# Patient Record
Sex: FEMALE | Race: ASIAN | NOT HISPANIC OR LATINO
[De-identification: names, ages, dates, MRNs, and addresses within clinical notes are randomized per-mention and may not be internally consistent; named-entity substitution may affect disease eponyms.]

---

## 2017-03-01 ENCOUNTER — OTHER (OUTPATIENT)
Age: 42
End: 2017-03-01

## 2017-03-02 ENCOUNTER — APPOINTMENT (OUTPATIENT)
Dept: ULTRASOUND IMAGING | Facility: CLINIC | Age: 42
End: 2017-03-02

## 2017-03-02 ENCOUNTER — OUTPATIENT (OUTPATIENT)
Dept: OUTPATIENT SERVICES | Facility: HOSPITAL | Age: 42
LOS: 1 days | End: 2017-03-02
Payer: SELF-PAY

## 2017-03-02 DIAGNOSIS — R10.2 PELVIC AND PERINEAL PAIN: ICD-10-CM

## 2017-03-02 PROCEDURE — 76830 TRANSVAGINAL US NON-OB: CPT

## 2017-03-02 PROCEDURE — 76856 US EXAM PELVIC COMPLETE: CPT

## 2017-03-09 ENCOUNTER — APPOINTMENT (OUTPATIENT)
Dept: ULTRASOUND IMAGING | Facility: CLINIC | Age: 42
End: 2017-03-09

## 2017-03-29 ENCOUNTER — LABORATORY RESULT (OUTPATIENT)
Age: 42
End: 2017-03-29

## 2017-03-29 ENCOUNTER — APPOINTMENT (OUTPATIENT)
Dept: OBGYN | Facility: CLINIC | Age: 42
End: 2017-03-29

## 2017-03-29 ENCOUNTER — OUTPATIENT (OUTPATIENT)
Dept: OUTPATIENT SERVICES | Facility: HOSPITAL | Age: 42
LOS: 1 days | End: 2017-03-29
Payer: SELF-PAY

## 2017-03-29 VITALS — WEIGHT: 106 LBS | DIASTOLIC BLOOD PRESSURE: 74 MMHG | SYSTOLIC BLOOD PRESSURE: 118 MMHG | BODY MASS INDEX: 20.03 KG/M2

## 2017-03-29 DIAGNOSIS — T83.32XA DISPLACEMENT OF INTRAUTERINE CONTRACEPTIVE DEVICE, INITIAL ENCOUNTER: ICD-10-CM

## 2017-03-29 DIAGNOSIS — N76.0 ACUTE VAGINITIS: ICD-10-CM

## 2017-03-29 DIAGNOSIS — R10.2 PELVIC AND PERINEAL PAIN: ICD-10-CM

## 2017-03-29 DIAGNOSIS — Z86.19 PERSONAL HISTORY OF OTHER INFECTIOUS AND PARASITIC DISEASES: ICD-10-CM

## 2017-03-29 PROCEDURE — 87389 HIV-1 AG W/HIV-1&-2 AB AG IA: CPT

## 2017-03-29 PROCEDURE — G0463: CPT

## 2017-03-29 PROCEDURE — 86592 SYPHILIS TEST NON-TREP QUAL: CPT

## 2017-03-30 LAB
HIV 1+2 AB+HIV1 P24 AG SERPL QL IA: SIGNIFICANT CHANGE UP
RPR SERPL-ACNC: SIGNIFICANT CHANGE UP

## 2017-03-31 LAB
C TRACH RRNA SPEC QL NAA+PROBE: SIGNIFICANT CHANGE UP
N GONORRHOEA RRNA SPEC QL NAA+PROBE: SIGNIFICANT CHANGE UP
SPECIMEN SOURCE: SIGNIFICANT CHANGE UP

## 2017-04-04 DIAGNOSIS — Z01.419 ENCOUNTER FOR GYNECOLOGICAL EXAMINATION (GENERAL) (ROUTINE) WITHOUT ABNORMAL FINDINGS: ICD-10-CM

## 2017-04-21 ENCOUNTER — MESSAGE (OUTPATIENT)
Age: 42
End: 2017-04-21

## 2018-03-28 ENCOUNTER — LABORATORY RESULT (OUTPATIENT)
Age: 43
End: 2018-03-28

## 2018-03-28 ENCOUNTER — APPOINTMENT (OUTPATIENT)
Dept: INTERNAL MEDICINE | Facility: CLINIC | Age: 43
End: 2018-03-28
Payer: MEDICAID

## 2018-03-28 ENCOUNTER — OUTPATIENT (OUTPATIENT)
Dept: OUTPATIENT SERVICES | Facility: HOSPITAL | Age: 43
LOS: 1 days | End: 2018-03-28
Payer: MEDICAID

## 2018-03-28 VITALS
SYSTOLIC BLOOD PRESSURE: 110 MMHG | BODY MASS INDEX: 20.2 KG/M2 | DIASTOLIC BLOOD PRESSURE: 70 MMHG | HEIGHT: 61 IN | WEIGHT: 107 LBS

## 2018-03-28 DIAGNOSIS — Z78.9 OTHER SPECIFIED HEALTH STATUS: ICD-10-CM

## 2018-03-28 DIAGNOSIS — I10 ESSENTIAL (PRIMARY) HYPERTENSION: ICD-10-CM

## 2018-03-28 DIAGNOSIS — R53.83 OTHER FATIGUE: ICD-10-CM

## 2018-03-28 DIAGNOSIS — Z83.3 FAMILY HISTORY OF DIABETES MELLITUS: ICD-10-CM

## 2018-03-28 DIAGNOSIS — Z82.49 FAMILY HISTORY OF ISCHEMIC HEART DISEASE AND OTHER DISEASES OF THE CIRCULATORY SYSTEM: ICD-10-CM

## 2018-03-28 DIAGNOSIS — Z87.42 PERSONAL HISTORY OF OTHER DISEASES OF THE FEMALE GENITAL TRACT: ICD-10-CM

## 2018-03-28 PROCEDURE — 80061 LIPID PANEL: CPT

## 2018-03-28 PROCEDURE — 80053 COMPREHEN METABOLIC PANEL: CPT

## 2018-03-28 PROCEDURE — 85027 COMPLETE CBC AUTOMATED: CPT

## 2018-03-28 PROCEDURE — G0463: CPT

## 2018-03-28 PROCEDURE — 99203 OFFICE O/P NEW LOW 30 MIN: CPT | Mod: GE

## 2018-03-28 PROCEDURE — 83036 HEMOGLOBIN GLYCOSYLATED A1C: CPT

## 2018-03-28 PROCEDURE — 84443 ASSAY THYROID STIM HORMONE: CPT

## 2018-03-29 LAB
HBA1C BLD-MCNC: 5.4 % — SIGNIFICANT CHANGE UP (ref 4–5.6)
HCT VFR BLD CALC: 42 % — SIGNIFICANT CHANGE UP (ref 34.5–45)
HGB BLD-MCNC: 13.5 G/DL — SIGNIFICANT CHANGE UP (ref 11.5–15.5)
MCHC RBC-ENTMCNC: 28.1 PG — SIGNIFICANT CHANGE UP (ref 27–34)
MCHC RBC-ENTMCNC: 32.1 GM/DL — SIGNIFICANT CHANGE UP (ref 32–36)
MCV RBC AUTO: 87.3 FL — SIGNIFICANT CHANGE UP (ref 80–100)
NRBC # BLD: 0 /100 WBCS — SIGNIFICANT CHANGE UP (ref 0–0)
PLATELET # BLD AUTO: 206 K/UL — SIGNIFICANT CHANGE UP (ref 150–400)
RBC # BLD: 4.81 M/UL — SIGNIFICANT CHANGE UP (ref 3.8–5.2)
RBC # FLD: 14.1 % — SIGNIFICANT CHANGE UP (ref 10.3–14.5)
WBC # BLD: 5.51 K/UL — SIGNIFICANT CHANGE UP (ref 3.8–10.5)
WBC # FLD AUTO: 5.51 K/UL — SIGNIFICANT CHANGE UP (ref 3.8–10.5)

## 2018-03-30 ENCOUNTER — FORM ENCOUNTER (OUTPATIENT)
Age: 43
End: 2018-03-30

## 2018-03-30 LAB
ALBUMIN SERPL ELPH-MCNC: 4.3 G/DL — SIGNIFICANT CHANGE UP (ref 3.3–5)
ALP SERPL-CCNC: 68 U/L — SIGNIFICANT CHANGE UP (ref 40–120)
ALT FLD-CCNC: 11 U/L — SIGNIFICANT CHANGE UP (ref 10–45)
ANION GAP SERPL CALC-SCNC: 12 MMOL/L — SIGNIFICANT CHANGE UP (ref 5–17)
AST SERPL-CCNC: 17 U/L — SIGNIFICANT CHANGE UP (ref 10–40)
BILIRUB SERPL-MCNC: 0.3 MG/DL — SIGNIFICANT CHANGE UP (ref 0.2–1.2)
BUN SERPL-MCNC: 11 MG/DL — SIGNIFICANT CHANGE UP (ref 7–23)
CALCIUM SERPL-MCNC: 10 MG/DL — SIGNIFICANT CHANGE UP (ref 8.4–10.5)
CHLORIDE SERPL-SCNC: 101 MMOL/L — SIGNIFICANT CHANGE UP (ref 96–108)
CHOLEST SERPL-MCNC: 188 MG/DL — SIGNIFICANT CHANGE UP (ref 10–199)
CO2 SERPL-SCNC: 28 MMOL/L — SIGNIFICANT CHANGE UP (ref 22–31)
CREAT SERPL-MCNC: 0.83 MG/DL — SIGNIFICANT CHANGE UP (ref 0.5–1.3)
GLUCOSE SERPL-MCNC: 83 MG/DL — SIGNIFICANT CHANGE UP (ref 70–99)
HDLC SERPL-MCNC: 59 MG/DL — SIGNIFICANT CHANGE UP (ref 40–125)
LIPID PNL WITH DIRECT LDL SERPL: 111 MG/DL — SIGNIFICANT CHANGE UP
POTASSIUM SERPL-MCNC: 4.7 MMOL/L — SIGNIFICANT CHANGE UP (ref 3.5–5.3)
POTASSIUM SERPL-SCNC: 4.7 MMOL/L — SIGNIFICANT CHANGE UP (ref 3.5–5.3)
PROT SERPL-MCNC: 7.9 G/DL — SIGNIFICANT CHANGE UP (ref 6–8.3)
SODIUM SERPL-SCNC: 141 MMOL/L — SIGNIFICANT CHANGE UP (ref 135–145)
T4 FREE+ TSH PNL SERPL: 1.01 UIU/ML — SIGNIFICANT CHANGE UP (ref 0.27–4.2)
TOTAL CHOLESTEROL/HDL RATIO MEASUREMENT: 3.2 RATIO — LOW (ref 3.3–7.1)
TRIGL SERPL-MCNC: 90 MG/DL — SIGNIFICANT CHANGE UP (ref 10–149)

## 2018-03-31 ENCOUNTER — OUTPATIENT (OUTPATIENT)
Dept: OUTPATIENT SERVICES | Facility: HOSPITAL | Age: 43
LOS: 1 days | End: 2018-03-31
Payer: MEDICAID

## 2018-03-31 ENCOUNTER — APPOINTMENT (OUTPATIENT)
Dept: MAMMOGRAPHY | Facility: IMAGING CENTER | Age: 43
End: 2018-03-31
Payer: MEDICAID

## 2018-03-31 DIAGNOSIS — Z00.00 ENCOUNTER FOR GENERAL ADULT MEDICAL EXAMINATION WITHOUT ABNORMAL FINDINGS: ICD-10-CM

## 2018-03-31 PROCEDURE — 77067 SCR MAMMO BI INCL CAD: CPT | Mod: 26

## 2018-03-31 PROCEDURE — 77063 BREAST TOMOSYNTHESIS BI: CPT | Mod: 26

## 2018-03-31 PROCEDURE — 77063 BREAST TOMOSYNTHESIS BI: CPT

## 2018-03-31 PROCEDURE — 77067 SCR MAMMO BI INCL CAD: CPT

## 2018-04-04 DIAGNOSIS — R53.83 OTHER FATIGUE: ICD-10-CM

## 2018-04-11 ENCOUNTER — APPOINTMENT (OUTPATIENT)
Dept: OBGYN | Facility: CLINIC | Age: 43
End: 2018-04-11

## 2018-04-23 ENCOUNTER — OUTPATIENT (OUTPATIENT)
Dept: OUTPATIENT SERVICES | Facility: HOSPITAL | Age: 43
LOS: 1 days | End: 2018-04-23
Payer: MEDICAID

## 2018-04-23 ENCOUNTER — APPOINTMENT (OUTPATIENT)
Dept: INTERNAL MEDICINE | Facility: CLINIC | Age: 43
End: 2018-04-23
Payer: MEDICAID

## 2018-04-23 VITALS — HEART RATE: 77 BPM | DIASTOLIC BLOOD PRESSURE: 60 MMHG | OXYGEN SATURATION: 99 % | SYSTOLIC BLOOD PRESSURE: 101 MMHG

## 2018-04-23 DIAGNOSIS — I10 ESSENTIAL (PRIMARY) HYPERTENSION: ICD-10-CM

## 2018-04-23 PROCEDURE — 99214 OFFICE O/P EST MOD 30 MIN: CPT | Mod: GC

## 2018-04-23 PROCEDURE — G0463: CPT

## 2018-04-25 DIAGNOSIS — T88.7XXA UNSPECIFIED ADVERSE EFFECT OF DRUG OR MEDICAMENT, INITIAL ENCOUNTER: ICD-10-CM

## 2018-05-24 ENCOUNTER — APPOINTMENT (OUTPATIENT)
Dept: OBGYN | Facility: CLINIC | Age: 43
End: 2018-05-24

## 2018-12-07 ENCOUNTER — CHART COPY (OUTPATIENT)
Age: 43
End: 2018-12-07

## 2019-02-25 ENCOUNTER — APPOINTMENT (OUTPATIENT)
Dept: INTERNAL MEDICINE | Facility: CLINIC | Age: 44
End: 2019-02-25
Payer: MEDICAID

## 2019-02-25 ENCOUNTER — OUTPATIENT (OUTPATIENT)
Dept: OUTPATIENT SERVICES | Facility: HOSPITAL | Age: 44
LOS: 1 days | End: 2019-02-25
Payer: MEDICAID

## 2019-02-25 VITALS
OXYGEN SATURATION: 97 % | SYSTOLIC BLOOD PRESSURE: 110 MMHG | HEART RATE: 73 BPM | WEIGHT: 117 LBS | BODY MASS INDEX: 22.09 KG/M2 | DIASTOLIC BLOOD PRESSURE: 70 MMHG | TEMPERATURE: 98.4 F | HEIGHT: 61 IN

## 2019-02-25 DIAGNOSIS — I10 ESSENTIAL (PRIMARY) HYPERTENSION: ICD-10-CM

## 2019-02-25 PROCEDURE — 99213 OFFICE O/P EST LOW 20 MIN: CPT | Mod: GE

## 2019-02-25 PROCEDURE — G0463: CPT

## 2019-02-25 NOTE — PHYSICAL EXAM
[de-identified] : midl erythema of posterior pharynx, no exudates. edematous, erythematous nasal turbinates b/l with clear discharge.

## 2019-02-25 NOTE — REVIEW OF SYSTEMS
[Fever] : no fever [Earache] : no earache [Sore Throat] : no sore throat [Chest Pain] : no chest pain [Palpitations] : no palpitations [Shortness Of Breath] : no shortness of breath [Wheezing] : no wheezing [Abdominal Pain] : no abdominal pain [Vomiting] : no vomiting [Joint Pain] : no joint pain [Muscle Pain] : no muscle pain [Itching] : no itching [Fainting] : no fainting

## 2019-02-25 NOTE — ASSESSMENT
[FreeTextEntry1] : 43F with no significant PMH here for evaluation of cold-like symptoms x 6 days, likely 2/2 viral URI.\par \par # Viral URI:\par - advised conservative, symptomatic treatment for now\par - tylenol PRN headache, cepacol drops PRN sore throat, tessalon pearles PRN cough, sudafed PRN congestion\par - advised ample hydration\par - advised patient to call office if symptoms worsen, she develops fever, for re-evaluation\par - set expectation that symptoms may linger/persist for 1-2 weeks\par \par RTC 2 months for CPE

## 2019-02-25 NOTE — HISTORY OF PRESENT ILLNESS
[FreeTextEntry8] : 43F with no significant PMH here for an acute visit for cold-like symptoms. Patient states since Wednesday (6 days ago) she has been developing worsening cough, congestion, headache and nasal congestion. She also has been fatigued and having mild chills. Denies any recent travel or sick contacts. Did not get the flu shot this year. Symptoms moderately improved with robitussin and cough drops. Denies any nausea, vomiting, diarrhea, no recorded fever. No dyspnea or chest pain.

## 2019-02-27 DIAGNOSIS — J06.9 ACUTE UPPER RESPIRATORY INFECTION, UNSPECIFIED: ICD-10-CM

## 2019-03-19 ENCOUNTER — OUTPATIENT (OUTPATIENT)
Dept: OUTPATIENT SERVICES | Facility: HOSPITAL | Age: 44
LOS: 1 days | End: 2019-03-19
Payer: MEDICAID

## 2019-03-19 ENCOUNTER — LABORATORY RESULT (OUTPATIENT)
Age: 44
End: 2019-03-19

## 2019-03-19 ENCOUNTER — APPOINTMENT (OUTPATIENT)
Dept: OBGYN | Facility: CLINIC | Age: 44
End: 2019-03-19
Payer: MEDICAID

## 2019-03-19 VITALS — DIASTOLIC BLOOD PRESSURE: 70 MMHG | WEIGHT: 119 LBS | BODY MASS INDEX: 22.48 KG/M2 | SYSTOLIC BLOOD PRESSURE: 110 MMHG

## 2019-03-19 DIAGNOSIS — Z01.419 ENCOUNTER FOR GYNECOLOGICAL EXAMINATION (GENERAL) (ROUTINE) W/OUT ABNORMAL FINDINGS: ICD-10-CM

## 2019-03-19 DIAGNOSIS — N76.0 ACUTE VAGINITIS: ICD-10-CM

## 2019-03-19 PROCEDURE — 87591 N.GONORRHOEAE DNA AMP PROB: CPT

## 2019-03-19 PROCEDURE — 87491 CHLMYD TRACH DNA AMP PROBE: CPT

## 2019-03-19 PROCEDURE — G0463: CPT

## 2019-03-19 PROCEDURE — 87624 HPV HI-RISK TYP POOLED RSLT: CPT

## 2019-03-19 PROCEDURE — 99396 PREV VISIT EST AGE 40-64: CPT | Mod: NC

## 2019-03-19 NOTE — HISTORY OF PRESENT ILLNESS
[Good] : being in good health [Healthy Diet] : a healthy diet [Regular Exercise] : regular exercise [Last Mammogram ___] : Last Mammogram was [unfilled] [Reproductive Age] : is of reproductive age [Definite:  ___ (Date)] : the last menstrual period was [unfilled] [Normal Amount/Duration] : was of a normal amount and duration [Spotting Between  Menses] : no spotting between menses [Regular Cycle Intervals] : periods have been regular [Frequency: Q ___ days] : menstrual periods occur approximately every [unfilled] days [Sexually Active] : is sexually active [Monogamous] : is monogamous [Contraception] : uses contraception [Male ___] : [unfilled] male [de-identified] : Essure

## 2019-03-19 NOTE — PHYSICAL EXAM
[Awake] : awake [Alert] : alert [LAD] : no lymphadenopathy [Acute Distress] : no acute distress [Thyroid Nodule] : no thyroid nodule [Goiter] : no goiter [Mass] : no breast mass [Axillary LAD] : no axillary lymphadenopathy [Nipple Discharge] : no nipple discharge [Soft] : soft [Tender] : non tender [Oriented x3] : oriented to person, place, and time [Normal] : uterus [No Bleeding] : there was no active vaginal bleeding [Uterine Adnexae] : were not tender and not enlarged

## 2019-03-20 LAB
C TRACH RRNA SPEC QL NAA+PROBE: SIGNIFICANT CHANGE UP
HPV HIGH+LOW RISK DNA PNL CVX: SIGNIFICANT CHANGE UP
N GONORRHOEA RRNA SPEC QL NAA+PROBE: SIGNIFICANT CHANGE UP
SPECIMEN SOURCE: SIGNIFICANT CHANGE UP

## 2019-03-21 DIAGNOSIS — Z01.419 ENCOUNTER FOR GYNECOLOGICAL EXAMINATION (GENERAL) (ROUTINE) WITHOUT ABNORMAL FINDINGS: ICD-10-CM

## 2019-03-21 DIAGNOSIS — N92.0 EXCESSIVE AND FREQUENT MENSTRUATION WITH REGULAR CYCLE: ICD-10-CM

## 2019-03-22 LAB — CYTOLOGY SPEC DOC CYTO: SIGNIFICANT CHANGE UP

## 2019-04-01 ENCOUNTER — OUTPATIENT (OUTPATIENT)
Dept: OUTPATIENT SERVICES | Facility: HOSPITAL | Age: 44
LOS: 1 days | End: 2019-04-01
Payer: MEDICAID

## 2019-04-01 ENCOUNTER — FORM ENCOUNTER (OUTPATIENT)
Age: 44
End: 2019-04-01

## 2019-04-01 ENCOUNTER — APPOINTMENT (OUTPATIENT)
Dept: INTERNAL MEDICINE | Facility: CLINIC | Age: 44
End: 2019-04-01
Payer: MEDICAID

## 2019-04-01 VITALS
WEIGHT: 120 LBS | HEIGHT: 61 IN | BODY MASS INDEX: 22.66 KG/M2 | SYSTOLIC BLOOD PRESSURE: 130 MMHG | DIASTOLIC BLOOD PRESSURE: 80 MMHG

## 2019-04-01 DIAGNOSIS — I10 ESSENTIAL (PRIMARY) HYPERTENSION: ICD-10-CM

## 2019-04-01 DIAGNOSIS — G44.209 TENSION-TYPE HEADACHE, UNSPECIFIED, NOT INTRACTABLE: ICD-10-CM

## 2019-04-01 PROCEDURE — 99213 OFFICE O/P EST LOW 20 MIN: CPT | Mod: GE

## 2019-04-01 PROCEDURE — G0463: CPT

## 2019-04-02 ENCOUNTER — APPOINTMENT (OUTPATIENT)
Dept: ULTRASOUND IMAGING | Facility: IMAGING CENTER | Age: 44
End: 2019-04-02
Payer: MEDICAID

## 2019-04-02 ENCOUNTER — APPOINTMENT (OUTPATIENT)
Dept: MAMMOGRAPHY | Facility: IMAGING CENTER | Age: 44
End: 2019-04-02
Payer: MEDICAID

## 2019-04-02 ENCOUNTER — OUTPATIENT (OUTPATIENT)
Dept: OUTPATIENT SERVICES | Facility: HOSPITAL | Age: 44
LOS: 1 days | End: 2019-04-02
Payer: MEDICAID

## 2019-04-02 DIAGNOSIS — Z00.00 ENCOUNTER FOR GENERAL ADULT MEDICAL EXAMINATION WITHOUT ABNORMAL FINDINGS: ICD-10-CM

## 2019-04-02 DIAGNOSIS — N92.0 EXCESSIVE AND FREQUENT MENSTRUATION WITH REGULAR CYCLE: ICD-10-CM

## 2019-04-02 PROCEDURE — 77063 BREAST TOMOSYNTHESIS BI: CPT | Mod: 26

## 2019-04-02 PROCEDURE — 77067 SCR MAMMO BI INCL CAD: CPT

## 2019-04-02 PROCEDURE — 77067 SCR MAMMO BI INCL CAD: CPT | Mod: 26

## 2019-04-02 PROCEDURE — 76856 US EXAM PELVIC COMPLETE: CPT

## 2019-04-02 PROCEDURE — 76830 TRANSVAGINAL US NON-OB: CPT

## 2019-04-02 PROCEDURE — 76830 TRANSVAGINAL US NON-OB: CPT | Mod: 26

## 2019-04-02 PROCEDURE — 76856 US EXAM PELVIC COMPLETE: CPT | Mod: 26

## 2019-04-02 PROCEDURE — 77063 BREAST TOMOSYNTHESIS BI: CPT

## 2019-04-02 NOTE — PHYSICAL EXAM
[No Acute Distress] : no acute distress [Well Nourished] : well nourished [Normal Voice/Communication] : normal voice/communication [Ill-Appearing] : ill-appearing [Normal Sclera/Conjunctiva] : normal sclera/conjunctiva [PERRL] : pupils equal round and reactive to light [EOMI] : extraocular movements intact [Normal Outer Ear/Nose] : the outer ears and nose were normal in appearance [Normal Oropharynx] : the oropharynx was normal [Normal TMs] : both tympanic membranes were normal [No JVD] : no jugular venous distention [Supple] : supple [No Lymphadenopathy] : no lymphadenopathy [No Respiratory Distress] : no respiratory distress  [Clear to Auscultation] : lungs were clear to auscultation bilaterally [No Accessory Muscle Use] : no accessory muscle use [Normal Rate] : normal rate  [Regular Rhythm] : with a regular rhythm [Normal S1, S2] : normal S1 and S2 [No Carotid Bruits] : no carotid bruits [No Abdominal Bruit] : a ~M bruit was not heard ~T in the abdomen [No Edema] : there was no peripheral edema [No Extremity Clubbing/Cyanosis] : no extremity clubbing/cyanosis [Soft] : abdomen soft [Non Tender] : non-tender [No HSM] : no HSM [Normal Bowel Sounds] : normal bowel sounds [No Rash] : no rash [No Skin Lesions] : no skin lesions [Normal Gait] : normal gait [Coordination Grossly Intact] : coordination grossly intact [No Focal Deficits] : no focal deficits [Deep Tendon Reflexes (DTR)] : deep tendon reflexes were 2+ and symmetric

## 2019-04-11 ENCOUNTER — OUTPATIENT (OUTPATIENT)
Dept: OUTPATIENT SERVICES | Facility: HOSPITAL | Age: 44
LOS: 1 days | End: 2019-04-11
Payer: MEDICAID

## 2019-04-11 ENCOUNTER — APPOINTMENT (OUTPATIENT)
Dept: OBGYN | Facility: CLINIC | Age: 44
End: 2019-04-11
Payer: MEDICAID

## 2019-04-11 VITALS — DIASTOLIC BLOOD PRESSURE: 80 MMHG | SYSTOLIC BLOOD PRESSURE: 120 MMHG | BODY MASS INDEX: 22.48 KG/M2 | WEIGHT: 119 LBS

## 2019-04-11 DIAGNOSIS — N92.0 EXCESSIVE AND FREQUENT MENSTRUATION WITH REGULAR CYCLE: ICD-10-CM

## 2019-04-11 DIAGNOSIS — N76.0 ACUTE VAGINITIS: ICD-10-CM

## 2019-04-11 PROCEDURE — 99213 OFFICE O/P EST LOW 20 MIN: CPT | Mod: NC

## 2019-04-11 PROCEDURE — G0463: CPT

## 2019-04-12 NOTE — CHIEF COMPLAINT
[Follow Up] : follow up GYN visit [FreeTextEntry1] : pt here for followup after visit in March 2019 for menstrual abnormalities

## 2019-04-14 NOTE — ASSESSMENT
[FreeTextEntry1] : 43F no PMH presents for headache for 2 days likely 2/2 tension headache.\par \par # tension headache - recommended NSAIDS, head pack on neck, w/ neck stretches to help w/ neck pain.  no alarm features, no imaging needed at this time.

## 2019-04-14 NOTE — HISTORY OF PRESENT ILLNESS
[FreeTextEntry8] : 43F no PMH presents for acute visit for headache.  Has history of occasional headaches, however this one worse and lasting longer so came in.  Headache for 2 days, bifrontal, throbbing, a/w neck muscle tightness and pain.  No fevers, chills, weight loss, weakness.  No history of cancer.  Has been taking tylenol without relief, did not take NSAIDs.  Also expresses blurry vision a/w headache, feels that it is worse with the headache.  No photophobia/phonophobia/nausea/vomiting.

## 2019-04-14 NOTE — REVIEW OF SYSTEMS
[Muscle Pain] : muscle pain [Headache] : headache [Fever] : no fever [Chills] : no chills [Night Sweats] : no night sweats [Recent Change In Weight] : ~T no recent weight change [Discharge] : no discharge [Pain] : no pain [Vision Problems] : no vision problems [Itching] : no itching [Earache] : no earache [Hearing Loss] : no hearing loss [Sore Throat] : no sore throat [Nasal Discharge] : no nasal discharge [Chest Pain] : no chest pain [Palpitations] : no palpitations [Paroysmal Nocturnal Dyspnea] : no paroysmal nocturnal dyspnea [Orthopnea] : no orthopnea [Shortness Of Breath] : no shortness of breath [Abdominal Pain] : no abdominal pain [Wheezing] : no wheezing [Vomiting] : no vomiting [Constipation] : no constipation [Nausea] : no nausea [Heartburn] : no heartburn [Melena] : no melena [Nocturia] : no nocturia [Incontinence] : no incontinence [Dysuria] : no dysuria [Vaginal Discharge] : no vaginal discharge [Frequency] : no frequency [Hematuria] : no hematuria [Joint Pain] : no joint pain [Joint Stiffness] : no joint stiffness [Back Pain] : no back pain [Joint Swelling] : no joint swelling [Nail Changes] : no nail changes [Mole Changes] : no mole changes [Skin Rash] : no skin rash [Dizziness] : no dizziness [Fainting] : no fainting [Unsteady Walking] : no ataxia [Memory Loss] : no memory loss [Suicidal] : not suicidal [Insomnia] : no insomnia [Anxiety] : no anxiety

## 2019-04-15 DIAGNOSIS — N92.0 EXCESSIVE AND FREQUENT MENSTRUATION WITH REGULAR CYCLE: ICD-10-CM

## 2019-04-28 ENCOUNTER — EMERGENCY (EMERGENCY)
Facility: HOSPITAL | Age: 44
LOS: 1 days | Discharge: HOME | End: 2019-04-28
Attending: EMERGENCY MEDICINE
Payer: MEDICAID

## 2019-04-28 VITALS
HEIGHT: 59 IN | SYSTOLIC BLOOD PRESSURE: 147 MMHG | RESPIRATION RATE: 18 BRPM | TEMPERATURE: 98 F | DIASTOLIC BLOOD PRESSURE: 84 MMHG | HEART RATE: 72 BPM | WEIGHT: 117.95 LBS | OXYGEN SATURATION: 99 %

## 2019-04-28 PROCEDURE — 70450 CT HEAD/BRAIN W/O DYE: CPT

## 2019-04-28 PROCEDURE — 99284 EMERGENCY DEPT VISIT MOD MDM: CPT

## 2019-04-28 PROCEDURE — 99284 EMERGENCY DEPT VISIT MOD MDM: CPT | Mod: 25

## 2019-04-28 PROCEDURE — 70450 CT HEAD/BRAIN W/O DYE: CPT | Mod: 26

## 2019-04-28 PROCEDURE — 96374 THER/PROPH/DIAG INJ IV PUSH: CPT

## 2019-04-28 RX ORDER — SODIUM CHLORIDE 9 MG/ML
1000 INJECTION INTRAMUSCULAR; INTRAVENOUS; SUBCUTANEOUS ONCE
Qty: 0 | Refills: 0 | Status: COMPLETED | OUTPATIENT
Start: 2019-04-28 | End: 2019-04-28

## 2019-04-28 RX ORDER — ACETAMINOPHEN 500 MG
975 TABLET ORAL ONCE
Qty: 0 | Refills: 0 | Status: COMPLETED | OUTPATIENT
Start: 2019-04-28 | End: 2019-04-28

## 2019-04-28 RX ORDER — IBUPROFEN 200 MG
600 TABLET ORAL ONCE
Qty: 0 | Refills: 0 | Status: COMPLETED | OUTPATIENT
Start: 2019-04-28 | End: 2019-04-28

## 2019-04-28 RX ORDER — METOCLOPRAMIDE HCL 10 MG
10 TABLET ORAL ONCE
Qty: 0 | Refills: 0 | Status: COMPLETED | OUTPATIENT
Start: 2019-04-28 | End: 2019-04-28

## 2019-04-28 RX ADMIN — Medication 10 MILLIGRAM(S): at 17:35

## 2019-04-28 RX ADMIN — Medication 600 MILLIGRAM(S): at 17:35

## 2019-04-28 RX ADMIN — SODIUM CHLORIDE 1000 MILLILITER(S): 9 INJECTION INTRAMUSCULAR; INTRAVENOUS; SUBCUTANEOUS at 17:38

## 2019-04-28 RX ADMIN — Medication 975 MILLIGRAM(S): at 17:35

## 2019-04-28 NOTE — ED PROVIDER NOTE - CLINICAL SUMMARY MEDICAL DECISION MAKING FREE TEXT BOX
42 yo F presenting with 1 month headaches with associated nausea, eye pain and photophobia. Waking up with headaches. No focal findings on exam, PERRL, IOP normal. Will obtain Ct-H, tx with headache cocktail and reassess. Likely dc with neurology fu. 42 yo F presenting with 1 month headaches with associated nausea, eye pain and photophobia. Waking up with headaches. No focal findings on exam, PERRL, IOP normal. Will obtain Ct-H, tx with headache cocktail and reassess. Likely dc with neurology fu.  Mario Alberto: 43 year old female with 1 month HA with nausea. intermittent HA, goes away at times. recently prescribed reading glassess. mary get ct head, pain control, reassess. not worse HA of her life, has had similar headaches in the past, this has become more frequent.

## 2019-04-28 NOTE — ED PROVIDER NOTE - NS ED ROS FT
CONST: no fevers, no chills, no trauma  EYES: + pain, + visual disturbances  ENT: no sore throat, no epistaxis, no rhinorrhea, no hearing changes  CV: no chest pain, no palpitations, no orthopnea, no extremity pain or swelling  RESP: no shortness of breath, no cough, no sputum, no pleurisy, no wheezing  ABD: no abdominal pain, no nausea, no vomiting, no diarrhea, no black or bloody stool  : no dysuria, no hematuria, no frequency, no urgency  MSK: no back pain, no neck pain, no extremity pain  NEURO: + headache, no sensory disturbances, no focal weakness, no dizziness  HEME: no easy bleeding or bruising  SKIN: no diaphoresis, no rash

## 2019-04-28 NOTE — ED PROVIDER NOTE - NSFOLLOWUPCLINICS_GEN_ALL_ED_FT
[facility]  Neurology  [address] [place] [address] [address] [place] [address]  Phone: [phone]  Fax:   Follow Up Time:

## 2019-04-28 NOTE — ED PROVIDER NOTE - PHYSICAL EXAMINATION
VITALS: reviewed  GEN: NAD, A & O x 4  HEAD/EYES: NCAT, PERRL, EOMI, anicteric sclerae, no conjunctival pallor. b/l vision 20/15, L 20/20, R 20/20. IOP 9 b/l  ENT: mucus membranes moist, oropharynx WNL, trachea midline, no JVD  RESP: lungs CTA with equal breath sounds bilaterally, chest wall nontender and atraumatic  CV: heart with reg rhythm S1, S2, no murmur; distal pulses intact and symmetric bilaterally  ABDOMEN: normoactive bowel sounds, soft, nondistended, nontender, no palpable masses  : no CVAT  MSK: extremities atraumatic and nontender, no edema, no asymmetry. the back is without midline or lateral tenderness, there is no spinal deformity or stepoff and the back is ranged painlessly. the neck has no midline tenderness, deformity, or stepoff, and is ranged painlessly.  SKIN: warm, dry, no rash, no bruising, no cyanosis. color appropriate for ethnicity  NEURO: alert, mentating appropriately, no facial asymmetry. gross sensation, motor, coordination are intact  PSYCH: Affect appropriate

## 2019-04-28 NOTE — ED PROVIDER NOTE - NSFOLLOWUPINSTRUCTIONS_ED_ALL_ED_FT
You were seen and evaluated in the emergency department for a headache. Your exam, workup and imaging were reassuring. You were given treatment which improved your symptoms. We have recommended a course of treatment to help treat headache symptoms Please follow up with your regular provider in the next 2-3 days. Please read the attached information sheets which provide useful information pertinent to your condition.    It is important to understand that no medical workup can completely exclude all concerning conditions. Therefore, we ask that you please return for worsening or concerning new symptoms including but not limited to changes to your vision, sensory changes such as numbness, motor weakness (loss of strength or inability to properly use parts of your body), dizziness or loss of coordination, loss of consciousness, significant worsening of the pain, or other worsening or concerning new symptoms.    Please follow up with your regular providers within the next 2-3 days.    Take Motrin 600 mg every 8 hours as needed for moderate pain -- take with food.  Take Tylenol 650mg (Two 325 mg pills) every 4-6 hours as needed for pain.    Call to schedule a follow up appointment with neurology, as soon as possible.

## 2019-04-28 NOTE — ED ADULT TRIAGE NOTE - CHIEF COMPLAINT QUOTE
HA x 1 month, +photophobia/nausea, no vomiting. pt was seen by clinic who told her it was probably stress. no hx of migraines

## 2019-04-28 NOTE — ED PROVIDER NOTE - OBJECTIVE STATEMENT
44 yo otherwise healthy F presenting with one month of headaches. HA is L sided, described as sharp/stabbing pain, radiating to eyes/neck. Reports constant headache, minimal improvement with Tylenol. Reports going to bed with headache and waking up with headache. Endorses photo/phonophobia, with associated nausea. No fevers/chills. Headaches are different from headaches in past--gets occasional headaches "once in a while," but never like this.  Denies association with food intake. Seen by ophthalmology and prescribed reading glasses. No family history of migraines. Denies focal weakness, numbness/tingling. 42 yo otherwise healthy F presenting with one month of headaches. HA is L sided, described as sharp/stabbing pain, radiating to eyes/neck. Reports constant headache, minimal improvement with Tylenol. Reports going to bed with headache and waking up with headache. Endorses photo/phonophobia, with associated nausea. No fevers/chills. Headaches are different from headaches in past--gets occasional headaches "once in a while," but never like this.  Denies association with menstrual periods. Headaches are non-positional. Seen by ophthalmology and prescribed reading glasses. No family history of migraines. Denies focal weakness, numbness/tingling.

## 2019-04-28 NOTE — ED ADULT NURSE NOTE - NSIMPLEMENTINTERV_GEN_ALL_ED
Implemented All Universal Safety Interventions:  [place] to call system. Call bell, personal items and telephone within reach. Instruct patient to call for assistance. Room bathroom lighting operational. Non-slip footwear when patient is off stretcher. Physically safe environment: no spills, clutter or unnecessary equipment. Stretcher in lowest position, wheels locked, appropriate side rails in place.

## 2019-04-28 NOTE — ED PROVIDER NOTE - PROGRESS NOTE DETAILS
Teena PGY2: pt reports improvement of headache 4/10 after Tylenol, tolerable and feels okay to go home. Discussed CT findings with patient and need for neurology fu for MRI. Discussed return precautions.

## 2019-04-29 ENCOUNTER — APPOINTMENT (OUTPATIENT)
Dept: INTERNAL MEDICINE | Facility: CLINIC | Age: 44
End: 2019-04-29
Payer: MEDICAID

## 2019-04-29 ENCOUNTER — OUTPATIENT (OUTPATIENT)
Dept: OUTPATIENT SERVICES | Facility: HOSPITAL | Age: 44
LOS: 1 days | End: 2019-04-29
Payer: MEDICAID

## 2019-04-29 VITALS
BODY MASS INDEX: 22.28 KG/M2 | OXYGEN SATURATION: 99 % | DIASTOLIC BLOOD PRESSURE: 60 MMHG | HEIGHT: 61 IN | SYSTOLIC BLOOD PRESSURE: 102 MMHG | HEART RATE: 67 BPM | WEIGHT: 118 LBS

## 2019-04-29 DIAGNOSIS — R51 HEADACHE: ICD-10-CM

## 2019-04-29 DIAGNOSIS — I10 ESSENTIAL (PRIMARY) HYPERTENSION: ICD-10-CM

## 2019-04-29 PROCEDURE — G0463: CPT

## 2019-04-29 PROCEDURE — 99213 OFFICE O/P EST LOW 20 MIN: CPT | Mod: GE

## 2019-04-29 NOTE — PHYSICAL EXAM
[No Focal Deficits] : no focal deficits [Deep Tendon Reflexes (DTR)] : deep tendon reflexes were 2+ and symmetric [No Acute Distress] : no acute distress [Normal Sclera/Conjunctiva] : normal sclera/conjunctiva [PERRL] : pupils equal round and reactive to light [EOMI] : extraocular movements intact [Normal Gait] : normal gait

## 2019-05-07 ENCOUNTER — APPOINTMENT (OUTPATIENT)
Dept: NEUROLOGY | Facility: CLINIC | Age: 44
End: 2019-05-07
Payer: MEDICAID

## 2019-05-07 VITALS
DIASTOLIC BLOOD PRESSURE: 78 MMHG | HEART RATE: 64 BPM | SYSTOLIC BLOOD PRESSURE: 133 MMHG | HEIGHT: 61 IN | BODY MASS INDEX: 22.28 KG/M2 | WEIGHT: 118 LBS

## 2019-05-07 DIAGNOSIS — R11.0 NAUSEA: ICD-10-CM

## 2019-05-07 DIAGNOSIS — Z83.438 FAMILY HISTORY OF OTHER DISORDER OF LIPOPROTEIN METABOLISM AND OTHER LIPIDEMIA: ICD-10-CM

## 2019-05-07 PROCEDURE — 99205 OFFICE O/P NEW HI 60 MIN: CPT

## 2019-05-07 RX ORDER — BENZONATATE 100 MG/1
100 CAPSULE ORAL 3 TIMES DAILY
Qty: 42 | Refills: 1 | Status: DISCONTINUED | COMMUNITY
Start: 2019-02-25 | End: 2019-05-07

## 2019-05-07 RX ORDER — DM/PSEUDOEPHED/ACETAMINOPHEN 15-30-500
240 TABLET ORAL DAILY
Qty: 1 | Refills: 0 | Status: DISCONTINUED | COMMUNITY
Start: 2019-02-25 | End: 2019-05-07

## 2019-05-07 RX ORDER — BENZOCAINE/MENTH/CETYLPYRD CL 15 MG-2 MG
10-2.1 LOZENGE MUCOUS MEMBRANE
Qty: 1 | Refills: 1 | Status: DISCONTINUED | COMMUNITY
Start: 2019-02-25 | End: 2019-05-07

## 2019-05-07 RX ORDER — FLUTICASONE PROPIONATE 50 UG/1
50 SPRAY, METERED NASAL DAILY
Qty: 1 | Refills: 2 | Status: DISCONTINUED | COMMUNITY
Start: 2019-02-25 | End: 2019-05-07

## 2019-05-08 ENCOUNTER — MOBILE ON CALL (OUTPATIENT)
Age: 44
End: 2019-05-08

## 2019-05-08 NOTE — HISTORY OF PRESENT ILLNESS
[FreeTextEntry1] : This is a 43-year-old right-handed woman who has had new dull headaches over the past month, radiating to the neck as a burning sensation, relieved by over-the-counter acetaminophen. On April 28, 2019, the headache became very severe, feeling like "squeezing" at both sides of the head, associated with nausea and and "weak" feeling in the eyes. She presented to the [facility] ED and had a CT Head that raised concern for low-lying cerebellar tonsils. She was treated with some pain medications and was discharged, although the same headache with nausea and "weak" eyes occurred last week. Even when she does not have a headache, she feels weak in the legs, especially when walking up and down stairs.

## 2019-05-08 NOTE — DATA REVIEWED
[FreeTextEntry1] : CT Head (4/29/19): Low-lying cerebellar tonsils, extending to 4-5 mm below level of foramen magnum

## 2019-05-08 NOTE — ASSESSMENT
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 cervicogenic headache, with possible vascular malformation given radiation of pain to b/l temporal regions.

## 2019-05-08 NOTE — PHYSICAL EXAM
[General Appearance - Alert] : alert [General Appearance - Well Nourished] : well nourished [Oriented To Time, Place, And Person] : oriented to person, place, and time [General Appearance - Well Developed] : well developed [Impaired Insight] : insight and judgment were intact [Affect] : the affect was normal [Place] : oriented to place [Person] : oriented to person [Time] : oriented to time [Visual Intact] : visual attention was ~T not ~L decreased [Concentration Intact] : normal concentrating ability [Repeating Phrases] : no difficulty repeating a phrase [Naming Objects] : no difficulty naming common objects [Comprehension] : comprehension intact [Fluency] : fluency intact [Cranial Nerves Optic (II)] : visual acuity intact bilaterally,  visual fields full to confrontation, pupils equal round and reactive to light [Cranial Nerves Facial (VII)] : face symmetrical [Cranial Nerves Trigeminal (V)] : facial sensation intact symmetrically [Cranial Nerves Oculomotor (III)] : extraocular motion intact [Cranial Nerves Glossopharyngeal (IX)] : tongue and palate midline [Cranial Nerves Vestibulocochlear (VIII)] : hearing was intact bilaterally [Cranial Nerves Hypoglossal (XII)] : there was no tongue deviation with protrusion [Cranial Nerves Accessory (XI - Cranial And Spinal)] : head turning and shoulder shrug symmetric [Motor Tone] : muscle tone was normal in all four extremities [Motor Strength] : muscle strength was normal in all four extremities [No Muscle Atrophy] : normal bulk in all four extremities [Motor Handedness Right-Handed] : the patient is right hand dominant [Hand Weakness Right] : the hand  was weak [4] : shoulder abduction 4/5 [5] : triceps 5/5 [Hand Weakness Left] : the hand  was weak [Sensation Tactile Decrease] : light touch was intact [Romberg's Sign] : Romberg's sign was negtive [Allodynia] : ~T allodynia present [Hyperesthesia] : hyperesthesia was present [Balance] : balance was intact [Abnormal Walk] : normal gait [Past-pointing] : there was no past-pointing [Dysdiadochokinesia Bilaterally] : not present [Tremor] : no tremor present [Coordination - Dysmetria Impaired Finger-to-Nose Bilateral] : not present [Coordination - Dysmetria Impaired Heel-to-Shin Bilateral] : not present [2+] : Patella left 2+ [1+] : Ankle jerk left 1+ [Plantar Reflex Right Only] : normal on the right [___] : absent on the right [Plantar Reflex Left Only] : normal on the left [___] : absent on the left [FreeTextEntry8] : Normal, narrow-based gait. No difficulty with tiptoe and heel gaits.  Some difficulty initiating tandem gait, but with no loss of balance. [FreeTextEntry7] : Allodynia and hyperesthesia to light touch and pinprick at central neck and adjacent to midline [Extraocular Movements] : extraocular movements were intact [Sclera] : the sclera and conjunctiva were normal [PERRL With Normal Accommodation] : pupils were equal in size, round, reactive to light, with normal accommodation [Outer Ear] : the ears and nose were normal in appearance [Optic Disc Abnormality] : the optic disc were normal in size and color [Oropharynx] : the oropharynx was normal [Neck Appearance] : the appearance of the neck was normal [Hearing Threshold Finger Rub Not [place]] : hearing was normal [Auscultation Breath Sounds / Voice Sounds] : lungs were clear to auscultation bilaterally [Heart Rate And Rhythm] : heart rate was normal and rhythm regular [Full Pulse] : the pedal pulses are present [Murmurs] : no murmurs [Heart Sounds] : normal S1 and S2 [Edema] : there was no peripheral edema [Bowel Sounds] : normal bowel sounds [Abdomen Soft] : soft [Abdomen Tenderness] : non-tender [FreeTextEntry1] : No tenderness to palpation below the level of the neck [No CVA Tenderness] : no ~M costovertebral angle tenderness [Nail Clubbing] : no clubbing  or cyanosis of the fingernails [Musculoskeletal - Swelling] : no joint swelling seen [] : no rash [Skin Color & Pigmentation] : normal skin color and pigmentation

## 2019-05-14 ENCOUNTER — FORM ENCOUNTER (OUTPATIENT)
Age: 44
End: 2019-05-14

## 2019-05-15 ENCOUNTER — OUTPATIENT (OUTPATIENT)
Dept: OUTPATIENT SERVICES | Facility: HOSPITAL | Age: 44
LOS: 1 days | End: 2019-05-15
Payer: MEDICAID

## 2019-05-15 ENCOUNTER — APPOINTMENT (OUTPATIENT)
Dept: MRI IMAGING | Facility: CLINIC | Age: 44
End: 2019-05-15
Payer: MEDICAID

## 2019-05-15 DIAGNOSIS — Z00.8 ENCOUNTER FOR OTHER GENERAL EXAMINATION: ICD-10-CM

## 2019-05-15 DIAGNOSIS — G93.5 COMPRESSION OF BRAIN: ICD-10-CM

## 2019-05-15 PROCEDURE — 70551 MRI BRAIN STEM W/O DYE: CPT

## 2019-05-15 PROCEDURE — 70547 MR ANGIOGRAPHY NECK W/O DYE: CPT

## 2019-05-15 PROCEDURE — 70551 MRI BRAIN STEM W/O DYE: CPT | Mod: 26

## 2019-05-15 PROCEDURE — 70547 MR ANGIOGRAPHY NECK W/O DYE: CPT | Mod: 26

## 2019-05-21 ENCOUNTER — MOBILE ON CALL (OUTPATIENT)
Age: 44
End: 2019-05-21

## 2019-05-27 NOTE — PLAN
[FreeTextEntry1] : #Headaches\par - red flag symptoms include headaches that last for hours upon awakening, and worse with bending. However, also has features c/w migraine/tension headaches including throbbing in nature, burning, photophobia.\par - sent to neurology clinic re CT scan finding for possible MRI brain\par - c/w Tylenol 500mg q6hrs\par \par Case d/w Dr. Russell

## 2019-05-27 NOTE — HISTORY OF PRESENT ILLNESS
[FreeTextEntry8] : 43F with no PMH presents for acute visit for headache. Patient started having these headaches for about a month and came to our clinic in early April and was told it could be tension headaches and prescribed her NSAIDS. The headaches got worse yesterday and she decided to go to the ED where a CT was done which showed Low-lying cerebellar tonsils are normal in morphology and project \par approximately 4-5 mm below the level of foramen magnum.  Chiari symptomatology should be excluded clinically.  MRI evaluation of the head and spine may be performed as clinically warranted.\par \par Patient describes her headaches as lasting for hours and she does wake up with headaches and worse with bending forward. Associated nausea, body weakness/aches, photophobia, phonophobia. Says Tylenol helps with the pain. No family hx of headaches

## 2019-05-28 ENCOUNTER — FORM ENCOUNTER (OUTPATIENT)
Age: 44
End: 2019-05-28

## 2019-05-29 ENCOUNTER — APPOINTMENT (OUTPATIENT)
Dept: MRI IMAGING | Facility: CLINIC | Age: 44
End: 2019-05-29
Payer: MEDICAID

## 2019-05-29 ENCOUNTER — OUTPATIENT (OUTPATIENT)
Dept: OUTPATIENT SERVICES | Facility: HOSPITAL | Age: 44
LOS: 1 days | End: 2019-05-29
Payer: MEDICAID

## 2019-05-29 DIAGNOSIS — G93.5 COMPRESSION OF BRAIN: ICD-10-CM

## 2019-05-29 DIAGNOSIS — Z00.8 ENCOUNTER FOR OTHER GENERAL EXAMINATION: ICD-10-CM

## 2019-05-29 PROCEDURE — 70544 MR ANGIOGRAPHY HEAD W/O DYE: CPT | Mod: 26

## 2019-05-29 PROCEDURE — 72141 MRI NECK SPINE W/O DYE: CPT | Mod: 26

## 2019-05-29 PROCEDURE — 70544 MR ANGIOGRAPHY HEAD W/O DYE: CPT

## 2019-05-29 PROCEDURE — 72141 MRI NECK SPINE W/O DYE: CPT

## 2019-06-04 ENCOUNTER — MOBILE ON CALL (OUTPATIENT)
Age: 44
End: 2019-06-04

## 2019-08-19 ENCOUNTER — APPOINTMENT (OUTPATIENT)
Dept: NEUROLOGY | Facility: CLINIC | Age: 44
End: 2019-08-19
Payer: MEDICAID

## 2019-08-19 VITALS
SYSTOLIC BLOOD PRESSURE: 128 MMHG | WEIGHT: 118 LBS | HEIGHT: 61 IN | DIASTOLIC BLOOD PRESSURE: 71 MMHG | HEART RATE: 73 BPM | BODY MASS INDEX: 22.28 KG/M2 | OXYGEN SATURATION: 99 %

## 2019-08-19 PROCEDURE — 99215 OFFICE O/P EST HI 40 MIN: CPT

## 2019-08-19 NOTE — HISTORY OF PRESENT ILLNESS
[FreeTextEntry1] : FROM 5/7/19:\par \par This is a 43-year-old right-handed woman who has had new dull headaches over the past month, radiating to the neck as a burning sensation, relieved by over-the-counter acetaminophen. On April 28, 2019, the headache became very severe, feeling like "squeezing" at both sides of the head, associated with nausea and and "weak" feeling in the eyes. She presented to the [facility] ED and had a CT Head that raised concern for low-lying cerebellar tonsils. She was treated with some pain medications and was discharged, although the same headache with nausea and "weak" eyes occurred last week. Even when she does not have a headache, she feels weak in the legs, especially when walking up and down stairs.\par \par FROM 8/19/19:\par \par The patient states that she since she started taking amitriptyline and PRN metoclopramide, her headaches and neck pain have decreased in frequency, but she still sometimes experiences sharp pains at the right side of her head and pressure in her neck, but this happens up to 1-2 times per week, and not every day as before. She notes that stress at home or at work tends to trigger or aggravate these aches. She finds that metoclopramide makes her tired during the day, so she does not take that often. Since the last clinic visit, she has also noticed some mild burning pain in both of her calves when she walks, but this has improved since she started taking magnesium 500mg daily.\par

## 2019-08-19 NOTE — PHYSICAL EXAM
[General Appearance - Alert] : alert [General Appearance - Well Nourished] : well nourished [General Appearance - Well Developed] : well developed [Oriented To Time, Place, And Person] : oriented to person, place, and time [Impaired Insight] : insight and judgment were intact [Affect] : the affect was normal [Person] : oriented to person [Place] : oriented to place [Time] : oriented to time [Concentration Intact] : normal concentrating ability [Visual Intact] : visual attention was ~T not ~L decreased [Naming Objects] : no difficulty naming common objects [Repeating Phrases] : no difficulty repeating a phrase [Fluency] : fluency intact [Comprehension] : comprehension intact [Cranial Nerves Optic (II)] : visual acuity intact bilaterally,  visual fields full to confrontation, pupils equal round and reactive to light [Cranial Nerves Oculomotor (III)] : extraocular motion intact [Cranial Nerves Trigeminal (V)] : facial sensation intact symmetrically [Cranial Nerves Vestibulocochlear (VIII)] : hearing was intact bilaterally [Cranial Nerves Facial (VII)] : face symmetrical [Cranial Nerves Glossopharyngeal (IX)] : tongue and palate midline [Cranial Nerves Accessory (XI - Cranial And Spinal)] : head turning and shoulder shrug symmetric [Cranial Nerves Hypoglossal (XII)] : there was no tongue deviation with protrusion [No Muscle Atrophy] : normal bulk in all four extremities [Motor Strength] : muscle strength was normal in all four extremities [Motor Handedness Right-Handed] : the patient is right hand dominant [Hand Weakness Right] : the hand  was weak [4] : shoulder abduction 4/5 [5] : triceps 5/5 [Hand Weakness Left] : the hand  was weak [Sensation Tactile Decrease] : light touch was intact [Balance] : balance was intact [2+] : Patella left 2+ [1+] : Ankle jerk right 1+ [Sclera] : the sclera and conjunctiva were normal [PERRL With Normal Accommodation] : pupils were equal in size, round, reactive to light, with normal accommodation [Extraocular Movements] : extraocular movements were intact [Optic Disc Abnormality] : the optic disc were normal in size and color [Outer Ear] : the ears and nose were normal in appearance [Hearing Threshold Finger Rub Not [place]] : hearing was normal [Oropharynx] : the oropharynx was normal [Neck Appearance] : the appearance of the neck was normal [Auscultation Breath Sounds / Voice Sounds] : lungs were clear to auscultation bilaterally [Heart Rate And Rhythm] : heart rate was normal and rhythm regular [Heart Sounds] : normal S1 and S2 [Murmurs] : no murmurs [Full Pulse] : the pedal pulses are present [Bowel Sounds] : normal bowel sounds [Edema] : there was no peripheral edema [Abdomen Tenderness] : non-tender [Abdomen Soft] : soft [No CVA Tenderness] : no ~M costovertebral angle tenderness [Nail Clubbing] : no clubbing  or cyanosis of the fingernails [Musculoskeletal - Swelling] : no joint swelling seen [] : no rash [Skin Color & Pigmentation] : normal skin color and pigmentation [General Appearance - In No Acute Distress] : in no acute distress [Sensation Pain / Temperature Decrease] : pain and temperature was intact [Arterial Pulses Carotid] : carotid pulses were normal with no bruits [No Spinal Tenderness] : no spinal tenderness [Abnormal Walk] : normal gait [Motor Tone] : muscle strength and tone were normal [Paresis Pronator Drift Right-Sided] : no pronator drift on the right [Paresis Pronator Drift Left-Sided] : no pronator drift on the left [Motor Strength Upper Extremities Bilaterally] : strength was normal in both upper extremities [Motor Strength Lower Extremities Bilaterally] : strength was normal in both lower extremities [Romberg's Sign] : Romberg's sign was negtive [Allodynia] : no ~T allodynia present [Hyperesthesia] : no hyperesthesia [Past-pointing] : there was no past-pointing [Tremor] : no tremor present [Dysdiadochokinesia Bilaterally] : not present [Coordination - Dysmetria Impaired Finger-to-Nose Bilateral] : not present [Coordination - Dysmetria Impaired Heel-to-Shin Bilateral] : not present [Plantar Reflex Right Only] : normal on the right [Plantar Reflex Left Only] : normal on the left [___] : absent on the right [___] : absent on the left [FreeTextEntry8] : Normal, narrow-based gait. No difficulty with tiptoe, heel, and tandem gaits. [FreeTextEntry1] : No tenderness to palpation at posterior head or neck

## 2019-08-19 NOTE — REVIEW OF SYSTEMS
[As Noted in HPI] : as noted in HPI [Negative] : Integumentary [FreeTextEntry9] : Neck pain as in HPI

## 2019-08-19 NOTE — DATA REVIEWED
[de-identified] : MRI Brain & MRA Neck (5/15/19):\par - No acute intracranial abnormality\par - Chiari I malformation\par - No neck vessel abnormalities\par \par MRA Brain & MRI Cervical Spine (5/29/19):\par - No intracranial vessel abnormalities\par - Chiari I malformation without syrinx\par - Mild multilevel cervical spondylosis [de-identified] : CT Head (4/28/19):\par - Low-lying cerebellar tonsils, 4-5 mm below level of foramen magnum

## 2019-08-19 NOTE — ASSESSMENT
[FreeTextEntry1] : 43 RHF with pressure-like neck pain and sharp headaches in the setting of Chiari I malformation, without evidence of syringomyelia, and improved with use of amitriptyline.

## 2019-12-29 ENCOUNTER — EMERGENCY (EMERGENCY)
Facility: HOSPITAL | Age: 44
LOS: 1 days | Discharge: HOME | End: 2019-12-29
Attending: EMERGENCY MEDICINE
Payer: MEDICAID

## 2019-12-29 VITALS
TEMPERATURE: 99 F | OXYGEN SATURATION: 97 % | WEIGHT: 119.93 LBS | SYSTOLIC BLOOD PRESSURE: 134 MMHG | RESPIRATION RATE: 18 BRPM | HEIGHT: 61 IN | DIASTOLIC BLOOD PRESSURE: 71 MMHG | HEART RATE: 101 BPM

## 2019-12-29 LAB
ALBUMIN SERPL ELPH-MCNC: 4 G/DL — SIGNIFICANT CHANGE UP (ref 3.3–5)
ALP SERPL-CCNC: 75 U/L — SIGNIFICANT CHANGE UP (ref 40–120)
ALT FLD-CCNC: 18 U/L — SIGNIFICANT CHANGE UP (ref 10–45)
ANION GAP SERPL CALC-SCNC: 14 MMOL/L — SIGNIFICANT CHANGE UP (ref 5–17)
APPEARANCE UR: ABNORMAL
AST SERPL-CCNC: 27 U/L — SIGNIFICANT CHANGE UP (ref 10–40)
BACTERIA # UR AUTO: ABNORMAL
BASOPHILS # BLD AUTO: 0.01 K/UL — SIGNIFICANT CHANGE UP (ref 0–0.2)
BASOPHILS NFR BLD AUTO: 0.2 % — SIGNIFICANT CHANGE UP (ref 0–2)
BILIRUB SERPL-MCNC: 0.2 MG/DL — SIGNIFICANT CHANGE UP (ref 0.2–1.2)
BILIRUB UR-MCNC: NEGATIVE — SIGNIFICANT CHANGE UP
BUN SERPL-MCNC: 11 MG/DL — SIGNIFICANT CHANGE UP (ref 7–23)
CALCIUM SERPL-MCNC: 9.4 MG/DL — SIGNIFICANT CHANGE UP (ref 8.4–10.5)
CHLORIDE SERPL-SCNC: 98 MMOL/L — SIGNIFICANT CHANGE UP (ref 96–108)
CO2 SERPL-SCNC: 23 MMOL/L — SIGNIFICANT CHANGE UP (ref 22–31)
COLOR SPEC: SIGNIFICANT CHANGE UP
CREAT SERPL-MCNC: 0.83 MG/DL — SIGNIFICANT CHANGE UP (ref 0.5–1.3)
DIFF PNL FLD: ABNORMAL
EOSINOPHIL # BLD AUTO: 0.1 K/UL — SIGNIFICANT CHANGE UP (ref 0–0.5)
EOSINOPHIL NFR BLD AUTO: 2.2 % — SIGNIFICANT CHANGE UP (ref 0–6)
EPI CELLS # UR: 51 — SIGNIFICANT CHANGE UP
FLU A RESULT: DETECTED
FLU A RESULT: DETECTED
FLUAV AG NPH QL: DETECTED
FLUBV AG NPH QL: SIGNIFICANT CHANGE UP
GLUCOSE SERPL-MCNC: 99 MG/DL — SIGNIFICANT CHANGE UP (ref 70–99)
GLUCOSE UR QL: NEGATIVE — SIGNIFICANT CHANGE UP
HCT VFR BLD CALC: 41.3 % — SIGNIFICANT CHANGE UP (ref 34.5–45)
HGB BLD-MCNC: 13 G/DL — SIGNIFICANT CHANGE UP (ref 11.5–15.5)
HYALINE CASTS # UR AUTO: 1 /LPF — SIGNIFICANT CHANGE UP (ref 0–7)
KETONES UR-MCNC: NEGATIVE — SIGNIFICANT CHANGE UP
LEUKOCYTE ESTERASE UR-ACNC: ABNORMAL
LIDOCAIN IGE QN: 55 U/L — SIGNIFICANT CHANGE UP (ref 7–60)
LYMPHOCYTES # BLD AUTO: 1.71 K/UL — SIGNIFICANT CHANGE UP (ref 1–3.3)
LYMPHOCYTES # BLD AUTO: 37.6 % — SIGNIFICANT CHANGE UP (ref 13–44)
MCHC RBC-ENTMCNC: 26.2 PG — LOW (ref 27–34)
MCHC RBC-ENTMCNC: 31.5 GM/DL — LOW (ref 32–36)
MCV RBC AUTO: 83.1 FL — SIGNIFICANT CHANGE UP (ref 80–100)
MONOCYTES # BLD AUTO: 0.62 K/UL — SIGNIFICANT CHANGE UP (ref 0–0.9)
MONOCYTES NFR BLD AUTO: 13.6 % — SIGNIFICANT CHANGE UP (ref 2–14)
NEUTROPHILS # BLD AUTO: 2.11 K/UL — SIGNIFICANT CHANGE UP (ref 1.8–7.4)
NEUTROPHILS NFR BLD AUTO: 46.4 % — SIGNIFICANT CHANGE UP (ref 43–77)
NITRITE UR-MCNC: NEGATIVE — SIGNIFICANT CHANGE UP
NRBC # BLD: 0 /100 WBCS — SIGNIFICANT CHANGE UP (ref 0–0)
PH UR: 6 — SIGNIFICANT CHANGE UP (ref 5–8)
PLATELET # BLD AUTO: 173 K/UL — SIGNIFICANT CHANGE UP (ref 150–400)
POTASSIUM SERPL-MCNC: 4.1 MMOL/L — SIGNIFICANT CHANGE UP (ref 3.5–5.3)
POTASSIUM SERPL-SCNC: 4.1 MMOL/L — SIGNIFICANT CHANGE UP (ref 3.5–5.3)
PROT SERPL-MCNC: 8 G/DL — SIGNIFICANT CHANGE UP (ref 6–8.3)
PROT UR-MCNC: NEGATIVE — SIGNIFICANT CHANGE UP
RBC # BLD: 4.97 M/UL — SIGNIFICANT CHANGE UP (ref 3.8–5.2)
RBC # FLD: 12.4 % — SIGNIFICANT CHANGE UP (ref 10.3–14.5)
RBC CASTS # UR COMP ASSIST: 5 /HPF — HIGH (ref 0–4)
RSV RESULT: SIGNIFICANT CHANGE UP
RSV RNA RESP QL NAA+PROBE: SIGNIFICANT CHANGE UP
SODIUM SERPL-SCNC: 135 MMOL/L — SIGNIFICANT CHANGE UP (ref 135–145)
SP GR SPEC: 1.01 — SIGNIFICANT CHANGE UP (ref 1.01–1.02)
UROBILINOGEN FLD QL: NEGATIVE — SIGNIFICANT CHANGE UP
WBC # BLD: 4.55 K/UL — SIGNIFICANT CHANGE UP (ref 3.8–10.5)
WBC # FLD AUTO: 4.55 K/UL — SIGNIFICANT CHANGE UP (ref 3.8–10.5)
WBC UR QL: 4 /HPF — SIGNIFICANT CHANGE UP (ref 0–5)

## 2019-12-29 PROCEDURE — 99284 EMERGENCY DEPT VISIT MOD MDM: CPT

## 2019-12-29 PROCEDURE — 71046 X-RAY EXAM CHEST 2 VIEWS: CPT | Mod: 26

## 2019-12-29 RX ORDER — ACETAMINOPHEN 500 MG
650 TABLET ORAL ONCE
Refills: 0 | Status: COMPLETED | OUTPATIENT
Start: 2019-12-29 | End: 2019-12-29

## 2019-12-29 RX ORDER — SODIUM CHLORIDE 9 MG/ML
1000 INJECTION, SOLUTION INTRAVENOUS ONCE
Refills: 0 | Status: COMPLETED | OUTPATIENT
Start: 2019-12-29 | End: 2019-12-29

## 2019-12-29 RX ADMIN — SODIUM CHLORIDE 1000 MILLILITER(S): 9 INJECTION, SOLUTION INTRAVENOUS at 22:32

## 2019-12-29 RX ADMIN — Medication 650 MILLIGRAM(S): at 22:32

## 2019-12-29 NOTE — ED ADULT NURSE NOTE - OBJECTIVE STATEMENT
45 yo f no pertinent pmhx presents to the ED with c/o flike like symptoms, cough non productive, and suprapubic pain with yellow/ green discharge for the past 2 days. Pt reports sick contact at home, states her son had the flu and felt she got it. Reports sore throat, cough, generalized body ache, recent suprapubic pain as well as discharge. Pt denies headache, dizziness, chest pain, palpitations, SOB, n/v/d.

## 2019-12-29 NOTE — ED PROVIDER NOTE - ATTENDING CONTRIBUTION TO CARE
43yo f pmhx chiari malformation pw flu like illness for 3 days with sick contact at home, vss, supportive care, labs, cxr likely dc home, well appearing.

## 2019-12-29 NOTE — ED PROVIDER NOTE - OBJECTIVE STATEMENT
45yo f pmhx chiari malformation pw flu like illness for 3 days. Patient reports fever, headache, myalgias, malaise, non-productive cough, sore throat, rhinorrhea. Reports worsening of symptoms since onset. Denies having received the influenza vaccine this year. Denies sick contacts. Denies smoking history. Denies chest pain, shortness of breath, abdominal pain, nausea, vomiting, diarrhea, constipation, patient has mild abd cramping with urination and whitish discharge. for 2 days. denies vaginal itching and has had normal std testing in the past year. no hx of sti or hiv. no new sexual partners

## 2019-12-29 NOTE — ED PROVIDER NOTE - NSFOLLOWUPINSTRUCTIONS_ED_ALL_ED_FT
Viral Respiratory Infection    A viral respiratory infection is an illness that affects parts of the body used for breathing, like the lungs, nose, and throat. It is caused by a germ called a virus. Symptoms can include runny nose, coughing, sneezing, fatigue, body aches, sore throat, fever, or headache. Over the counter medicine can be used to manage the symptoms but the infection typically goes away on its own in 5 to 10 days.     SEEK IMMEDIATE MEDICAL CARE IF YOU HAVE ANY OF THE FOLLOWING SYMPTOMS: shortness of breath, chest pain, fever over 10 days, or lightheadedness/dizziness.    Viral Illness    Viruses are tiny germs that can get into a person's body and cause illness. There are many different types of viruses, and they cause many types of illness. Viral illnesses can range from mild to severe. They can affect various parts of the body. Common illnesses that are caused by a virus include colds and the flu. Viral illnesses also include serious conditions such as HIV/AIDS (human immunodeficiency virus/acquired immunodeficiency syndrome). A few viruses have been linked to certain cancers.    What are the causes?    Many types of viruses can cause illness. Viruses invade cells in your body, multiply, and cause the infected cells to malfunction or die. When the cell dies, it releases more of the virus. When this happens, you develop symptoms of the illness, and the virus continues to spread to other cells. If the virus takes over the function of the cell, it can cause the cell to divide and grow out of control, as is the case when a virus causes cancer.  Different viruses get into the body in different ways. You can get a virus by:    Swallowing food or water that is contaminated with the virus.  Breathing in droplets that have been coughed or sneezed into the air by an infected person.  Touching a surface that has been contaminated with the virus and then touching your eyes, nose, or mouth.  Being bitten by an insect or animal that carries the virus.  Having sexual contact with a person who is infected with the virus.  Being exposed to blood or fluids that contain the virus, either through an open cut or during a transfusion.    If a virus enters your body, your body's defense system (immune system) will try to fight the virus. You may be at higher risk for a viral illness if your immune system is weak.    What are the signs or symptoms?    Symptoms vary depending on the type of virus and the location of the cells that it invades. Common symptoms of the main types of viral illnesses include:    Cold and flu viruses   Fever.  Headache.  Sore throat.  Muscle aches.  Nasal congestion.  Cough.    Digestive system (gastrointestinal) viruses   Fever.  Abdominal pain.  Nausea.  Diarrhea.    Liver viruses (hepatitis)   Loss of appetite.  Tiredness.  Yellowing of the skin (jaundice).    Brain and spinal cord viruses   Fever.  Headache.  Stiff neck.  Nausea and vomiting.  Confusion or sleepiness.    Skin viruses   Warts.  Itching.  Rash.    Sexually transmitted viruses   Discharge.  Swelling.  Redness.  Rash.    How is this treated?    Viruses can be difficult to treat because they live within cells. Antibiotic medicines do not treat viruses because these drugs do not get inside cells.     Treatment for a viral illness may include:    Resting and drinking plenty of fluids.  Medicines to relieve symptoms. These can include over-the-counter medicine for pain and fever, medicines for cough or congestion, and medicines to relieve diarrhea.  Antiviral medicines. These drugs are available only for certain types of viruses. They may help reduce flu symptoms if taken early. There are also many antiviral medicines for hepatitis and HIV/AIDS.    ****Some viral illnesses can be prevented with vaccinations. A common example is the flu shot.****    Follow these instructions at home:    Medicines   Take over-the-counter and prescription medicines only as told by your health care provider.If you were prescribed an antiviral medicine, take it as told by your health care provider. Do not stop taking the medicine even if you start to feel better.Be aware of when antibiotics are needed and when they are not needed. Antibiotics do not treat viruses. If your health care provider thinks that you may have a bacterial infection as well as a viral infection, you may get an antibiotic.    Do not ask for an antibiotic prescription if you have been diagnosed with a viral illness. That will not make your illness go away faster.Frequently taking antibiotics when they are not needed can lead to antibiotic resistance. When this develops, the medicine no longer works against the bacteria that it normally fights.General instructions     Drink enough fluids to keep your urine clear or pale yellow.Rest as much as possible.Return to your normal activities as told by your health care provider. Ask your health care provider what activities are safe for you.Keep all follow-up visits as told by your health care provider. This is important.How is this prevented?    Take these actions to reduce your risk of viral infection:    Eat a healthy diet and get enough rest.  Wash your hands often with soap and water.   This is especially important when you are in public places.   If soap and water are not available, use hand .  Avoid close contact with friends and family who have a viral illness.  If you travel to areas where viral gastrointestinal infection is common, avoid drinking water or eating raw food.  Keep your immunizations up to date. Get a flu shot every year as told by your health care provider.  Do not share toothbrushes, nail clippers, razors, or needles with other people.  Always practice safe sex.    Contact a health care provider if:  You have symptoms of a viral illness that do not go away.  Your symptoms come back after going away.  Your symptoms get worse.    Get help right away if:  You have trouble breathing.  You have a severe headache or a stiff neck.  You have severe vomiting or abdominal pain.  This information is not intended to replace advice given to you by your health care provider.   Make sure you discuss any questions you have with your health care provider.    Urinary Tract Infection    A urinary tract infection (UTI) is an infection of any part of the urinary tract, which includes the kidneys, ureters, bladder, and urethra. Risk factors include ignoring your need to urinate, wiping back to front if female, being an uncircumcised male, and having diabetes or a weak immune system. Symptoms include frequent urination, pain or burning with urination, foul smelling urine, cloudy urine, pain in the lower abdomen, blood in the urine, and fever. If you were prescribed an antibiotic medicine, take it as told by your health care provider. Do not stop taking the antibiotic even if you start to feel better.    SEEK IMMEDIATE MEDICAL CARE IF YOU HAVE ANY OF THE FOLLOWING SYMPTOMS: severe back or abdominal pain, fever, inability to keep fluids or medicine down, dizziness/lightheadedness, or a change in mental status.

## 2019-12-29 NOTE — ED PROVIDER NOTE - NS ED ROS FT
ROS:  GENERAL: + fever, + chills  EYES: no change in vision  HEENT: no trouble swallowing, no trouble speaking  CARDIAC: no chest pain  PULMONARY: +cough, no shortness of breath  GI: no abdominal pain, no nausea, no vomiting, no diarrhea, no constipation  : No dysuria, no frequency, no change in appearance, or odor of urine  SKIN: no rashes  NEURO: + headache, + weakness  MSK: + joint pain  ~Ray Peacock D.O. -Resident

## 2019-12-29 NOTE — ED PROVIDER NOTE - TOBACCO USE
FYI: TCM outreach completed. Sent TE to PCP office re: assistance scheduling HFU appt.
Unknown if ever smoked

## 2019-12-29 NOTE — ED PROVIDER NOTE - PATIENT PORTAL LINK FT
You can access the FollowMyHealth Patient Portal offered by [facility] by registering at the following website: [email or web address]. By joining [facility]’s FollowMyHealth portal, you will also be able to view your health information using other applications (apps) compatible with our system.

## 2019-12-29 NOTE — ED PROVIDER NOTE - PHYSICAL EXAMINATION
Physical Exam:  Gen: NAD, AOx3, non-toxic appearing, able to ambulate without assistance  Head: no scalp abnormalities  Eyes: eyes are aligned, lids, conjunctivae and sclera are normal; pupils are 3mm and equal; brisk response to light; extraocular movements intact  Ears: Outer ear without lesions, normal acuity  Nose/Sinuses: Nasal mucosa mildly-inflamed, nasal septum midline, no tenderness over maxillary or frontal sinuses  Mouth: Normal lips, tongue, gums and teeth, pharynx is mildly erythematous, tonsils normal sized and without exudates, uvula is midline, oral mucosa dry  Neck: Nontender bilateral tonsilar and anterior cervical nodes, no occipital, auricular, submandibular, submental or supraclavicular nodes, trachea in midline, thyroid lobes not palpable, no neck stiffness or midline spinal tenderness  Lung: CTAB, no respiratory distress, no wheezes/rhonchi/rales B/L, speaking in full sentences  CV: RRR, no murmurs, rubs or gallops  Abd: soft, NTND, no guarding, no rigidity, no CVA tenderness   MSK: no visible deformities, ROM normal in UE/LE, no back pain  Neuro: No focal sensory or motor deficits  Skin: Warm, well perfused, no rash  Psych: normal affect, calm  ~Ray Peacock D.O. -Resident

## 2019-12-29 NOTE — ED PROVIDER NOTE - CARE PLAN
Principal Discharge DX:	Urinary tract infection without hematuria, site unspecified  Secondary Diagnosis:	Yeast infection of the vagina  Secondary Diagnosis:	Flu

## 2019-12-29 NOTE — ED PROVIDER NOTE - CLINICAL SUMMARY MEDICAL DECISION MAKING FREE TEXT BOX
43yo f pmhx chiari malformation F pw, concern for Influenza, URI, PNA, will do flu pcr, fluids for dehydration, tylenol for fever, toradol for analgesia, cxr, r/o pna, labs, reassess. POCT preg,  no tamiflu patient >48hrs. will send gc/ch pcr ua and pelvic for discharge

## 2019-12-29 NOTE — ED ADULT NURSE REASSESSMENT NOTE - NS ED NURSE REASSESS COMMENT FT1
Pt AAOx4, NAD, resp nonlabored, resting comfortably in bed, pt currently c/o coughing . Pt denies headache, dizziness, chest pain, palpitations, n/v/d, fevers, chills, weakness at this time. Pt awaiting dispo . Safety maintained.

## 2019-12-30 VITALS
RESPIRATION RATE: 18 BRPM | HEART RATE: 92 BPM | DIASTOLIC BLOOD PRESSURE: 87 MMHG | OXYGEN SATURATION: 97 % | SYSTOLIC BLOOD PRESSURE: 121 MMHG

## 2019-12-30 LAB
C TRACH RRNA SPEC QL NAA+PROBE: SIGNIFICANT CHANGE UP
CULTURE RESULTS: SIGNIFICANT CHANGE UP
N GONORRHOEA RRNA SPEC QL NAA+PROBE: SIGNIFICANT CHANGE UP
SPECIMEN SOURCE: SIGNIFICANT CHANGE UP
SPECIMEN SOURCE: SIGNIFICANT CHANGE UP

## 2019-12-30 PROCEDURE — 80053 COMPREHEN METABOLIC PANEL: CPT

## 2019-12-30 PROCEDURE — 87491 CHLMYD TRACH DNA AMP PROBE: CPT

## 2019-12-30 PROCEDURE — 71046 X-RAY EXAM CHEST 2 VIEWS: CPT

## 2019-12-30 PROCEDURE — 87591 N.GONORRHOEAE DNA AMP PROB: CPT

## 2019-12-30 PROCEDURE — 83690 ASSAY OF LIPASE: CPT

## 2019-12-30 PROCEDURE — 87086 URINE CULTURE/COLONY COUNT: CPT

## 2019-12-30 PROCEDURE — 99283 EMERGENCY DEPT VISIT LOW MDM: CPT

## 2019-12-30 PROCEDURE — 87631 RESP VIRUS 3-5 TARGETS: CPT

## 2019-12-30 PROCEDURE — 81001 URINALYSIS AUTO W/SCOPE: CPT

## 2019-12-30 PROCEDURE — 85027 COMPLETE CBC AUTOMATED: CPT

## 2019-12-30 RX ORDER — CEFPODOXIME PROXETIL 100 MG
200 TABLET ORAL ONCE
Refills: 0 | Status: COMPLETED | OUTPATIENT
Start: 2019-12-30 | End: 2019-12-30

## 2019-12-30 RX ORDER — FLUCONAZOLE 150 MG/1
150 TABLET ORAL ONCE
Refills: 0 | Status: COMPLETED | OUTPATIENT
Start: 2019-12-30 | End: 2019-12-30

## 2019-12-30 RX ORDER — CEFPODOXIME PROXETIL 100 MG
1 TABLET ORAL
Qty: 14 | Refills: 0
Start: 2019-12-30 | End: 2020-01-05

## 2019-12-30 RX ADMIN — FLUCONAZOLE 150 MILLIGRAM(S): 150 TABLET ORAL at 00:54

## 2019-12-30 RX ADMIN — Medication 650 MILLIGRAM(S): at 00:35

## 2019-12-30 RX ADMIN — Medication 200 MILLIGRAM(S): at 00:54

## 2019-12-31 NOTE — DISCUSSION/SUMMARY
[FreeTextEntry1] : Pt presented to ED with flu like symptoms, found to have flu, discharged home with improvement. Called to f/u but did not  and unable to leave . Pt has appt on friday 1/3 with Dr Diaz.

## 2020-01-01 NOTE — ED PROVIDER NOTE - ATTENDING CONTRIBUTION TO CARE
I performed a history and physical exam of the patient and discussed their management with the resident. I reviewed the resident's note and agree with the documented findings and plan of care. My medical decision making and observations are found above.
[id number]

## 2020-01-03 ENCOUNTER — APPOINTMENT (OUTPATIENT)
Dept: INTERNAL MEDICINE | Facility: CLINIC | Age: 45
End: 2020-01-03
Payer: MEDICAID

## 2020-01-03 ENCOUNTER — OUTPATIENT (OUTPATIENT)
Dept: OUTPATIENT SERVICES | Facility: HOSPITAL | Age: 45
LOS: 1 days | End: 2020-01-03
Payer: MEDICAID

## 2020-01-03 VITALS
DIASTOLIC BLOOD PRESSURE: 70 MMHG | HEIGHT: 61 IN | SYSTOLIC BLOOD PRESSURE: 122 MMHG | BODY MASS INDEX: 22.66 KG/M2 | WEIGHT: 120 LBS

## 2020-01-03 DIAGNOSIS — I10 ESSENTIAL (PRIMARY) HYPERTENSION: ICD-10-CM

## 2020-01-03 PROCEDURE — 99212 OFFICE O/P EST SF 10 MIN: CPT | Mod: GE

## 2020-01-03 PROCEDURE — G0463: CPT

## 2020-01-03 NOTE — ASSESSMENT
[FreeTextEntry1] : Asked patient to RTC if symptoms worsen or persist for more than 1 week.\par \par Case d/w Dr. Correa

## 2020-01-03 NOTE — REVIEW OF SYSTEMS
[Nasal Discharge] : nasal discharge [Sore Throat] : sore throat [Shortness Of Breath] : shortness of breath [Cough] : cough [Headache] : headache [Fever] : no fever [Chills] : no chills [Night Sweats] : no night sweats [Postnasal Drip] : no postnasal drip [Chest Pain] : no chest pain [Palpitations] : no palpitations [Abdominal Pain] : no abdominal pain [Nausea] : no nausea [Vomiting] : no vomiting [Hematuria] : no hematuria [Dysuria] : no dysuria [Dizziness] : no dizziness [Frequency] : no frequency [Muscle Pain] : no muscle pain

## 2020-01-03 NOTE — PHYSICAL EXAM
[Normal Outer Ear/Nose] : the outer ears and nose were normal in appearance [Supple] : supple [No Edema] : there was no peripheral edema [Soft] : abdomen soft [No CVA Tenderness] : no CVA  tenderness [Normal] : no joint swelling and grossly normal strength and tone [Coordination Grossly Intact] : coordination grossly intact [Normal Gait] : normal gait [de-identified] : No pleuritic rub [de-identified] : No hernia in inguinal region [de-identified] : no rubs

## 2020-01-03 NOTE — HISTORY OF PRESENT ILLNESS
[FreeTextEntry1] : 44F hx Chiari I malformation here for follow-up after visit to ED for influenza and UTI [de-identified] : Patient developed symptoms 12/26 of fever, headache, myalgias, malaise, non-productive cough, sore throat, rhinorrhea. Also had unilateral anterior groin, burning pain, worse when coughing. Went to ED with symptoms and tested positive for flu. Had UA with large LE, 5 wbcs, epithelial cels. Grew normal urogenital josh. Received 1 wk cefpodoxime. Patient says all symptoms have improved since discharge from ED. No longer febrile off antipyretics. Still coughing but with lighter phlegm. Still has mild headache. Burning sensation on coughing decreased. Patient also endorsing some central burning pain worse when coughing, non-positional in nature. Not worse with deep inspiration.\par

## 2020-01-05 ENCOUNTER — LABORATORY RESULT (OUTPATIENT)
Age: 45
End: 2020-01-05

## 2020-01-06 ENCOUNTER — APPOINTMENT (OUTPATIENT)
Dept: OBGYN | Facility: CLINIC | Age: 45
End: 2020-01-06
Payer: MEDICAID

## 2020-01-06 ENCOUNTER — OUTPATIENT (OUTPATIENT)
Dept: OUTPATIENT SERVICES | Facility: HOSPITAL | Age: 45
LOS: 1 days | End: 2020-01-06
Payer: MEDICAID

## 2020-01-06 VITALS — BODY MASS INDEX: 23.05 KG/M2 | DIASTOLIC BLOOD PRESSURE: 80 MMHG | SYSTOLIC BLOOD PRESSURE: 130 MMHG | WEIGHT: 122 LBS

## 2020-01-06 DIAGNOSIS — N76.0 ACUTE VAGINITIS: ICD-10-CM

## 2020-01-06 PROCEDURE — G0463: CPT

## 2020-01-06 PROCEDURE — 99213 OFFICE O/P EST LOW 20 MIN: CPT | Mod: GE

## 2020-01-08 ENCOUNTER — RESULT REVIEW (OUTPATIENT)
Age: 45
End: 2020-01-08

## 2020-01-08 NOTE — PHYSICAL EXAM
[Normal] : vagina [Vulvar Atrophy] : no vulvar atrophy [Vulvitis] : no vulvitis [Labia Majora Erythema] : no erythema of the labia majora [Erythema] : no erythema [Discharge] : no urethral discharge [Atrophy] : no atrophy

## 2020-01-13 DIAGNOSIS — J11.1 INFLUENZA DUE TO UNIDENTIFIED INFLUENZA VIRUS WITH OTHER RESPIRATORY MANIFESTATIONS: ICD-10-CM

## 2020-05-21 ENCOUNTER — APPOINTMENT (OUTPATIENT)
Dept: NEUROLOGY | Facility: CLINIC | Age: 45
End: 2020-05-21
Payer: MEDICAID

## 2020-05-21 PROCEDURE — 99215 OFFICE O/P EST HI 40 MIN: CPT | Mod: 95

## 2020-05-21 RX ORDER — AMITRIPTYLINE HYDROCHLORIDE 10 MG/1
10 TABLET, FILM COATED ORAL AT BEDTIME
Qty: 180 | Refills: 1 | Status: DISCONTINUED | COMMUNITY
Start: 2019-08-19 | End: 2020-05-21

## 2020-05-21 NOTE — DATA REVIEWED
[de-identified] : MRI Brain & MRA Neck (5/15/19):\par - No acute intracranial abnormality\par - Chiari I malformation\par - No neck vessel abnormalities\par \par MRA Brain & MRI Cervical Spine (5/29/19):\par - No intracranial vessel abnormalities\par - Chiari I malformation without syrinx\par - Mild multilevel cervical spondylosis [de-identified] : CT Head (4/28/19):\par - Low-lying cerebellar tonsils, 4-5 mm below level of foramen magnum

## 2020-05-21 NOTE — ASSESSMENT
[FreeTextEntry1] : 44 RHF with pressure-like, radiating neck pain and sharp headaches in the setting of Chiari I malformation, without evidence of syringomyelia, reporting adverse effect of excessive fatigue with amitriptyline.

## 2020-05-21 NOTE — HISTORY OF PRESENT ILLNESS
[FreeTextEntry1] : FROM 5/7/19:\par This is a 43-year-old right-handed woman who has had new dull headaches over the past month, radiating to the neck as a burning sensation, relieved by over-the-counter acetaminophen. On April 28, 2019, the headache became very severe, feeling like "squeezing" at both sides of the head, associated with nausea and and "weak" feeling in the eyes. She presented to the [facility] ED and had a CT Head that raised concern for low-lying cerebellar tonsils. She was treated with some pain medications and was discharged, although the same headache with nausea and "weak" eyes occurred last week. Even when she does not have a headache, she feels weak in the legs, especially when walking up and down stairs.\par \par FROM 8/19/19:\par The patient states that she since she started taking amitriptyline and PRN metoclopramide, her headaches and neck pain have decreased in frequency, but she still sometimes experiences sharp pains at the right side of her head and pressure in her neck, but this happens up to 1-2 times per week, and not every day as before. She notes that stress at home or at work tends to trigger or aggravate these aches. She finds that metoclopramide makes her tired during the day, so she does not take that often. Since the last clinic visit, she has also noticed some mild burning pain in both of her calves when she walks, but this has improved since she started taking magnesium 500mg daily.\par \par FROM 5/21/20:\par This appointment is conducted via real-time two-way audiovisual technology due to the current COVID-19 pandemic. Verbal consent for this encounter was received by the patient. The patient was located at her home address, and I was located in [place], [place]. The patient, now 44, notes that in March and April 2020, she had symptoms of lack of smell, lack of taste, cough, and fever, and thinks she and other relatives may have been infected with COVID-19, but they were not able to get tested for it. In the middle of all that, she stopped taking amitriptyline and PRN metoclopramide as back, and she has had recurrence in the past 4-5 days of posterior headache with burning sensation, neck pain, and radiation of pain to her right shoulder and right chest, described as a soreness. Even when she was taking the amitriptyline, she would experience excessive fatigue during the day after each 10mg nighttime dose, and therefore is not eager to resume it. She notes that she presented to the ED in December 2019 for lower abdominal pain and was diagnosed with urinary tract infection; she was treated with antibiotics and improved at home.\par \par

## 2020-05-21 NOTE — PHYSICAL EXAM
[General Appearance - Alert] : alert [General Appearance - In No Acute Distress] : in no acute distress [General Appearance - Well Nourished] : well nourished [General Appearance - Well Developed] : well developed [Oriented To Time, Place, And Person] : oriented to person, place, and time [Impaired Insight] : insight and judgment were intact [Affect] : the affect was normal [Person] : oriented to person [Place] : oriented to place [Concentration Intact] : normal concentrating ability [Time] : oriented to time [Visual Intact] : visual attention was ~T not ~L decreased [Naming Objects] : no difficulty naming common objects [Repeating Phrases] : no difficulty repeating a phrase [Comprehension] : comprehension intact [Fluency] : fluency intact [Cranial Nerves Optic (II)] : visual acuity intact bilaterally,  visual fields full to confrontation, pupils equal round and reactive to light [Cranial Nerves Trigeminal (V)] : facial sensation intact symmetrically [Cranial Nerves Oculomotor (III)] : extraocular motion intact [Cranial Nerves Facial (VII)] : face symmetrical [Cranial Nerves Vestibulocochlear (VIII)] : hearing was intact bilaterally [Cranial Nerves Accessory (XI - Cranial And Spinal)] : head turning and shoulder shrug symmetric [Cranial Nerves Glossopharyngeal (IX)] : tongue and palate midline [Cranial Nerves Hypoglossal (XII)] : there was no tongue deviation with protrusion [Motor Tone] : muscle tone was normal in all four extremities [Motor Strength] : muscle strength was normal in all four extremities [No Muscle Atrophy] : normal bulk in all four extremities [Motor Handedness Right-Handed] : the patient is right hand dominant [Sensation Tactile Decrease] : light touch was intact [Sensation Pain / Temperature Decrease] : pain and temperature was intact [Balance] : balance was intact [Sclera] : the sclera and conjunctiva were normal [Extraocular Movements] : extraocular movements were intact [Outer Ear] : the ears and nose were normal in appearance [Hearing Threshold Finger Rub Not [place]] : hearing was normal [Oropharynx] : the oropharynx was normal [Neck Appearance] : the appearance of the neck was normal [Abnormal Walk] : normal gait [Skin Color & Pigmentation] : normal skin color and pigmentation [] : no rash [Paresis Pronator Drift Right-Sided] : no pronator drift on the right [Motor Strength Upper Extremities Bilaterally] : strength was normal in both upper extremities [Paresis Pronator Drift Left-Sided] : no pronator drift on the left [Motor Strength Lower Extremities Bilaterally] : strength was normal in both lower extremities [Hyperesthesia] : no hyperesthesia [Romberg's Sign] : Romberg's sign was negtive [Past-pointing] : there was no past-pointing [Tremor] : no tremor present [Dysdiadochokinesia Bilaterally] : not present [Coordination - Dysmetria Impaired Finger-to-Nose Bilateral] : not present [Coordination - Dysmetria Impaired Heel-to-Shin Bilateral] : not present [FreeTextEntry8] : Normal, narrow-based gait. No difficulty with tiptoe, heel, and tandem gaits. [FreeTextEntry1] : Neck pain elicited with neck flexion and with rightward head turn

## 2020-05-21 NOTE — REVIEW OF SYSTEMS
[As Noted in HPI] : as noted in HPI [Negative] : Heme/Lymph [FreeTextEntry9] : Neck and right shoulder pain as in HPI

## 2020-08-21 ENCOUNTER — APPOINTMENT (OUTPATIENT)
Dept: NEUROLOGY | Facility: CLINIC | Age: 45
End: 2020-08-21
Payer: MEDICAID

## 2020-08-21 VITALS
SYSTOLIC BLOOD PRESSURE: 131 MMHG | HEART RATE: 94 BPM | WEIGHT: 121 LBS | BODY MASS INDEX: 22.84 KG/M2 | DIASTOLIC BLOOD PRESSURE: 79 MMHG | HEIGHT: 61 IN

## 2020-08-21 VITALS — TEMPERATURE: 97.2 F

## 2020-08-21 PROCEDURE — 99215 OFFICE O/P EST HI 40 MIN: CPT

## 2020-08-21 RX ORDER — AMITRIPTYLINE HYDROCHLORIDE 10 MG/1
10 TABLET, FILM COATED ORAL
Qty: 46 | Refills: 2 | Status: DISCONTINUED | COMMUNITY
Start: 2019-05-07 | End: 2020-08-21

## 2020-08-21 RX ORDER — VITAMIN E (DL,TOCOPHERYL ACET) 180 MG
500 CAPSULE ORAL
Qty: 30 | Refills: 0 | Status: DISCONTINUED | COMMUNITY
Start: 2019-08-19 | End: 2020-08-21

## 2020-08-31 NOTE — PHYSICAL EXAM
[General Appearance - Alert] : alert [General Appearance - In No Acute Distress] : in no acute distress [General Appearance - Well Nourished] : well nourished [Oriented To Time, Place, And Person] : oriented to person, place, and time [General Appearance - Well Developed] : well developed [Impaired Insight] : insight and judgment were intact [Affect] : the affect was normal [Time] : oriented to time [Person] : oriented to person [Place] : oriented to place [Visual Intact] : visual attention was ~T not ~L decreased [Concentration Intact] : normal concentrating ability [Naming Objects] : no difficulty naming common objects [Repeating Phrases] : no difficulty repeating a phrase [Fluency] : fluency intact [Comprehension] : comprehension intact [Cranial Nerves Optic (II)] : visual acuity intact bilaterally,  visual fields full to confrontation, pupils equal round and reactive to light [Cranial Nerves Oculomotor (III)] : extraocular motion intact [Cranial Nerves Trigeminal (V)] : facial sensation intact symmetrically [Cranial Nerves Vestibulocochlear (VIII)] : hearing was intact bilaterally [Cranial Nerves Facial (VII)] : face symmetrical [Cranial Nerves Glossopharyngeal (IX)] : tongue and palate midline [Cranial Nerves Accessory (XI - Cranial And Spinal)] : head turning and shoulder shrug symmetric [Cranial Nerves Hypoglossal (XII)] : there was no tongue deviation with protrusion [Motor Tone] : muscle tone was normal in all four extremities [Motor Strength] : muscle strength was normal in all four extremities [No Muscle Atrophy] : normal bulk in all four extremities [Motor Handedness Right-Handed] : the patient is right hand dominant [Sensation Tactile Decrease] : light touch was intact [Sensation Pain / Temperature Decrease] : pain and temperature was intact [Balance] : balance was intact [Sclera] : the sclera and conjunctiva were normal [Extraocular Movements] : extraocular movements were intact [Outer Ear] : the ears and nose were normal in appearance [Hearing Threshold Finger Rub Not [place]] : hearing was normal [Oropharynx] : the oropharynx was normal [Neck Appearance] : the appearance of the neck was normal [Abnormal Walk] : normal gait [Skin Color & Pigmentation] : normal skin color and pigmentation [] : no rash [Paresis Pronator Drift Right-Sided] : no pronator drift on the right [Motor Strength Upper Extremities Bilaterally] : strength was normal in both upper extremities [Paresis Pronator Drift Left-Sided] : no pronator drift on the left [Motor Strength Lower Extremities Bilaterally] : strength was normal in both lower extremities [Romberg's Sign] : Romberg's sign was negtive [Hyperesthesia] : no hyperesthesia [Past-pointing] : there was no past-pointing [Tremor] : no tremor present [Dysdiadochokinesia Bilaterally] : not present [Coordination - Dysmetria Impaired Finger-to-Nose Bilateral] : not present [Coordination - Dysmetria Impaired Heel-to-Shin Bilateral] : not present [Plantar Reflex Right Only] : normal on the right [2+] : Ankle jerk left 2+ [Plantar Reflex Left Only] : normal on the left [___] : absent on the right [___] : absent on the left [FreeTextEntry8] : Normal, narrow-based gait. No difficulty with tiptoe, heel, and tandem gaits. [PERRL With Normal Accommodation] : pupils were equal in size, round, reactive to light, with normal accommodation [FreeTextEntry1] : Mild tenderness to palpation at posterior neck [Auscultation Breath Sounds / Voice Sounds] : lungs were clear to auscultation bilaterally [Heart Rate And Rhythm] : heart rate was normal and rhythm regular [Heart Sounds] : normal S1 and S2 [Arterial Pulses Carotid] : carotid pulses were normal with no bruits [Abdomen Tenderness] : non-tender [Full Pulse] : the pedal pulses are present [Abdomen Soft] : soft [No CVA Tenderness] : no ~M costovertebral angle tenderness [No Spinal Tenderness] : no spinal tenderness

## 2020-08-31 NOTE — ASSESSMENT
[FreeTextEntry1] : 44 RHF with improvement in headaches and nausea in the setting of Chiari I malformation.

## 2020-08-31 NOTE — DATA REVIEWED
[de-identified] : MRI Brain & MRA Neck (5/15/19):\par - No acute intracranial abnormality\par - Chiari I malformation\par - No neck vessel abnormalities\par \par MRA Brain & MRI Cervical Spine (5/29/19):\par - No intracranial vessel abnormalities\par - Chiari I malformation without syrinx\par - Mild multilevel cervical spondylosis [de-identified] : CT Head (4/28/19):\par - Low-lying cerebellar tonsils, 4-5 mm below level of foramen magnum

## 2020-08-31 NOTE — HISTORY OF PRESENT ILLNESS
[FreeTextEntry1] : FROM 5/7/19:\par This is a 43-year-old right-handed woman who has had new dull headaches over the past month, radiating to the neck as a burning sensation, relieved by over-the-counter acetaminophen. On April 28, 2019, the headache became very severe, feeling like "squeezing" at both sides of the head, associated with nausea and and "weak" feeling in the eyes. She presented to the [facility] ED and had a CT Head that raised concern for low-lying cerebellar tonsils. She was treated with some pain medications and was discharged, although the same headache with nausea and "weak" eyes occurred last week. Even when she does not have a headache, she feels weak in the legs, especially when walking up and down stairs.\par \par FROM 8/19/19:\par The patient states that she since she started taking amitriptyline and PRN metoclopramide, her headaches and neck pain have decreased in frequency, but she still sometimes experiences sharp pains at the right side of her head and pressure in her neck, but this happens up to 1-2 times per week, and not every day as before. She notes that stress at home or at work tends to trigger or aggravate these aches. She finds that metoclopramide makes her tired during the day, so she does not take that often. Since the last clinic visit, she has also noticed some mild burning pain in both of her calves when she walks, but this has improved since she started taking magnesium 500mg daily.\par \par FROM 5/21/20:\par This appointment is conducted via real-time two-way audiovisual technology due to the current COVID-19 pandemic. Verbal consent for this encounter was received by the patient. The patient was located at her home address, and I was located in [place], [place]. The patient, now 44, notes that in March and April 2020, she had symptoms of lack of smell, lack of taste, cough, and fever, and thinks she and other relatives may have been infected with COVID-19, but they were not able to get tested for it. In the middle of all that, she stopped taking amitriptyline and PRN metoclopramide as back, and she has had recurrence in the past 4-5 days of posterior headache with burning sensation, neck pain, and radiation of pain to her right shoulder and right chest, described as a soreness. Even when she was taking the amitriptyline, she would experience excessive fatigue during the day after each 10mg nighttime dose, and therefore is not eager to resume it. She notes that she presented to the ED in December 2019 for lower abdominal pain and was diagnosed with urinary tract infection; she was treated with antibiotics and improved at home.\par \par FROM 8/21/20:\par Since the last clinic visit, the patient has not had severe pain, and had used cyclobenzaprine 5mg PO QHS since the last visit on 5/21/20. However, she has not needed to use it on a standing basis since then, and only uses it as needed. She recounts that she had one episode of left-sided chest discomfort over the course of one day since I last saw her, but it resolved without her seeking medical attention, and she attributes it to stress. Otherwise, she has not had any headache, neck pain, or other body pain.

## 2020-11-23 ENCOUNTER — EMERGENCY (EMERGENCY)
Facility: HOSPITAL | Age: 45
LOS: 1 days | Discharge: HOME | End: 2020-11-23
Attending: STUDENT IN AN ORGANIZED HEALTH CARE EDUCATION/TRAINING PROGRAM
Payer: MEDICAID

## 2020-11-23 VITALS
RESPIRATION RATE: 18 BRPM | HEART RATE: 68 BPM | OXYGEN SATURATION: 99 % | SYSTOLIC BLOOD PRESSURE: 145 MMHG | WEIGHT: 113.1 LBS | HEIGHT: 61 IN | TEMPERATURE: 98 F | DIASTOLIC BLOOD PRESSURE: 86 MMHG

## 2020-11-23 VITALS
TEMPERATURE: 98 F | HEART RATE: 68 BPM | DIASTOLIC BLOOD PRESSURE: 66 MMHG | SYSTOLIC BLOOD PRESSURE: 118 MMHG | RESPIRATION RATE: 18 BRPM | OXYGEN SATURATION: 100 %

## 2020-11-23 LAB
ALBUMIN SERPL ELPH-MCNC: 4.1 G/DL — SIGNIFICANT CHANGE UP (ref 3.3–5)
ALP SERPL-CCNC: 70 U/L — SIGNIFICANT CHANGE UP (ref 40–120)
ALT FLD-CCNC: 12 U/L — SIGNIFICANT CHANGE UP (ref 10–45)
ANION GAP SERPL CALC-SCNC: 10 MMOL/L — SIGNIFICANT CHANGE UP (ref 5–17)
APPEARANCE UR: CLEAR — SIGNIFICANT CHANGE UP
AST SERPL-CCNC: 25 U/L — SIGNIFICANT CHANGE UP (ref 10–40)
BASOPHILS # BLD AUTO: 0.04 K/UL — SIGNIFICANT CHANGE UP (ref 0–0.2)
BASOPHILS NFR BLD AUTO: 0.5 % — SIGNIFICANT CHANGE UP (ref 0–2)
BILIRUB SERPL-MCNC: 0.1 MG/DL — LOW (ref 0.2–1.2)
BILIRUB UR-MCNC: NEGATIVE — SIGNIFICANT CHANGE UP
BUN SERPL-MCNC: 17 MG/DL — SIGNIFICANT CHANGE UP (ref 7–23)
CALCIUM SERPL-MCNC: 10.1 MG/DL — SIGNIFICANT CHANGE UP (ref 8.4–10.5)
CHLORIDE SERPL-SCNC: 101 MMOL/L — SIGNIFICANT CHANGE UP (ref 96–108)
CO2 SERPL-SCNC: 24 MMOL/L — SIGNIFICANT CHANGE UP (ref 22–31)
COLOR SPEC: COLORLESS — SIGNIFICANT CHANGE UP
CREAT SERPL-MCNC: 0.8 MG/DL — SIGNIFICANT CHANGE UP (ref 0.5–1.3)
DIFF PNL FLD: NEGATIVE — SIGNIFICANT CHANGE UP
EOSINOPHIL # BLD AUTO: 0.4 K/UL — SIGNIFICANT CHANGE UP (ref 0–0.5)
EOSINOPHIL NFR BLD AUTO: 5.2 % — SIGNIFICANT CHANGE UP (ref 0–6)
GLUCOSE SERPL-MCNC: 92 MG/DL — SIGNIFICANT CHANGE UP (ref 70–99)
GLUCOSE UR QL: NEGATIVE — SIGNIFICANT CHANGE UP
HCT VFR BLD CALC: 40.9 % — SIGNIFICANT CHANGE UP (ref 34.5–45)
HGB BLD-MCNC: 13 G/DL — SIGNIFICANT CHANGE UP (ref 11.5–15.5)
IMM GRANULOCYTES NFR BLD AUTO: 0.4 % — SIGNIFICANT CHANGE UP (ref 0–1.5)
KETONES UR-MCNC: NEGATIVE — SIGNIFICANT CHANGE UP
LACTATE BLDV-MCNC: 1.2 MMOL/L — SIGNIFICANT CHANGE UP (ref 0.7–2)
LEUKOCYTE ESTERASE UR-ACNC: NEGATIVE — SIGNIFICANT CHANGE UP
LYMPHOCYTES # BLD AUTO: 2.32 K/UL — SIGNIFICANT CHANGE UP (ref 1–3.3)
LYMPHOCYTES # BLD AUTO: 30.1 % — SIGNIFICANT CHANGE UP (ref 13–44)
MCHC RBC-ENTMCNC: 26.7 PG — LOW (ref 27–34)
MCHC RBC-ENTMCNC: 31.8 GM/DL — LOW (ref 32–36)
MCV RBC AUTO: 84.2 FL — SIGNIFICANT CHANGE UP (ref 80–100)
MONOCYTES # BLD AUTO: 0.48 K/UL — SIGNIFICANT CHANGE UP (ref 0–0.9)
MONOCYTES NFR BLD AUTO: 6.2 % — SIGNIFICANT CHANGE UP (ref 2–14)
NEUTROPHILS # BLD AUTO: 4.45 K/UL — SIGNIFICANT CHANGE UP (ref 1.8–7.4)
NEUTROPHILS NFR BLD AUTO: 57.6 % — SIGNIFICANT CHANGE UP (ref 43–77)
NITRITE UR-MCNC: NEGATIVE — SIGNIFICANT CHANGE UP
NRBC # BLD: 0 /100 WBCS — SIGNIFICANT CHANGE UP (ref 0–0)
PH UR: 6.5 — SIGNIFICANT CHANGE UP (ref 5–8)
PLATELET # BLD AUTO: 206 K/UL — SIGNIFICANT CHANGE UP (ref 150–400)
POTASSIUM SERPL-MCNC: 4.5 MMOL/L — SIGNIFICANT CHANGE UP (ref 3.5–5.3)
POTASSIUM SERPL-SCNC: 4.5 MMOL/L — SIGNIFICANT CHANGE UP (ref 3.5–5.3)
PROT SERPL-MCNC: 7.6 G/DL — SIGNIFICANT CHANGE UP (ref 6–8.3)
PROT UR-MCNC: NEGATIVE — SIGNIFICANT CHANGE UP
RBC # BLD: 4.86 M/UL — SIGNIFICANT CHANGE UP (ref 3.8–5.2)
RBC # FLD: 12.7 % — SIGNIFICANT CHANGE UP (ref 10.3–14.5)
SODIUM SERPL-SCNC: 135 MMOL/L — SIGNIFICANT CHANGE UP (ref 135–145)
SP GR SPEC: 1 — LOW (ref 1.01–1.02)
UROBILINOGEN FLD QL: NEGATIVE — SIGNIFICANT CHANGE UP
WBC # BLD: 7.72 K/UL — SIGNIFICANT CHANGE UP (ref 3.8–10.5)
WBC # FLD AUTO: 7.72 K/UL — SIGNIFICANT CHANGE UP (ref 3.8–10.5)

## 2020-11-23 PROCEDURE — 99284 EMERGENCY DEPT VISIT MOD MDM: CPT

## 2020-11-23 PROCEDURE — 83605 ASSAY OF LACTIC ACID: CPT

## 2020-11-23 PROCEDURE — 80053 COMPREHEN METABOLIC PANEL: CPT

## 2020-11-23 PROCEDURE — 81003 URINALYSIS AUTO W/O SCOPE: CPT

## 2020-11-23 PROCEDURE — 96374 THER/PROPH/DIAG INJ IV PUSH: CPT

## 2020-11-23 PROCEDURE — 99284 EMERGENCY DEPT VISIT MOD MDM: CPT | Mod: 25

## 2020-11-23 PROCEDURE — 81025 URINE PREGNANCY TEST: CPT

## 2020-11-23 PROCEDURE — 85025 COMPLETE CBC W/AUTO DIFF WBC: CPT

## 2020-11-23 RX ORDER — KETOROLAC TROMETHAMINE 30 MG/ML
30 SYRINGE (ML) INJECTION ONCE
Refills: 0 | Status: DISCONTINUED | OUTPATIENT
Start: 2020-11-23 | End: 2020-11-23

## 2020-11-23 RX ORDER — FAMOTIDINE 10 MG/ML
20 INJECTION INTRAVENOUS ONCE
Refills: 0 | Status: COMPLETED | OUTPATIENT
Start: 2020-11-23 | End: 2020-11-23

## 2020-11-23 RX ADMIN — FAMOTIDINE 20 MILLIGRAM(S): 10 INJECTION INTRAVENOUS at 04:21

## 2020-11-23 RX ADMIN — Medication 30 MILLIGRAM(S): at 03:25

## 2020-11-23 RX ADMIN — Medication 30 MILLIGRAM(S): at 02:52

## 2020-11-23 RX ADMIN — Medication 30 MILLILITER(S): at 04:21

## 2020-11-23 NOTE — ED ADULT NURSE NOTE - OBJECTIVE STATEMENT
44 y/o F A&Ox3 presents ambulatory to ED c/o lower abdominal pain, lower back pain starting 3 days ago, with radiation to right side starting today. Patient endorses minimal pain with right lower quadrant palpation. LMP ended 11/14. Patient denies n/v/d, pyuria, dysuria, fever, chills, chest pain, SOB, HA. Patient placed in position of comfort, bed locked and in lowest position, call bell within reach. 44 y/o F A&Ox3 presents ambulatory to ED c/o lower abdominal pain, lower back pain starting 3 days ago, with radiation to right side starting today. Patient endorses minimal pain with right lower quadrant palpation. Reports taking tylenol at 0100 for pain. LMP ended 11/14. Patient denies n/v/d, pyuria, dysuria, fever, chills, chest pain, SOB, HA. Patient placed in position of comfort, bed locked and in lowest position, call bell within reach. 46 y/o F A&Ox3 presents ambulatory to ED c/o constant lower abdominal pain, lower back pain starting 3 days ago, with radiation to right side starting today. Patient endorses minimal pain with right lower quadrant palpation, denies any relieving factors. Reports taking tylenol at 0100 for pain. LMP ended 11/14. Breathing spontaneous, unlabored, breath sounds clear b/l. S1 S2 noted on cardiac auscultation. Patient denies n/v/d, pyuria, dysuria, fever, chills, loss of appetite, chest pain, SOB, HA. Patient placed in position of comfort, bed locked and in lowest position, call bell within reach. 44 y/o F A&Ox3 presents ambulatory to ED c/o constant lower abdominal pain and lower back pain starting 3 days ago. Patient reports radiation to right side starting today. Patient endorses minimal pain with right lower quadrant palpation, denies any relieving factors. Reports taking tylenol at 0100 for pain. LMP ended 11/14,  reports increased vaginal discharge, denies vaginal bleeding at this time. Patient denies n/v/d, pyuria, dysuria, hematuria. Breathing spontaneous, unlabored, breath sounds clear b/l. S1 S2 noted on cardiac auscultation. Denies fever, chills, loss of appetite, chest pain, SOB, HA. Patient placed in position of comfort, bed locked and in lowest position, call bell within reach. 46 y/o F A&Ox3 presents ambulatory to ED c/o constant lower abdominal pain and lower back pain starting 3 days ago. Patient reports intense pain to right lower quadrant starting today, however pain has since mostly subsided. Patient endorses minimal pain with right lower quadrant palpation, denies any relieving factors. Reports taking tylenol at 0100 for pain. LMP ended 11/14, reports odor to vaginal discharge last week, denies vaginal bleeding at this time. Patient denies n/v/d, pyuria, dysuria, hematuria. Breathing spontaneous, unlabored, breath sounds clear b/l. S1 S2 noted on cardiac auscultation. Denies fever, chills, loss of appetite, chest pain, SOB, HA. Patient placed in position of comfort, bed locked and in lowest position, call bell within reach. 44 y/o F A&Ox3 presents ambulatory to ED c/o constant lower abdominal pain and lower back pain starting 3 days ago. Patient reports intense pain to right lower quadrant starting today, however pain has since mostly subsided. Patient endorses minimal pain with right lower quadrant palpation, denies any relieving factors. Reports taking tylenol at 0100 for pain. LMP ended 11/14, reports odor to vaginal discharge last week, denies vaginal bleeding at this time. Patient denies CVA tenderness, n/v/d, pyuria, dysuria, hematuria. Breathing spontaneous, unlabored, breath sounds clear b/l. S1 S2 noted on cardiac auscultation. Denies fever, chills, loss of appetite, chest pain, SOB, HA. Patient placed in position of comfort, bed locked and in lowest position, call bell within reach.

## 2020-11-23 NOTE — ED PROVIDER NOTE - OBJECTIVE STATEMENT
46YO female, hx of chiari I malformation, p/w abdominal pain. onset 4d prior, suprapubic, radiating to the middle of the lumbar spine. Endorses 1d of dull RLQ pain. LMP 11/14. No hx of STDs, ovarian cysts. No recent vaginal discharge, but endorses a foul odor to physiologic discharge. denies any dysuria, flank pain, hematuria, fevers, nausea, vomiting.

## 2020-11-23 NOTE — ED PROVIDER NOTE - PATIENT PORTAL LINK FT
You can access the FollowMyHealth Patient Portal offered by [facility] by registering at the following website: [email or web address]. By joining [employer]’s FollowMyHealth portal, you will also be able to view your health information using other applications (apps) compatible with our system.

## 2020-11-23 NOTE — ED ADULT NURSE NOTE - CHPI ED NUR SYMPTOMS NEG
no abdominal distension/no hematuria/no diarrhea/no chills/no fever/no nausea/no blood in stool/no burning urination/no vomiting/no dysuria

## 2020-11-23 NOTE — ED PROVIDER NOTE - PROGRESS NOTE DETAILS
Gemma Robbins, PGY-1: pt tolerated PO challenge, ready for DC home. Imtiaz CHANEL: Pt assessed at beside. Pt resting comfortably, pain controlled, pt questions answered. Vital signs stable. On reassessment pt reports now pain improved from 7/10 to now 4/10, re exam now w/o abdominal ttp, will po chal, reassess, dispo thereafter.

## 2020-11-23 NOTE — ED PROVIDER NOTE - NSFOLLOWUPINSTRUCTIONS_ED_ALL_ED_FT
Thank you for visiting our Emergency Department, it has been a pleasure taking part in your healthcare. Please follow up with your primary doctor within x48 hours.    Your discharge diagnosis is: abdominal pain    Return precautions to the Emergency Department include but are not limited to: unrelenting nausea, vomiting, fever, chills, chest pain, shortness of breath, dizziness, abdominal pain, worsening pain, syncope, blood in urine or stool, headache that doesn't resolve, numbness or tingling, loss of sensation, loss of motor function, or any other concerning symptoms.    labs are normal   abdominal exam normal  pain control with toradol, pepcid, maalox.  at this time, we don't think there is an problem with the appendix, ovaries.

## 2020-11-23 NOTE — ED PROVIDER NOTE - ATTENDING CONTRIBUTION TO CARE
I have personally performed a face to face medical and diagnostic evaluation of the patient. I have discussed with and reviewed the Resident's note and agree with the History, ROS, Physical Exam and MDM unless otherwise indicated. A brief summary of my personal evaluation and impression can be found below.    45F hx chiari I malform presents with a cc of abdominal pain suprapubic mild diffuse dull radiation across lower abdomen to back, worse at RLQ. No fevers. Denies n/v/f/c/cp/sob. Denies headache, syncope, lightheadedness, dizziness. Denies chest palpitations, Denies edema. Denies dysuria, hematuria, BRBPR, tarry stools, diarrhea, constipation.     All other ROS negative, except as above and as per HPI and ROS section.    VITALS: Initial triage and subsequent vitals have been reviewed by me.  GEN APPEARANCE: WDWN, alert, non-toxic, NAD  HEAD: Atraumatic.  EYES: PERRLa, EOMI, vision grossly intact.   NECK: Supple  CV: RRR, S1S2, no c/r/m/g. Cap refill <2 seconds. No bruits.   LUNGS: CTAB. No abnormal breath sounds.  ABDOMEN: mild RLQ/suprapubic ttp on deep palpation only, no guarding, no rebound, no pelvic area ttp  MSK/EXT: No spinal or extremity point tenderness. No CVA ttp. Pelvis stable. No peripheral edema.  NEURO: Alert, follows commands. Weight bearing normal. Speech normal. Sensation and motor normal x4 extremities.   SKIN: Warm, dry and intact. No rash.  PSYCH: Appropriate    Plan/MDM: 45F hx Chiari I presents with a cc of periumbilical/RLQ pain, x1 day, a/w mild diffuse radiation across lower abdomen to back. Exam vss no fever, comfortable appearing, no distress, w mild ttp on deep palpation only, ddx low c/f acute appendicitis or ovarian pathology at this time given 1d, mild sx, no fever, no n/v, pt comfortable appearing, low c/f PID given no fever, low cf renal stone given comfortable, in this setting will check labs, give meds, check urine, reassess thereafter, consider ctap/tvus if not improved thereafter.

## 2020-11-23 NOTE — ED PROVIDER NOTE - PHYSICAL EXAMINATION
Gen: WDWN, NAD  HEENT: EOMI, no nasal discharge, mucous membranes moist  CV: RRR, +S1/S2, no M/R/G  Resp: CTAB, no W/R/R  GI: Abdomen soft non-distended, mild RLQ TTP, mild suprapubic TTP. no masses  MSK: No open wounds, no bruising, no LE edema  Neuro: A&Ox4, following commands, moving all four extremities spontaneously  Psych: appropriate mood  Gyn: no adnexal TTP, no masses, no CMT.

## 2020-11-23 NOTE — ED PROVIDER NOTE - CLINICAL SUMMARY MEDICAL DECISION MAKING FREE TEXT BOX
Gemma Robbins, PGY-1: 44YO female, hx of chiari I malformation, p/w abdominal pain, suprapubic and RLQ. VS stable. tolerating PO, no n/v. ddx acute cystitis, low suspicion PID, TOA, torsion, appendicitis, pyelonephritis. plan for cbc cmp ua toradol.

## 2020-11-25 NOTE — DISCUSSION/SUMMARY
[FreeTextEntry1] : 44 yo F with Chiari malformation I presented to the ED with abdominal pain. CBC, UA were negative. Low suspicion for appendicitis or PIID. Patient's symptoms resolved with supportive management. She was advised to follow up with PCP.

## 2020-12-02 ENCOUNTER — APPOINTMENT (OUTPATIENT)
Dept: INTERNAL MEDICINE | Facility: CLINIC | Age: 45
End: 2020-12-02
Payer: MEDICAID

## 2020-12-02 ENCOUNTER — OUTPATIENT (OUTPATIENT)
Dept: OUTPATIENT SERVICES | Facility: HOSPITAL | Age: 45
LOS: 1 days | End: 2020-12-02
Payer: MEDICAID

## 2020-12-02 ENCOUNTER — LABORATORY RESULT (OUTPATIENT)
Age: 45
End: 2020-12-02

## 2020-12-02 VITALS
SYSTOLIC BLOOD PRESSURE: 118 MMHG | HEART RATE: 81 BPM | BODY MASS INDEX: 22.48 KG/M2 | WEIGHT: 119 LBS | OXYGEN SATURATION: 97 % | DIASTOLIC BLOOD PRESSURE: 80 MMHG

## 2020-12-02 DIAGNOSIS — I10 ESSENTIAL (PRIMARY) HYPERTENSION: ICD-10-CM

## 2020-12-02 DIAGNOSIS — G44.209 TENSION-TYPE HEADACHE, UNSPECIFIED, NOT INTRACTABLE: ICD-10-CM

## 2020-12-02 PROCEDURE — G0463: CPT

## 2020-12-02 PROCEDURE — 99214 OFFICE O/P EST MOD 30 MIN: CPT | Mod: GC

## 2020-12-02 RX ORDER — MULTIVIT-MIN/FOLIC/VIT K/LYCOP 400-300MCG
50 MCG TABLET ORAL
Qty: 30 | Refills: 0 | Status: ACTIVE | COMMUNITY
Start: 2020-05-21

## 2020-12-02 RX ORDER — DIAPER,BRIEF,ADULT, DISPOSABLE
EACH MISCELLANEOUS
Refills: 0 | Status: ACTIVE | COMMUNITY
Start: 2018-03-28

## 2020-12-02 RX ORDER — MULTIVIT-MIN/FOLIC/VIT K/LYCOP 400-300MCG
1000 TABLET ORAL DAILY
Qty: 30 | Refills: 0 | Status: ACTIVE | COMMUNITY
Start: 2020-05-21

## 2020-12-02 NOTE — PHYSICAL EXAM
[No Acute Distress] : no acute distress [Well Nourished] : well nourished [Well Developed] : well developed [Normal Sclera/Conjunctiva] : normal sclera/conjunctiva [PERRL] : pupils equal round and reactive to light [EOMI] : extraocular movements intact [Normal Outer Ear/Nose] : the outer ears and nose were normal in appearance [Normal Oropharynx] : the oropharynx was normal [No Respiratory Distress] : no respiratory distress  [Clear to Auscultation] : lungs were clear to auscultation bilaterally [Normal Rate] : normal rate  [Regular Rhythm] : with a regular rhythm [Normal S1, S2] : normal S1 and S2 [No Edema] : there was no peripheral edema [Soft] : abdomen soft [Non Tender] : non-tender [Non-distended] : non-distended [Normal Bowel Sounds] : normal bowel sounds [Normal Gait] : normal gait [Normal Affect] : the affect was normal

## 2020-12-03 PROBLEM — G44.209 TENSION HEADACHE: Status: ACTIVE | Noted: 2019-04-02

## 2020-12-03 LAB
BASOPHILS # BLD AUTO: 0.06 K/UL
BASOPHILS NFR BLD AUTO: 0.7 %
CHOLEST SERPL-MCNC: 195 MG/DL
EOSINOPHIL # BLD AUTO: 0.49 K/UL
EOSINOPHIL NFR BLD AUTO: 5.6 %
ESTIMATED AVERAGE GLUCOSE: 105 MG/DL
HBA1C MFR BLD HPLC: 5.3 %
HCT VFR BLD CALC: 39.5 %
HDLC SERPL-MCNC: 50 MG/DL
HGB BLD-MCNC: 12.7 G/DL
IMM GRANULOCYTES NFR BLD AUTO: 0.3 %
LDLC SERPL CALC-MCNC: 121 MG/DL
LYMPHOCYTES # BLD AUTO: 2.53 K/UL
LYMPHOCYTES NFR BLD AUTO: 28.8 %
MAN DIFF?: NORMAL
MCHC RBC-ENTMCNC: 28.2 PG
MCHC RBC-ENTMCNC: 32.2 GM/DL
MCV RBC AUTO: 87.8 FL
MONOCYTES # BLD AUTO: 0.54 K/UL
MONOCYTES NFR BLD AUTO: 6.1 %
NEUTROPHILS # BLD AUTO: 5.14 K/UL
NEUTROPHILS NFR BLD AUTO: 58.5 %
NONHDLC SERPL-MCNC: 145 MG/DL
PLATELET # BLD AUTO: 235 K/UL
RBC # BLD: 4.5 M/UL
RBC # FLD: 13.1 %
TRIGL SERPL-MCNC: 119 MG/DL
WBC # FLD AUTO: 8.79 K/UL

## 2020-12-03 NOTE — ASSESSMENT
[FreeTextEntry1] : 45 year old with chiari malformation presents for CPE \par \par # Health Maintenance \par -Cbc, Cmp, lipid profile, Hemoglobin a1c , vitamin D , TSH checked \par -PHQ 2 score 0 \par \par # HA\par - Uses Midol prn for pain \par \par # Muscle Spasm\par - Uses cyclobenzaprine PRN, usually at night prior to bed \par \par # Chiari Malformation\par - Patient follows with neurology last seen in august \par - Current with no neuro deficits and infrequent HA

## 2020-12-03 NOTE — HISTORY OF PRESENT ILLNESS
[FreeTextEntry1] : CPE [de-identified] : 45 year old with Chiari malformation who presents to clinic for hospital follow up and CPE. Recently visited the ED with abdominal pain. She thought she was having a UTI however U/A was negative. She had no adnexal tenderness on pelvic in the ED. Her pain resolved completely . She is well feeling, eating well .

## 2020-12-09 DIAGNOSIS — G44.209 TENSION-TYPE HEADACHE, UNSPECIFIED, NOT INTRACTABLE: ICD-10-CM

## 2020-12-09 DIAGNOSIS — G93.5 COMPRESSION OF BRAIN: ICD-10-CM

## 2020-12-21 PROBLEM — Z01.419 ENCOUNTER FOR CERVICAL PAP SMEAR WITH PELVIC EXAM: Status: RESOLVED | Noted: 2019-03-19 | Resolved: 2020-12-21

## 2021-02-13 ENCOUNTER — RESULT REVIEW (OUTPATIENT)
Age: 46
End: 2021-02-13

## 2021-02-13 ENCOUNTER — OUTPATIENT (OUTPATIENT)
Dept: OUTPATIENT SERVICES | Facility: HOSPITAL | Age: 46
LOS: 1 days | End: 2021-02-13
Payer: MEDICAID

## 2021-02-13 ENCOUNTER — APPOINTMENT (OUTPATIENT)
Dept: MAMMOGRAPHY | Facility: IMAGING CENTER | Age: 46
End: 2021-02-13
Payer: MEDICAID

## 2021-02-13 DIAGNOSIS — Z00.8 ENCOUNTER FOR OTHER GENERAL EXAMINATION: ICD-10-CM

## 2021-02-13 PROCEDURE — 77067 SCR MAMMO BI INCL CAD: CPT | Mod: 26

## 2021-02-13 PROCEDURE — 77067 SCR MAMMO BI INCL CAD: CPT

## 2021-02-13 PROCEDURE — 77063 BREAST TOMOSYNTHESIS BI: CPT | Mod: 26

## 2021-02-13 PROCEDURE — 77063 BREAST TOMOSYNTHESIS BI: CPT

## 2021-03-12 ENCOUNTER — OUTPATIENT (OUTPATIENT)
Dept: OUTPATIENT SERVICES | Facility: HOSPITAL | Age: 46
LOS: 1 days | End: 2021-03-12
Payer: MEDICAID

## 2021-03-12 ENCOUNTER — APPOINTMENT (OUTPATIENT)
Dept: INTERNAL MEDICINE | Facility: CLINIC | Age: 46
End: 2021-03-12
Payer: MEDICAID

## 2021-03-12 DIAGNOSIS — I10 ESSENTIAL (PRIMARY) HYPERTENSION: ICD-10-CM

## 2021-03-12 PROCEDURE — G0463: CPT

## 2021-03-12 PROCEDURE — 99212 OFFICE O/P EST SF 10 MIN: CPT | Mod: GE,95

## 2021-03-15 NOTE — ASSESSMENT
[FreeTextEntry1] : 44yo female with no significant PMHx here for telehealth visit for viral like symptoms in setting of Covid + exposure.

## 2021-03-15 NOTE — PLAN
[FreeTextEntry1] : #Likely Covid-19 Infection\par -Continue supportive care, fluids, tylenol\par -Not a candidate for monoclonal Abs given age, normal BMI and no comorbidities\par -ED precautions given (SOB, CP, high spiking fevers)\par -Recommend continued self-isolation\par -F/U Covid test results\par -Task for next week's jevon to f/u with patient via telephone\par \par 14 minutes spent on visit\par D/w Dr. Juarez

## 2021-03-15 NOTE — HISTORY OF PRESENT ILLNESS
[Home] : at home, [unfilled] , at the time of the visit. [Medical Office: ([facility])___] : at the medical office located in  [Verbal consent obtained from patient] : the patient, [unfilled] [FreeTextEntry8] : 46 yo with no PMHx presents for telehealth visit with complaints of cough, chills, myalgias, anosmia and sore throat in setting of known Covid positive exposure. Symptoms began 2 days ago, Covid tested yesterday. Her Mom and Dad are confirmed positive. She has been doing supportive care at home with warms liquids, vitamin C/D, zinc and tylenol 500mg BID. She denies fevers, diarrhea and shortness of breath.

## 2021-03-15 NOTE — REVIEW OF SYSTEMS
[Chills] : chills [Sore Throat] : sore throat [Muscle Pain] : muscle pain [Headache] : headache [Fever] : no fever [Discharge] : no discharge [Vision Problems] : no vision problems [Chest Pain] : no chest pain [Palpitations] : no palpitations [Shortness Of Breath] : no shortness of breath [Abdominal Pain] : no abdominal pain [Nausea] : no nausea [Vomiting] : no vomiting [Dysuria] : no dysuria [Skin Rash] : no skin rash [Memory Loss] : no memory loss [FreeTextEntry4] : Anosmia

## 2021-03-15 NOTE — PHYSICAL EXAM
[No Acute Distress] : no acute distress [No Respiratory Distress] : no respiratory distress  [No Accessory Muscle Use] : no accessory muscle use [Normal Affect] : the affect was normal [de-identified] : Limited physical exam due to televideo visit

## 2021-03-19 DIAGNOSIS — U07.1 COVID-19: ICD-10-CM

## 2021-04-21 ENCOUNTER — OUTPATIENT (OUTPATIENT)
Dept: OUTPATIENT SERVICES | Facility: HOSPITAL | Age: 46
LOS: 1 days | End: 2021-04-21
Payer: MEDICAID

## 2021-04-21 ENCOUNTER — APPOINTMENT (OUTPATIENT)
Dept: OBGYN | Facility: CLINIC | Age: 46
End: 2021-04-21
Payer: MEDICAID

## 2021-04-21 VITALS — WEIGHT: 116 LBS | SYSTOLIC BLOOD PRESSURE: 116 MMHG | DIASTOLIC BLOOD PRESSURE: 80 MMHG | BODY MASS INDEX: 21.92 KG/M2

## 2021-04-21 VITALS — TEMPERATURE: 97.3 F

## 2021-04-21 DIAGNOSIS — N76.0 ACUTE VAGINITIS: ICD-10-CM

## 2021-04-21 DIAGNOSIS — Z00.00 ENCOUNTER FOR GENERAL ADULT MEDICAL EXAMINATION WITHOUT ABNORMAL FINDINGS: ICD-10-CM

## 2021-04-21 PROCEDURE — 99212 OFFICE O/P EST SF 10 MIN: CPT

## 2021-04-21 PROCEDURE — G0463: CPT

## 2021-04-21 NOTE — PHYSICAL EXAM
[Appropriately responsive] : appropriately responsive [Alert] : alert [No Acute Distress] : no acute distress [Soft] : soft [Non-tender] : non-tender [Non-distended] : non-distended [No Lesions] : no lesions [No Mass] : no mass [Examination Of The Breasts] : a normal appearance [No Masses] : no breast masses were palpable [Labia Majora] : normal [No Bleeding] : There was no active vaginal bleeding [Normal] : normal

## 2021-06-07 ENCOUNTER — EMERGENCY (EMERGENCY)
Facility: HOSPITAL | Age: 46
LOS: 1 days | Discharge: HOME | End: 2021-06-07
Attending: EMERGENCY MEDICINE
Payer: MEDICAID

## 2021-06-07 VITALS
OXYGEN SATURATION: 100 % | SYSTOLIC BLOOD PRESSURE: 125 MMHG | DIASTOLIC BLOOD PRESSURE: 77 MMHG | RESPIRATION RATE: 14 BRPM | TEMPERATURE: 98 F | HEART RATE: 69 BPM

## 2021-06-07 VITALS
DIASTOLIC BLOOD PRESSURE: 80 MMHG | TEMPERATURE: 98 F | HEART RATE: 78 BPM | SYSTOLIC BLOOD PRESSURE: 131 MMHG | WEIGHT: 117.95 LBS | RESPIRATION RATE: 16 BRPM | OXYGEN SATURATION: 98 % | HEIGHT: 61 IN

## 2021-06-07 PROCEDURE — 70450 CT HEAD/BRAIN W/O DYE: CPT

## 2021-06-07 PROCEDURE — 99284 EMERGENCY DEPT VISIT MOD MDM: CPT | Mod: 25

## 2021-06-07 PROCEDURE — 70450 CT HEAD/BRAIN W/O DYE: CPT | Mod: 26,MA

## 2021-06-07 PROCEDURE — 99284 EMERGENCY DEPT VISIT MOD MDM: CPT

## 2021-06-07 RX ORDER — ACETAMINOPHEN 500 MG
975 TABLET ORAL ONCE
Refills: 0 | Status: COMPLETED | OUTPATIENT
Start: 2021-06-07 | End: 2021-06-07

## 2021-06-07 RX ORDER — ONDANSETRON 8 MG/1
4 TABLET, FILM COATED ORAL ONCE
Refills: 0 | Status: COMPLETED | OUTPATIENT
Start: 2021-06-07 | End: 2021-06-07

## 2021-06-07 RX ADMIN — Medication 975 MILLIGRAM(S): at 12:01

## 2021-06-07 RX ADMIN — Medication 975 MILLIGRAM(S): at 12:03

## 2021-06-07 RX ADMIN — ONDANSETRON 4 MILLIGRAM(S): 8 TABLET, FILM COATED ORAL at 12:16

## 2021-06-07 NOTE — ED PROVIDER NOTE - PHYSICAL EXAMINATION
General: Well developed and well nourished F sitting in bed in no acute distress; proper grooming and hygiene   Psych: Anxious.  HENT: Small R occipital scalp hematoma, otherwise NCAT, no evidence of depressed skull fx; no otorrhea or rhinorrhea; no Ocbb's sign, EACs clear, no hemotympanum. neck supple FROM.  EYES: no periorbital ecchymosis. PERRL, EOMI w/o pain, no nystagmus.  Cardiac: Normal rate and rhythm, normal S1 and S2 without murmurs, gallops or rubs   Respiratory: Lungs clear to ascultation b/l without wheezes, rales or rhonchi   Abd: Soft NT.  Neuro: A&Ox3; CN2-12 grossly intact; normal and equal sensation UE and LE b/l; negative pronator drift, strength 5/5 in all 4 extremities; normal gait and gross cerebellar functioning.  MSK: Moving all extremities. No midline spinal tenderness.  Skin: No obvious rash.

## 2021-06-07 NOTE — ED PROVIDER NOTE - CARE PLAN
Principal Discharge DX:	Mild traumatic brain injury, without loss of consciousness, initial encounter

## 2021-06-07 NOTE — ED PROVIDER NOTE - PATIENT PORTAL LINK FT
You can access the FollowMyHealth Patient Portal offered by [facility] by registering at the following website: [email or web address]. By joining [facility]’s FollowMyHealth portal, you will also be able to view your health information using other applications (apps) compatible with our system.

## 2021-06-07 NOTE — ED PROVIDER NOTE - NSFOLLOWUPINSTRUCTIONS_ED_ALL_ED_FT
1) Follow up with your PCP within 24-48 hours.      Follow up in the concussion clinic    2) Rest, Take Tylenol over the counter as directed.    3) Avoid work, school, and physical activity until instructed to return by a medical provider (at least 24 hours)    4) **Return to the ER immediately if you experience:    -Inability to awaken the patient at time of expected wakening    -Severe or worsening headaches    -Prolonged sleeping or confusion    -Restlessness, unsteadiness, or seizures    -Difficulties with vision    -Vomiting, fever, or stiff neck    -Urinary or bowel incontinence    -Weakness or numbness involving any part of the body.

## 2021-06-07 NOTE — ED PROVIDER NOTE - ATTENDING CONTRIBUTION TO CARE
RGUJRAL 46yo f hx chiari malformation presents with HA s/p fall yesterday. States she was playing musical chairs and fell and hit her head. Denies LOC. Denies any neck/chest/back pain. Since then pt complains of diffuse HA, dizziness, nausea and increased fatigue. Pt took advil last night w no relief.   On exam, Patient is awake, alert x 3.  GCS15. R scalp posterior swelling, PERRL.   Neck: No Posterior midline cervical spine tenderness. Full ROM and neuro intact.  Chest is clear to auscultation. +S1S2.  Abdomen is soft nondistended/nontender +BS. No rebound or guarding. No seatbelt sign.   Pelvis is stable. Full ROM B/L hips.   Back non tender midline T/L spine.  Skin intact   DDx concussion. CT head to eval head injury, pain control.

## 2021-06-07 NOTE — ED PROVIDER NOTE - PMH
Nonintractable headache, unspecified chronicity pattern, unspecified headache type  Chiari malformation- unspecified type

## 2021-06-07 NOTE — ED PROVIDER NOTE - PROGRESS NOTE DETAILS
Pt feeling well, mild HA. images and plan d/w pt. all questions answered. pt ready and stable for DC. -Lee Sandoval PA-C

## 2021-06-07 NOTE — ED PROVIDER NOTE - OBJECTIVE STATEMENT
44 y/o F with pmhx significant for Chiari malformation, presenting s/p fall from chair height onto concrete hitting right side of her head while playing musical chairs yesterday. Denies bleeding or LOC; patient states she immediately felt a "burning headache" and took Advil and ice last night with mild relief.Ppatient now complaining of headache that has increased in severity gradually, nausea without vomiting, dizziness, photophobia, mild confusion and "inability to focus". Patient denies any other injuries, fever, chills, changes in vision, tenderness to c-spine, loss of sensation/numbness, tingling, weakness, difficulty speaking/walking/swallowing, AC use.

## 2021-06-08 ENCOUNTER — NON-APPOINTMENT (OUTPATIENT)
Age: 46
End: 2021-06-08

## 2021-06-08 PROBLEM — R51.9 HEADACHE, UNSPECIFIED: Chronic | Status: ACTIVE | Noted: 2021-06-07

## 2021-06-08 NOTE — DISCUSSION/SUMMARY
[FreeTextEntry1] : S/p fall after playing musical chairs, low concern for concussion or traumatic brain injury. To be discharged home from the ED.

## 2021-06-09 ENCOUNTER — APPOINTMENT (OUTPATIENT)
Dept: INTERNAL MEDICINE | Facility: CLINIC | Age: 46
End: 2021-06-09

## 2021-06-09 ENCOUNTER — OUTPATIENT (OUTPATIENT)
Dept: OUTPATIENT SERVICES | Facility: HOSPITAL | Age: 46
LOS: 1 days | End: 2021-06-09
Payer: MEDICAID

## 2021-06-09 DIAGNOSIS — T88.7XXA UNSPECIFIED ADVERSE EFFECT OF DRUG OR MEDICAMENT, INITIAL ENCOUNTER: ICD-10-CM

## 2021-06-09 DIAGNOSIS — N89.8 OTHER SPECIFIED NONINFLAMMATORY DISORDERS OF VAGINA: ICD-10-CM

## 2021-06-09 DIAGNOSIS — Z87.42 PERSONAL HISTORY OF OTHER DISEASES OF THE FEMALE GENITAL TRACT: ICD-10-CM

## 2021-06-09 DIAGNOSIS — Z30.09 ENCOUNTER FOR OTHER GENERAL COUNSELING AND ADVICE ON CONTRACEPTION: ICD-10-CM

## 2021-06-09 DIAGNOSIS — Z01.419 ENCOUNTER FOR GYNECOLOGICAL EXAMINATION (GENERAL) (ROUTINE) W/OUT ABNORMAL FINDINGS: ICD-10-CM

## 2021-06-09 DIAGNOSIS — Z11.3 ENCOUNTER FOR SCREENING FOR INFECTIONS WITH A PREDOMINANTLY SEXUAL MODE OF TRANSMISSION: ICD-10-CM

## 2021-06-09 DIAGNOSIS — B96.89 ACUTE VAGINITIS: ICD-10-CM

## 2021-06-09 DIAGNOSIS — U07.1 COVID-19: ICD-10-CM

## 2021-06-09 DIAGNOSIS — R10.2 PELVIC AND PERINEAL PAIN: ICD-10-CM

## 2021-06-09 DIAGNOSIS — B37.3 CANDIDIASIS OF VULVA AND VAGINA: ICD-10-CM

## 2021-06-09 DIAGNOSIS — I10 ESSENTIAL (PRIMARY) HYPERTENSION: ICD-10-CM

## 2021-06-09 DIAGNOSIS — Z09 ENCOUNTER FOR FOLLOW-UP EXAMINATION AFTER COMPLETED TREATMENT FOR CONDITIONS OTHER THAN MALIGNANT NEOPLASM: ICD-10-CM

## 2021-06-09 DIAGNOSIS — Z87.440 PERSONAL HISTORY OF URINARY (TRACT) INFECTIONS: ICD-10-CM

## 2021-06-09 DIAGNOSIS — Z11.59 ENCOUNTER FOR SCREENING FOR OTHER VIRAL DISEASES: ICD-10-CM

## 2021-06-09 DIAGNOSIS — N76.0 ACUTE VAGINITIS: ICD-10-CM

## 2021-06-09 DIAGNOSIS — Z87.09 PERSONAL HISTORY OF OTHER DISEASES OF THE RESPIRATORY SYSTEM: ICD-10-CM

## 2021-06-09 PROCEDURE — G0463: CPT

## 2021-06-11 PROBLEM — Z87.09 HISTORY OF INFLUENZA: Status: RESOLVED | Noted: 2020-01-03 | Resolved: 2020-12-23

## 2021-06-11 PROBLEM — U07.1 COVID-19 VIRUS INFECTION: Status: RESOLVED | Noted: 2021-03-15 | Resolved: 2021-06-11

## 2021-06-11 PROBLEM — Z87.440 HISTORY OF URINARY TRACT INFECTION: Status: RESOLVED | Noted: 2020-01-03 | Resolved: 2020-12-23

## 2021-06-11 PROBLEM — T88.7XXA MEDICATION SIDE EFFECT: Status: RESOLVED | Noted: 2018-04-23 | Resolved: 2021-06-11

## 2021-06-11 NOTE — PLAN
[FreeTextEntry1] : # Postconcussive syndrome\par S/p fall in which head was hit at right occipital region\par Previous [facility] ED visit on 6/7 indicating no ICH on CT Head\par Continues to experience symptoms of headache, neck pain, nausea, right eye weakness, and anxiety\par Patient reassured about symptoms.\par Start on short regimen of motrin to help with headache and swelling present in occipital region\par Avoid contact supports or any forms of exercise given acuitity of symptoms. After resting for 24 hours, can attempt light aerobic exercise. \par Neurology referral placed for follow up after this event and for follow up from her Type I Chiari malformation\par \par RTC in for follow up in-person visit after seen by neurology\par \par Case discussed with Dr. Dubois\par \par Yessy Escobedo MD, PGY1\par Internal Medicine\par

## 2021-06-11 NOTE — HISTORY OF PRESENT ILLNESS
[Home] : at home, [unfilled] , at the time of the visit. [Other Location: e.g. Home (Enter Location, City,State)___] : at [unfilled] [Verbal consent obtained from patient] : the patient, [unfilled] [FreeTextEntry1] : S/p fall, ED visit [de-identified] : 44 y/o female with pmh significant for Type I Chiari malformation (cyclobenzaprine prn) presents for a telephonic visit s/p fall and ED visit. On 6/6/21, the patient was playing with her children and participating in musical chairs. However the patient believed that she saw a chair present when it actually was not there and fell and hit her head on the concrete. She hit her right occipital region of her head, there was no loss of consciousness, however the patient experienced severe pain and a sensation of warmth, assuming that there was blood running down her neck, however no blood was present. She took Advil for relief, however due to the persistent discomfort, she went to [facility] ED on 6/7. In the ED she was given 3 doses of tylenol and zofran, and a CT scan of her head was performed, confirming her Type 1 Chiari malformation and no intracranial bleed. She was discharged from the hospital.\par Today she presents to clinic due to persistent headache, specifically in right occipital region, radiating to her right side of her neck, 8/10, throbbing sensation, and with swelling. She has been taking tylenol and cyclobenzaprine for relief, however it remains limited. This headache makes her more anxious and she admits to nausea, no episodes of emesis, and a weak sensation in her right eye. Patient denies fever, chills, chest pain, palpitations, lightheadedness, or dizziness.

## 2021-06-11 NOTE — REVIEW OF SYSTEMS
[Nausea] : nausea [Constipation] : constipation [Headache] : headache [Anxiety] : anxiety [Fever] : no fever [Chills] : no chills [Pain] : no pain [Vision Problems] : no vision problems [Chest Pain] : no chest pain [Palpitations] : no palpitations [Shortness Of Breath] : no shortness of breath [Cough] : no cough [Abdominal Pain] : no abdominal pain [Vomiting] : no vomiting [Dysuria] : no dysuria [Hematuria] : no hematuria [Frequency] : no frequency [Dizziness] : no dizziness [Fainting] : no fainting [FreeTextEntry3] : Right eye weakness

## 2021-06-18 DIAGNOSIS — F07.81 POSTCONCUSSIONAL SYNDROME: ICD-10-CM

## 2021-06-22 ENCOUNTER — APPOINTMENT (OUTPATIENT)
Dept: NEUROLOGY | Facility: CLINIC | Age: 46
End: 2021-06-22
Payer: MEDICAID

## 2021-06-22 PROCEDURE — 99215 OFFICE O/P EST HI 40 MIN: CPT | Mod: 95

## 2021-06-22 NOTE — DATA REVIEWED
[de-identified] : CT Head (4/28/19):\par - Low-lying cerebellar tonsils, 4-5 mm below level of foramen magnum\par \par CT Head ([facility] ED, 6/7/21):\par - No acute fracture or intracranial abnormalities\par - Chiari I malformation (stable) [de-identified] : MRI Brain & MRA Neck (5/15/19):\par - No acute intracranial abnormality\par - Chiari I malformation\par - No neck vessel abnormalities\par \par MRA Brain & MRI Cervical Spine (5/29/19):\par - No intracranial vessel abnormalities\par - Chiari I malformation without syrinx\par - Mild multilevel cervical spondylosis

## 2021-06-22 NOTE — ASSESSMENT
[FreeTextEntry1] : 44 RHF with history of headaches and nausea in the setting of Chiari I malformation, now with new post-traumatic headache without associated loss of consciousness.

## 2021-06-22 NOTE — HISTORY OF PRESENT ILLNESS
[FreeTextEntry1] : FROM 5/7/19:\par This is a 43-year-old right-handed woman who has had new dull headaches over the past month, radiating to the neck as a burning sensation, relieved by over-the-counter acetaminophen. On April 28, 2019, the headache became very severe, feeling like "squeezing" at both sides of the head, associated with nausea and and "weak" feeling in the eyes. She presented to the [facility] ED and had a CT Head that raised concern for low-lying cerebellar tonsils. She was treated with some pain medications and was discharged, although the same headache with nausea and "weak" eyes occurred last week. Even when she does not have a headache, she feels weak in the legs, especially when walking up and down stairs.\par \par FROM 8/19/19:\par The patient states that she since she started taking amitriptyline and PRN metoclopramide, her headaches and neck pain have decreased in frequency, but she still sometimes experiences sharp pains at the right side of her head and pressure in her neck, but this happens up to 1-2 times per week, and not every day as before. She notes that stress at home or at work tends to trigger or aggravate these aches. She finds that metoclopramide makes her tired during the day, so she does not take that often. Since the last clinic visit, she has also noticed some mild burning pain in both of her calves when she walks, but this has improved since she started taking magnesium 500mg daily.\par \par FROM 5/21/20:\par This appointment is conducted via real-time two-way audiovisual technology due to the current COVID-19 pandemic. Verbal consent for this encounter was received by the patient. The patient was located at her home address, and I was located in [place], [place]. The patient, now 44, notes that in March and April 2020, she had symptoms of lack of smell, lack of taste, cough, and fever, and thinks she and other relatives may have been infected with COVID-19, but they were not able to get tested for it. In the middle of all that, she stopped taking amitriptyline and PRN metoclopramide as back, and she has had recurrence in the past 4-5 days of posterior headache with burning sensation, neck pain, and radiation of pain to her right shoulder and right chest, described as a soreness. Even when she was taking the amitriptyline, she would experience excessive fatigue during the day after each 10mg nighttime dose, and therefore is not eager to resume it. She notes that she presented to the ED in December 2019 for lower abdominal pain and was diagnosed with urinary tract infection; she was treated with antibiotics and improved at home.\par \par FROM 8/21/20:\par Since the last clinic visit, the patient has not had severe pain, and had used cyclobenzaprine 5mg PO QHS since the last visit on 5/21/20. However, she has not needed to use it on a standing basis since then, and only uses it as needed. She recounts that she had one episode of left-sided chest discomfort over the course of one day since I last saw her, but it resolved without her seeking medical attention, and she attributes it to stress. Otherwise, she has not had any headache, neck pain, or other body pain.\par \par FROM 6/22/21:\par This appointment is conducted via real-time two-way audiovisual technology due to the current COVID-19 pandemic, and continued via telephone due to technical difficulties. Verbal consent for this encounter was received by the patient. The patient was located at her home address, and I was located in [place], [place]. The patient has been inconsistent about taking the medications I recommended, and she has not had any significant headaches for most of the time since I last saw her on 8/21/20. On 6/6/21, she was playing with family at her home and fell backwards on concrete when she thought there was a chair present, and hit the back of her head. She had pain, swelling, and burning sensation at the back of her head, but no loss of consciousness or change in vision. She presented to the [facility] ED on 6/7/21, where a CT Head showed no acute fracture or intracranial abnormality, only a stable Chiari I malformation. Since then, she has had intermittent feelings of burning at the back of her head, although the swelling from her head injury has subsided.

## 2021-06-22 NOTE — PHYSICAL EXAM
[General Appearance - In No Acute Distress] : in no acute distress [General Appearance - Alert] : alert [General Appearance - Well Nourished] : well nourished [General Appearance - Well Developed] : well developed [Impaired Insight] : insight and judgment were intact [Oriented To Time, Place, And Person] : oriented to person, place, and time [Affect] : the affect was normal [Person] : oriented to person [Place] : oriented to place [Time] : oriented to time [Concentration Intact] : normal concentrating ability [Visual Intact] : visual attention was ~T not ~L decreased [Fluency] : fluency intact [Comprehension] : comprehension intact [Cranial Nerves Optic (II)] : visual acuity intact bilaterally,  visual fields full to confrontation, pupils equal round and reactive to light [Cranial Nerves Oculomotor (III)] : extraocular motion intact [Cranial Nerves Trigeminal (V)] : facial sensation intact symmetrically [Cranial Nerves Facial (VII)] : face symmetrical [Cranial Nerves Glossopharyngeal (IX)] : tongue and palate midline [Cranial Nerves Vestibulocochlear (VIII)] : hearing was intact bilaterally [Cranial Nerves Accessory (XI - Cranial And Spinal)] : head turning and shoulder shrug symmetric [Cranial Nerves Hypoglossal (XII)] : there was no tongue deviation with protrusion [Motor Tone] : muscle tone was normal in all four extremities [Motor Strength] : muscle strength was normal in all four extremities [No Muscle Atrophy] : normal bulk in all four extremities [Motor Handedness Right-Handed] : the patient is right hand dominant [Sensation Tactile Decrease] : light touch was intact [Sensation Pain / Temperature Decrease] : pain and temperature was intact [Balance] : balance was intact [Sclera] : the sclera and conjunctiva were normal [PERRL With Normal Accommodation] : pupils were equal in size, round, reactive to light, with normal accommodation [Extraocular Movements] : extraocular movements were intact [Outer Ear] : the ears and nose were normal in appearance [Hearing Threshold Finger Rub Not Wheeler] : hearing was normal [Oropharynx] : the oropharynx was normal [Neck Appearance] : the appearance of the neck was normal [Auscultation Breath Sounds / Voice Sounds] : lungs were clear to auscultation bilaterally [Heart Rate And Rhythm] : heart rate was normal and rhythm regular [Heart Sounds] : normal S1 and S2 [Arterial Pulses Carotid] : carotid pulses were normal with no bruits [Full Pulse] : the pedal pulses are present [Abdomen Soft] : soft [Abdomen Tenderness] : non-tender [No CVA Tenderness] : no ~M costovertebral angle tenderness [No Spinal Tenderness] : no spinal tenderness [Abnormal Walk] : normal gait [Skin Color & Pigmentation] : normal skin color and pigmentation [] : no rash [Paresis Pronator Drift Right-Sided] : no pronator drift on the right [Paresis Pronator Drift Left-Sided] : no pronator drift on the left [Motor Strength Upper Extremities Bilaterally] : strength was normal in both upper extremities [Motor Strength Lower Extremities Bilaterally] : strength was normal in both lower extremities [Romberg's Sign] : Romberg's sign was negtive [Hyperesthesia] : no hyperesthesia [Past-pointing] : there was no past-pointing [Tremor] : no tremor present [Dysdiadochokinesia Bilaterally] : not present [Coordination - Dysmetria Impaired Finger-to-Nose Bilateral] : not present [Coordination - Dysmetria Impaired Heel-to-Shin Bilateral] : not present [FreeTextEntry8] : Normal, narrow-based gait. No difficulty with tiptoe, heel, and tandem gaits. [FreeTextEntry1] : Mild tenderness to palpation at posterior neck

## 2021-06-22 NOTE — REVIEW OF SYSTEMS
[As Noted in HPI] : as noted in HPI [Negative] : Endocrine [FreeTextEntry9] : Neck and right shoulder pain as in HPI

## 2021-06-28 ENCOUNTER — APPOINTMENT (OUTPATIENT)
Dept: NEUROLOGY | Facility: CLINIC | Age: 46
End: 2021-06-28
Payer: MEDICAID

## 2021-06-28 PROCEDURE — 99443: CPT

## 2021-07-21 ENCOUNTER — APPOINTMENT (OUTPATIENT)
Dept: NEUROLOGY | Facility: CLINIC | Age: 46
End: 2021-07-21
Payer: MEDICAID

## 2021-07-21 PROCEDURE — 99215 OFFICE O/P EST HI 40 MIN: CPT | Mod: 95

## 2021-08-11 RX ORDER — METOCLOPRAMIDE 5 MG/1
5 TABLET ORAL 3 TIMES DAILY
Qty: 180 | Refills: 0 | Status: DISCONTINUED | COMMUNITY
Start: 2019-05-07 | End: 2021-08-11

## 2021-08-11 NOTE — PHYSICAL EXAM
[General Appearance - Alert] : alert [General Appearance - In No Acute Distress] : in no acute distress [General Appearance - Well Nourished] : well nourished [General Appearance - Well Developed] : well developed [Oriented To Time, Place, And Person] : oriented to person, place, and time [Impaired Insight] : insight and judgment were intact [Affect] : the affect was normal [Person] : oriented to person [Place] : oriented to place [Time] : oriented to time [Concentration Intact] : normal concentrating ability [Visual Intact] : visual attention was ~T not ~L decreased [Fluency] : fluency intact [Comprehension] : comprehension intact [Cranial Nerves Optic (II)] : visual acuity intact bilaterally,  visual fields full to confrontation, pupils equal round and reactive to light [Cranial Nerves Trigeminal (V)] : facial sensation intact symmetrically [Cranial Nerves Oculomotor (III)] : extraocular motion intact [Cranial Nerves Facial (VII)] : face symmetrical [Cranial Nerves Vestibulocochlear (VIII)] : hearing was intact bilaterally [Cranial Nerves Accessory (XI - Cranial And Spinal)] : head turning and shoulder shrug symmetric [Cranial Nerves Glossopharyngeal (IX)] : tongue and palate midline [Cranial Nerves Hypoglossal (XII)] : there was no tongue deviation with protrusion [Motor Tone] : muscle tone was normal in all four extremities [Motor Strength] : muscle strength was normal in all four extremities [No Muscle Atrophy] : normal bulk in all four extremities [Motor Handedness Right-Handed] : the patient is right hand dominant [Sensation Tactile Decrease] : light touch was intact [Sensation Pain / Temperature Decrease] : pain and temperature was intact [Balance] : balance was intact [Sclera] : the sclera and conjunctiva were normal [Extraocular Movements] : extraocular movements were intact [Outer Ear] : the ears and nose were normal in appearance [Hearing Threshold Finger Rub Not Baxter] : hearing was normal [Oropharynx] : the oropharynx was normal [Neck Appearance] : the appearance of the neck was normal [Abnormal Walk] : normal gait [Skin Color & Pigmentation] : normal skin color and pigmentation [] : no rash [Paresis Pronator Drift Right-Sided] : no pronator drift on the right [Paresis Pronator Drift Left-Sided] : no pronator drift on the left [Motor Strength Upper Extremities Bilaterally] : strength was normal in both upper extremities [Motor Strength Lower Extremities Bilaterally] : strength was normal in both lower extremities [Romberg's Sign] : Romberg's sign was negtive [Hyperesthesia] : no hyperesthesia [Past-pointing] : there was no past-pointing [Tremor] : no tremor present [Dysdiadochokinesia Bilaterally] : not present [Coordination - Dysmetria Impaired Finger-to-Nose Bilateral] : not present [Coordination - Dysmetria Impaired Heel-to-Shin Bilateral] : not present [FreeTextEntry8] : Normal, narrow-based gait. No difficulty with tiptoe, heel, and tandem gaits.

## 2021-08-11 NOTE — HISTORY OF PRESENT ILLNESS
[FreeTextEntry1] : FROM 5/7/19:\par This is a 43-year-old right-handed woman who has had new dull headaches over the past month, radiating to the neck as a burning sensation, relieved by over-the-counter acetaminophen. On April 28, 2019, the headache became very severe, feeling like "squeezing" at both sides of the head, associated with nausea and and "weak" feeling in the eyes. She presented to the [facility] ED and had a CT Head that raised concern for low-lying cerebellar tonsils. She was treated with some pain medications and was discharged, although the same headache with nausea and "weak" eyes occurred last week. Even when she does not have a headache, she feels weak in the legs, especially when walking up and down stairs.\par \par FROM 8/19/19:\par The patient states that she since she started taking amitriptyline and PRN metoclopramide, her headaches and neck pain have decreased in frequency, but she still sometimes experiences sharp pains at the right side of her head and pressure in her neck, but this happens up to 1-2 times per week, and not every day as before. She notes that stress at home or at work tends to trigger or aggravate these aches. She finds that metoclopramide makes her tired during the day, so she does not take that often. Since the last clinic visit, she has also noticed some mild burning pain in both of her calves when she walks, but this has improved since she started taking magnesium 500mg daily.\par \par FROM 5/21/20:\par This appointment is conducted via real-time two-way audiovisual technology due to the current COVID-19 pandemic. Verbal consent for this encounter was received by the patient. The patient was located at her home address, and I was located in [place], [place]. The patient, now 44, notes that in March and April 2020, she had symptoms of lack of smell, lack of taste, cough, and fever, and thinks she and other relatives may have been infected with COVID-19, but they were not able to get tested for it. In the middle of all that, she stopped taking amitriptyline and PRN metoclopramide as back, and she has had recurrence in the past 4-5 days of posterior headache with burning sensation, neck pain, and radiation of pain to her right shoulder and right chest, described as a soreness. Even when she was taking the amitriptyline, she would experience excessive fatigue during the day after each 10mg nighttime dose, and therefore is not eager to resume it. She notes that she presented to the ED in December 2019 for lower abdominal pain and was diagnosed with urinary tract infection; she was treated with antibiotics and improved at home.\par \par FROM 8/21/20:\par Since the last clinic visit, the patient has not had severe pain, and had used cyclobenzaprine 5mg PO QHS since the last visit on 5/21/20. However, she has not needed to use it on a standing basis since then, and only uses it as needed. She recounts that she had one episode of left-sided chest discomfort over the course of one day since I last saw her, but it resolved without her seeking medical attention, and she attributes it to stress. Otherwise, she has not had any headache, neck pain, or other body pain.\par \par FROM 6/22/21:\par This appointment is conducted via real-time two-way audiovisual technology due to the current COVID-19 pandemic, and continued via telephone due to technical difficulties. Verbal consent for this encounter was received by the patient. The patient was located at her home address, and I was located in [place], [place]. The patient has been inconsistent about taking the medications I recommended, and she has not had any significant headaches for most of the time since I last saw her on 8/21/20. On 6/6/21, she was playing with family at her home and fell backwards on concrete when she thought there was a chair present, and hit the back of her head. She had pain, swelling, and burning sensation at the back of her head, but no loss of consciousness or change in vision. She presented to the [facility] ED on 6/7/21, where a CT Head showed no acute fracture or intracranial abnormality, only a stable Chiari I malformation. Since then, she has had intermittent feelings of burning at the back of her head, although the swelling from her head injury has subsided.\par \par FROM 7/21/21:\par This appointment is conducted via real-time two-way audiovisual technology due to the current COVID-19 pandemic. Verbal consent for this encounter was received by the patient. The patient was located at her home address, and I was located in [place], [place]. Since our last appointment on 6/22/21, the patient has completed a methylprednisolone dose pack and has started amitriptyline 10mg PO QHS, although she has not further titrated this to 30mg PO QHS. She has also used ibuprofen 600mg as needed for breakthrough headaches. She has had improvement in the frequency and intensity of headaches and posterior head burning sensations since then.

## 2021-08-11 NOTE — ASSESSMENT
[FreeTextEntry1] : 44 RHF with history of headaches and nausea in the setting of Chiari I malformation, with exacerbation of symptoms in the setting of post-traumatic headache, now better controlled on amitriptyline.

## 2021-08-11 NOTE — DATA REVIEWED
[FreeTextEntry1] : MRI Brain & MRA Neck (5/15/19):\par - No acute intracranial abnormality\par - Chiari I malformation\par - No neck vessel abnormalities\par \par MRA Brain & MRI Cervical Spine (5/29/19):\par - No intracranial vessel abnormalities\par - Chiari I malformation without syrinx\par - Mild multilevel cervical spondylosis\par \par CT Head (4/28/19):\par - Low-lying cerebellar tonsils, 4-5 mm below level of foramen magnum\par \par CT Head ([facility] ED, 6/7/21):\par - No acute fracture or intracranial abnormalities\par - Chiari I malformation (stable)

## 2021-09-02 ENCOUNTER — APPOINTMENT (OUTPATIENT)
Dept: INTERNAL MEDICINE | Facility: CLINIC | Age: 46
End: 2021-09-02
Payer: MEDICAID

## 2021-09-02 ENCOUNTER — OUTPATIENT (OUTPATIENT)
Dept: OUTPATIENT SERVICES | Facility: HOSPITAL | Age: 46
LOS: 1 days | End: 2021-09-02
Payer: MEDICAID

## 2021-09-02 ENCOUNTER — NON-APPOINTMENT (OUTPATIENT)
Age: 46
End: 2021-09-02

## 2021-09-02 VITALS
HEIGHT: 61 IN | DIASTOLIC BLOOD PRESSURE: 82 MMHG | WEIGHT: 115 LBS | HEART RATE: 78 BPM | OXYGEN SATURATION: 99 % | BODY MASS INDEX: 21.71 KG/M2 | SYSTOLIC BLOOD PRESSURE: 126 MMHG

## 2021-09-02 DIAGNOSIS — I10 ESSENTIAL (PRIMARY) HYPERTENSION: ICD-10-CM

## 2021-09-02 PROCEDURE — 99213 OFFICE O/P EST LOW 20 MIN: CPT | Mod: GE

## 2021-09-02 PROCEDURE — G0463: CPT

## 2021-09-02 RX ORDER — WHEAT DEXTRIN 3 G/4 G
POWDER (GRAM) ORAL DAILY
Qty: 1 | Refills: 0 | Status: COMPLETED | COMMUNITY
Start: 2020-05-21 | End: 2021-09-02

## 2021-09-02 RX ORDER — CYCLOBENZAPRINE HYDROCHLORIDE 5 MG/1
5 TABLET, FILM COATED ORAL DAILY
Qty: 90 | Refills: 0 | Status: COMPLETED | COMMUNITY
Start: 2020-05-21 | End: 2021-09-02

## 2021-09-03 NOTE — HISTORY OF PRESENT ILLNESS
[FreeTextEntry8] : CC: Left nipple discharge for 2 days. \par \par 44yo  F with MHx of Type 1 Chiari malformation who presented to the clinic with a complain of new-onset L nipple discharge for 2 days. The patient reported that two days ago she felt that her breast were swollen and on self breast exam she noticed b/l "rust" color nipple discharge associated with a burning sensation, worse on the left. She reported that this is the first time that this has happened. Her last mammogram was performed on 2021 and it was normal. Otherwise the patient reported feeling well. Denied recent illness, n/v/d, skin changes in the breast. There is extensive family history for cancer, none for breast cancer that she knows of.

## 2021-09-03 NOTE — ASSESSMENT
[FreeTextEntry1] : 46yo  F with MHx of Type 1 Chiari malformation who presented to the clinic with a complain of new-onset L nipple discharge for 2 days.

## 2021-09-03 NOTE — PHYSICAL EXAM
[No Masses] : no palpable masses [No Axillary Lymphadenopathy] : no axillary lymphadenopathy [Normal Axillary Nodes] : no axillary lymphadenopathy [Normal] : affect was normal and insight and judgment were intact [de-identified] : brown nipple discharge bilaterally, no skin changes, no nipple retractions, no masses or lumps

## 2021-09-07 DIAGNOSIS — N64.52 NIPPLE DISCHARGE: ICD-10-CM

## 2021-09-13 ENCOUNTER — OUTPATIENT (OUTPATIENT)
Dept: OUTPATIENT SERVICES | Facility: HOSPITAL | Age: 46
LOS: 1 days | End: 2021-09-13
Payer: MEDICAID

## 2021-09-13 ENCOUNTER — RESULT REVIEW (OUTPATIENT)
Age: 46
End: 2021-09-13

## 2021-09-13 ENCOUNTER — APPOINTMENT (OUTPATIENT)
Dept: ULTRASOUND IMAGING | Facility: IMAGING CENTER | Age: 46
End: 2021-09-13
Payer: MEDICAID

## 2021-09-13 ENCOUNTER — APPOINTMENT (OUTPATIENT)
Dept: MAMMOGRAPHY | Facility: IMAGING CENTER | Age: 46
End: 2021-09-13
Payer: MEDICAID

## 2021-09-13 DIAGNOSIS — N64.52 NIPPLE DISCHARGE: ICD-10-CM

## 2021-09-13 DIAGNOSIS — Z00.8 ENCOUNTER FOR OTHER GENERAL EXAMINATION: ICD-10-CM

## 2021-09-13 PROCEDURE — G0279: CPT

## 2021-09-13 PROCEDURE — 77062 BREAST TOMOSYNTHESIS BI: CPT | Mod: 26

## 2021-09-13 PROCEDURE — 77066 DX MAMMO INCL CAD BI: CPT | Mod: 26

## 2021-09-13 PROCEDURE — 76641 ULTRASOUND BREAST COMPLETE: CPT

## 2021-09-13 PROCEDURE — 76641 ULTRASOUND BREAST COMPLETE: CPT | Mod: 26,50

## 2021-09-13 PROCEDURE — 77066 DX MAMMO INCL CAD BI: CPT

## 2021-09-14 ENCOUNTER — NON-APPOINTMENT (OUTPATIENT)
Age: 46
End: 2021-09-14

## 2021-09-15 ENCOUNTER — RX RENEWAL (OUTPATIENT)
Age: 46
End: 2021-09-15

## 2021-09-16 ENCOUNTER — NON-APPOINTMENT (OUTPATIENT)
Age: 46
End: 2021-09-16

## 2021-09-16 ENCOUNTER — LABORATORY RESULT (OUTPATIENT)
Age: 46
End: 2021-09-16

## 2021-09-16 ENCOUNTER — APPOINTMENT (OUTPATIENT)
Dept: INTERNAL MEDICINE | Facility: CLINIC | Age: 46
End: 2021-09-16
Payer: MEDICAID

## 2021-09-16 ENCOUNTER — OUTPATIENT (OUTPATIENT)
Dept: OUTPATIENT SERVICES | Facility: HOSPITAL | Age: 46
LOS: 1 days | End: 2021-09-16
Payer: MEDICAID

## 2021-09-16 VITALS
HEART RATE: 74 BPM | SYSTOLIC BLOOD PRESSURE: 120 MMHG | BODY MASS INDEX: 21.9 KG/M2 | HEIGHT: 61 IN | DIASTOLIC BLOOD PRESSURE: 80 MMHG | WEIGHT: 116 LBS | OXYGEN SATURATION: 98 %

## 2021-09-16 DIAGNOSIS — I10 ESSENTIAL (PRIMARY) HYPERTENSION: ICD-10-CM

## 2021-09-16 PROCEDURE — 84146 ASSAY OF PROLACTIN: CPT

## 2021-09-16 PROCEDURE — G0463: CPT

## 2021-09-16 PROCEDURE — 84443 ASSAY THYROID STIM HORMONE: CPT

## 2021-09-16 PROCEDURE — 85027 COMPLETE CBC AUTOMATED: CPT

## 2021-09-16 PROCEDURE — 99213 OFFICE O/P EST LOW 20 MIN: CPT | Mod: GE

## 2021-09-16 RX ORDER — IBUPROFEN 600 MG/1
600 TABLET, FILM COATED ORAL
Qty: 20 | Refills: 0 | Status: DISCONTINUED | COMMUNITY
Start: 2021-05-04 | End: 2021-09-16

## 2021-09-16 RX ORDER — ACETAMINOPHEN 500 MG/1
500 TABLET ORAL
Qty: 30 | Refills: 0 | Status: DISCONTINUED | COMMUNITY
Start: 2019-04-29 | End: 2021-09-16

## 2021-09-16 RX ORDER — IBUPROFEN 600 MG/1
600 TABLET, FILM COATED ORAL 3 TIMES DAILY
Qty: 90 | Refills: 3 | Status: DISCONTINUED | COMMUNITY
Start: 2021-06-28 | End: 2021-09-16

## 2021-09-17 ENCOUNTER — NON-APPOINTMENT (OUTPATIENT)
Age: 46
End: 2021-09-17

## 2021-09-17 LAB
HCT VFR BLD CALC: 39.5 % — SIGNIFICANT CHANGE UP (ref 34.5–45)
HGB BLD-MCNC: 12.6 G/DL — SIGNIFICANT CHANGE UP (ref 11.5–15.5)
MCHC RBC-ENTMCNC: 28.2 PG — SIGNIFICANT CHANGE UP (ref 27–34)
MCHC RBC-ENTMCNC: 31.9 GM/DL — LOW (ref 32–36)
MCV RBC AUTO: 88.4 FL — SIGNIFICANT CHANGE UP (ref 80–100)
PLATELET # BLD AUTO: 233 K/UL — SIGNIFICANT CHANGE UP (ref 150–400)
PROLACTIN SERPL-MCNC: 10.6 NG/ML — SIGNIFICANT CHANGE UP (ref 3.4–24.1)
RBC # BLD: 4.47 M/UL — SIGNIFICANT CHANGE UP (ref 3.8–5.2)
RBC # FLD: 13.9 % — SIGNIFICANT CHANGE UP (ref 10.3–14.5)
T4 FREE+ TSH PNL SERPL: 1.11 UIU/ML — SIGNIFICANT CHANGE UP (ref 0.27–4.2)
WBC # BLD: 8.26 K/UL — SIGNIFICANT CHANGE UP (ref 3.8–10.5)
WBC # FLD AUTO: 8.26 K/UL — SIGNIFICANT CHANGE UP (ref 3.8–10.5)

## 2021-09-17 NOTE — PHYSICAL EXAM
[Normal] : no acute distress, well nourished, well developed and well-appearing [Normal Sclera/Conjunctiva] : normal sclera/conjunctiva [EOMI] : extraocular movements intact [Normal Outer Ear/Nose] : the outer ears and nose were normal in appearance [Normal Oropharynx] : the oropharynx was normal [No Lymphadenopathy] : no lymphadenopathy [Supple] : supple [Thyroid Normal, No Nodules] : the thyroid was normal and there were no nodules present [No Respiratory Distress] : no respiratory distress  [No Accessory Muscle Use] : no accessory muscle use [Clear to Auscultation] : lungs were clear to auscultation bilaterally [Normal Rate] : normal rate  [Regular Rhythm] : with a regular rhythm [Normal S1, S2] : normal S1 and S2 [No Edema] : there was no peripheral edema [No Extremity Clubbing/Cyanosis] : no extremity clubbing/cyanosis [Soft] : abdomen soft [Non Tender] : non-tender [Non-distended] : non-distended [Normal Axillary Nodes] : no axillary lymphadenopathy [Normal Posterior Cervical Nodes] : no posterior cervical lymphadenopathy [Normal Anterior Cervical Nodes] : no anterior cervical lymphadenopathy [No CVA Tenderness] : no CVA  tenderness [No Spinal Tenderness] : no spinal tenderness [No Joint Swelling] : no joint swelling [Grossly Normal Strength/Tone] : grossly normal strength/tone [No Rash] : no rash [No Skin Lesions] : no skin lesions [Coordination Grossly Intact] : coordination grossly intact [No Focal Deficits] : no focal deficits [Normal Gait] : normal gait [Normal Affect] : the affect was normal [Alert and Oriented x3] : oriented to person, place, and time [Normal Insight/Judgement] : insight and judgment were intact [de-identified] : Soft breast overall. No erythema, induration, fluctuance, warmth or big lumps on b/l breasts, no overt LAD palpated. Mild tenderness diffusely on palpation. (+) clear/dark-colored nipple discharge b/l with manual expression, non-bloody, nonpurulent.

## 2021-09-17 NOTE — HISTORY OF PRESENT ILLNESS
[FreeTextEntry8] : 45F , type 1 chiari malformation with chronic headache, presented for acute visit of breast pain and nipple discharge for > 2 weeks. \par \par Patient reports experiencing b/l breast pain for > 1 month, L>R, associated with breast enlargement that was not changing with her menstrual cycle (LMP around ). Also noticed to have "júnior" but not milky or bloody nipple discharge on manual expression for about 2 weeks. No overlying skin changes, erythema, induration, or wounds. No recalled trauma, skin damage to the area. No change in body wash, detergent, food intake or physical contacts. Restarted on amitriptyline for headache around July. No other medications. \par Had b/l mammo and US, which showed cysts b/l breasts, but was told "it was fine". Was given information for breast clinic, but has not made appt yet. Felt warm intermittently, but no measured fever. Experiencing some pain in the chest area, but not exertional and no radiation to other regions. \par ROS otherwise negative.

## 2021-09-17 NOTE — ASSESSMENT
[FreeTextEntry1] : 45F , type 1 chiari malformation with chronic headache, presented for acute visit of breast pain and nipple discharge for > 2 weeks.\par \par \par RTC if worsening symptoms\par \par Case d/w Dr. Trimble\par \par Janel Tan, PGY-3

## 2021-09-17 NOTE — REVIEW OF SYSTEMS
[Negative] : Heme/Lymph [FreeTextEntry2] : Subjective warmth, no measured fever [FreeTextEntry1] : Breast symptoms see HPI

## 2021-09-20 DIAGNOSIS — N64.52 NIPPLE DISCHARGE: ICD-10-CM

## 2021-09-27 PROBLEM — N64.52 NIPPLE DISCHARGE: Status: ACTIVE | Noted: 2021-09-02

## 2021-09-29 ENCOUNTER — APPOINTMENT (OUTPATIENT)
Dept: SURGICAL ONCOLOGY | Facility: CLINIC | Age: 46
End: 2021-09-29
Payer: MEDICAID

## 2021-09-29 VITALS
WEIGHT: 116 LBS | OXYGEN SATURATION: 99 % | DIASTOLIC BLOOD PRESSURE: 79 MMHG | HEIGHT: 61 IN | TEMPERATURE: 98 F | SYSTOLIC BLOOD PRESSURE: 125 MMHG | BODY MASS INDEX: 21.9 KG/M2 | HEART RATE: 71 BPM

## 2021-09-29 DIAGNOSIS — N64.52 NIPPLE DISCHARGE: ICD-10-CM

## 2021-09-29 PROCEDURE — 99204 OFFICE O/P NEW MOD 45 MIN: CPT

## 2021-09-29 PROCEDURE — 99244 OFF/OP CNSLTJ NEW/EST MOD 40: CPT

## 2021-09-29 NOTE — CONSULT LETTER
[Dear  ___] : Dear  [unfilled], [FreeTextEntry2] : Janel Tan MD [FreeTextEntry1] : 45 year female presents for an initial consultation.   She recently saw Dr. Janel Tan with complaints of bilateral breast pain and nipple discharge for 2 weeks.  Today she reports onset of bilateral breast pain (left>right) in the second week of August.  She has a sensation of warmth and fullness in both breasts.  She feels like she has fever at times but when she takes her temperature it is normal.  She examined her breasts and when she put pressure she noticed bilateral júnior colored nipple discharge.  Her symptoms do not change with her menstrual cycles.  She denies any visible swelling or redness of either breast.\par \par She completed a bilateral mammogram and sonogram on 21 which demonstrated benign findings and failed to elucidate the source of nipple discharge (BIRADS 2).\par \par She has no prior issues related to her breasts.  She last  in . Her past medical history includes a Chiari malformation with chronic headaches.  Past surgical history includes 3  sections.  She has a family history of breast cancer involving a maternal aunt at age 65 and colon cancer involving her mother at age 55.  She denies tobacco use and drinks one alcoholic drink per week.  She had a baseline colonoscopy in . \par \par A&P:\par -45 year-old female with non spontaneous júnior colored bilateral nipple discharge- guaiac negative in the office today\par -Benign mammogram/sonogram 2021 and no suspicious findings on exam\par -Will get genetic testing due to family history today\par -Will refer for breast MRI to rule out any occult lesions\par -Advised patient on methods to reduce breast pain and reassured her that breast pain is typically benign in nature\par - I have reviewed the pertinent imaging, blood work and pathology. \par \par Referring MD/PCP: Dr. Janel Tan

## 2021-09-29 NOTE — HISTORY OF PRESENT ILLNESS
[de-identified] : 45 year female presents for an initial consultation.   She recently saw Dr. Janel Tan with complaints of bilateral breast pain and nipple discharge for 2 weeks.  Today she reports onset of bilateral breast pain (left>right) in the second week of August.  She has a sensation of warmth and fullness in both breasts.  She feels like she has fever at times but when she takes her temperature it is normal.  She examined her breasts and when she put pressure she noticed bilateral júnior colored nipple discharge.  Her symptoms do not change with her menstrual cycles.  She denies any visible swelling or redness of either breast.\par \par She completed a bilateral mammogram and sonogram on 21 which demonstrated benign findings and failed to elucidate the source of nipple discharge (BIRADS 2).\par \par She has no prior issues related to her breasts.  She last  in . Her past medical history includes a Chiari malformation with chronic headaches.  Past surgical history includes 3  sections.  She has a family history of breast cancer involving a maternal aunt at age 65 and colon cancer involving her mother at age 55.  She denies tobacco use and drinks one alcoholic drink per week.  She had a baseline colonoscopy in . \par \par Referring MD/PCP: Dr. Janel Tan\par

## 2021-09-29 NOTE — ASSESSMENT
[FreeTextEntry1] : 45 year female presents for an initial consultation.   She recently saw Dr. Janel Tan with complaints of bilateral breast pain and nipple discharge for 2 weeks.  Today she reports onset of bilateral breast pain (left>right) in the second week of August.  She has a sensation of warmth and fullness in both breasts.  She feels like she has fever at times but when she takes her temperature it is normal.  She examined her breasts and when she put pressure she noticed bilateral júnior colored nipple discharge.  Her symptoms do not change with her menstrual cycles.  She denies any visible swelling or redness of either breast.\par \par She completed a bilateral mammogram and sonogram on 21 which demonstrated benign findings and failed to elucidate the source of nipple discharge (BIRADS 2).\par \par She has no prior issues related to her breasts.  She last  in . Her past medical history includes a Chiari malformation with chronic headaches.  Past surgical history includes 3  sections.  She has a family history of breast cancer involving a maternal aunt at age 65 and colon cancer involving her mother at age 55.  She denies tobacco use and drinks one alcoholic drink per week.  She had a baseline colonoscopy in . \par \par A&P:\par -45 year-old female with non spontaneous júnior colored bilateral nipple discharge- guaiac negative in the office today\par -Benign mammogram/sonogram 2021 and no suspicious findings on exam\par -Will get genetic testing due to family history today\par -Will refer for breast MRI to rule out any occult lesions\par -Advised patient on methods to reduce breast pain and reassured her that breast pain is typically benign in nature\par - I have reviewed the pertinent imaging, blood work and pathology.

## 2021-09-29 NOTE — PHYSICAL EXAM
[Normal] : supple, no neck mass and thyroid not enlarged [Normal Supraclavicular Lymph Nodes] : normal supraclavicular lymph nodes [Normal Axillary Lymph Nodes] : normal axillary lymph nodes [Normal] : oriented to person, place and time, with appropriate affect [FreeTextEntry1] : AB present during exam  [de-identified] : Bilateral non spontaneous rust colored nipple discharge- tested both breasts for occult blood, both negative.  No palpable mass,  nipple inversion or skin change

## 2021-10-14 ENCOUNTER — APPOINTMENT (OUTPATIENT)
Dept: NEUROLOGY | Facility: CLINIC | Age: 46
End: 2021-10-14
Payer: MEDICAID

## 2021-10-14 PROCEDURE — 99443: CPT

## 2021-10-21 ENCOUNTER — NON-APPOINTMENT (OUTPATIENT)
Age: 46
End: 2021-10-21

## 2021-10-27 ENCOUNTER — APPOINTMENT (OUTPATIENT)
Dept: INTERNAL MEDICINE | Facility: CLINIC | Age: 46
End: 2021-10-27
Payer: MEDICAID

## 2021-10-27 ENCOUNTER — OUTPATIENT (OUTPATIENT)
Dept: OUTPATIENT SERVICES | Facility: HOSPITAL | Age: 46
LOS: 1 days | End: 2021-10-27
Payer: MEDICAID

## 2021-10-27 DIAGNOSIS — Z23 ENCOUNTER FOR IMMUNIZATION: ICD-10-CM

## 2021-10-27 PROCEDURE — 90686 IIV4 VACC NO PRSV 0.5 ML IM: CPT

## 2021-10-27 PROCEDURE — 90656 IIV3 VACC NO PRSV 0.5 ML IM: CPT

## 2021-10-27 PROCEDURE — G0008: CPT

## 2021-10-28 DIAGNOSIS — N64.52 NIPPLE DISCHARGE: ICD-10-CM

## 2021-10-28 DIAGNOSIS — Z23 ENCOUNTER FOR IMMUNIZATION: ICD-10-CM

## 2021-11-05 ENCOUNTER — APPOINTMENT (OUTPATIENT)
Dept: NEUROLOGY | Facility: CLINIC | Age: 46
End: 2021-11-05
Payer: MEDICAID

## 2021-11-05 PROCEDURE — 99215 OFFICE O/P EST HI 40 MIN: CPT | Mod: 95

## 2021-11-05 NOTE — PHYSICAL EXAM
[General Appearance - Alert] : alert [General Appearance - In No Acute Distress] : in no acute distress [General Appearance - Well Nourished] : well nourished [General Appearance - Well Developed] : well developed [Oriented To Time, Place, And Person] : oriented to person, place, and time [Impaired Insight] : insight and judgment were intact [Affect] : the affect was normal [Person] : oriented to person [Place] : oriented to place [Time] : oriented to time [Concentration Intact] : normal concentrating ability [Visual Intact] : visual attention was ~T not ~L decreased [Fluency] : fluency intact [Comprehension] : comprehension intact [Cranial Nerves Optic (II)] : visual acuity intact bilaterally,  visual fields full to confrontation, pupils equal round and reactive to light [Cranial Nerves Oculomotor (III)] : extraocular motion intact [Cranial Nerves Trigeminal (V)] : facial sensation intact symmetrically [Cranial Nerves Facial (VII)] : face symmetrical [Cranial Nerves Glossopharyngeal (IX)] : tongue and palate midline [Cranial Nerves Vestibulocochlear (VIII)] : hearing was intact bilaterally [Cranial Nerves Accessory (XI - Cranial And Spinal)] : head turning and shoulder shrug symmetric [Cranial Nerves Hypoglossal (XII)] : there was no tongue deviation with protrusion [Motor Tone] : muscle tone was normal in all four extremities [Motor Strength] : muscle strength was normal in all four extremities [No Muscle Atrophy] : normal bulk in all four extremities [Motor Handedness Right-Handed] : the patient is right hand dominant [Sensation Tactile Decrease] : light touch was intact [Sensation Pain / Temperature Decrease] : pain and temperature was intact [Abnormal Walk] : normal gait [Balance] : balance was intact [Sclera] : the sclera and conjunctiva were normal [Extraocular Movements] : extraocular movements were intact [Outer Ear] : the ears and nose were normal in appearance [Oropharynx] : the oropharynx was normal [Neck Appearance] : the appearance of the neck was normal [Skin Color & Pigmentation] : normal skin color and pigmentation [] : no rash [Hearing Threshold Finger Rub Not [place]] : hearing was normal [Nail Clubbing] : no clubbing  or cyanosis of the fingernails [Paresis Pronator Drift Right-Sided] : no pronator drift on the right [Paresis Pronator Drift Left-Sided] : no pronator drift on the left [Motor Strength Upper Extremities Bilaterally] : strength was normal in both upper extremities [Motor Strength Lower Extremities Bilaterally] : strength was normal in both lower extremities [Hyperesthesia] : no hyperesthesia [Past-pointing] : there was no past-pointing [Tremor] : no tremor present [Dysdiadochokinesia Bilaterally] : not present [Coordination - Dysmetria Impaired Finger-to-Nose Bilateral] : not present [Coordination - Dysmetria Impaired Heel-to-Shin Bilateral] : not present [FreeTextEntry8] : Gait and Romberg sign exams deferred by patient.

## 2021-11-05 NOTE — ASSESSMENT
[FreeTextEntry1] : 45 RHF with history of headaches, nausea, and paresthesias in the setting of Chiari I malformation, with exacerbation of symptoms in the setting of post-traumatic headache, now better controlled on low-dose amitriptyline and recent methylprednisolone dose pack.

## 2021-11-05 NOTE — DATA REVIEWED
[FreeTextEntry1] : MRI Brain & MRA Neck (5/15/19):\par - No acute intracranial abnormality\par - Chiari I malformation\par - No neck vessel abnormalities\par \par MRA Brain & MRI Cervical Spine (5/29/19):\par - No intracranial vessel abnormalities\par - Chiari I malformation without syrinx\par - Mild multilevel cervical spondylosis\par \par CT Head (4/28/19):\par - Low-lying cerebellar tonsils, 4-5 mm below level of foramen magnum\par \par CT Head ([facility] ED, 6/7/21):\par - No acute fracture or intracranial abnormalities\par - Chiari I malformation (stable)

## 2021-11-05 NOTE — HISTORY OF PRESENT ILLNESS
[FreeTextEntry1] : FROM 5/7/19:\par This is a 43-year-old right-handed woman who has had new dull headaches over the past month, radiating to the neck as a burning sensation, relieved by over-the-counter acetaminophen. On April 28, 2019, the headache became very severe, feeling like "squeezing" at both sides of the head, associated with nausea and and "weak" feeling in the eyes. She presented to the [facility] ED and had a CT Head that raised concern for low-lying cerebellar tonsils. She was treated with some pain medications and was discharged, although the same headache with nausea and "weak" eyes occurred last week. Even when she does not have a headache, she feels weak in the legs, especially when walking up and down stairs.\par \par FROM 8/19/19:\par The patient states that she since she started taking amitriptyline and PRN metoclopramide, her headaches and neck pain have decreased in frequency, but she still sometimes experiences sharp pains at the right side of her head and pressure in her neck, but this happens up to 1-2 times per week, and not every day as before. She notes that stress at home or at work tends to trigger or aggravate these aches. She finds that metoclopramide makes her tired during the day, so she does not take that often. Since the last clinic visit, she has also noticed some mild burning pain in both of her calves when she walks, but this has improved since she started taking magnesium 500mg daily.\par \par FROM 5/21/20:\par This appointment is conducted via real-time two-way audiovisual technology due to the current COVID-19 pandemic. Verbal consent for this encounter was received by the patient. The patient was located at her home address, and I was located in [place], [place]. The patient, now 44, notes that in March and April 2020, she had symptoms of lack of smell, lack of taste, cough, and fever, and thinks she and other relatives may have been infected with COVID-19, but they were not able to get tested for it. In the middle of all that, she stopped taking amitriptyline and PRN metoclopramide as back, and she has had recurrence in the past 4-5 days of posterior headache with burning sensation, neck pain, and radiation of pain to her right shoulder and right chest, described as a soreness. Even when she was taking the amitriptyline, she would experience excessive fatigue during the day after each 10mg nighttime dose, and therefore is not eager to resume it. She notes that she presented to the ED in December 2019 for lower abdominal pain and was diagnosed with urinary tract infection; she was treated with antibiotics and improved at home.\par \par FROM 8/21/20:\par Since the last clinic visit, the patient has not had severe pain, and had used cyclobenzaprine 5mg PO QHS since the last visit on 5/21/20. However, she has not needed to use it on a standing basis since then, and only uses it as needed. She recounts that she had one episode of left-sided chest discomfort over the course of one day since I last saw her, but it resolved without her seeking medical attention, and she attributes it to stress. Otherwise, she has not had any headache, neck pain, or other body pain.\par \par FROM 6/22/21:\par This appointment is conducted via real-time two-way audiovisual technology due to the current COVID-19 pandemic, and continued via telephone due to technical difficulties. Verbal consent for this encounter was received by the patient. The patient, now 45, was located at her home address, and I was located in [place], [place]. The patient has been inconsistent about taking the medications I recommended, and she has not had any significant headaches for most of the time since I last saw her on 8/21/20. On 6/6/21, she was playing with family at her home and fell backwards on concrete when she thought there was a chair present, and hit the back of her head. She had pain, swelling, and burning sensation at the back of her head, but no loss of consciousness or change in vision. She presented to the [facility] ED on 6/7/21, where a CT Head showed no acute fracture or intracranial abnormality, only a stable Chiari I malformation. Since then, she has had intermittent feelings of burning at the back of her head, although the swelling from her head injury has subsided.\par \par FROM 7/21/21:\par This appointment is conducted via real-time two-way audiovisual technology due to the current COVID-19 pandemic. Verbal consent for this encounter was received by the patient. The patient was located at her home address, and I was located in [place], [place]. Since our last appointment on 6/22/21, the patient has completed a methylprednisolone dose pack and has started amitriptyline 10mg PO QHS, although she has not further titrated this to 30mg PO QHS. She has also used ibuprofen 600mg as needed for breakthrough headaches. She has had improvement in the frequency and intensity of headaches and posterior head burning sensations since then.\par \par FROM 11/5/21:\par This appointment is conducted via real-time two-way audiovisual technology due to the current COVID-19 pandemic. Verbal consent for this encounter was received by the patient. The patient was located at the home address, and I was located in [place], [place]. Since the last appointment on 7/21/21, I spoke with the patient by telephone on 10/14/21, when she reported having a flare-up of posterior headaches with burning sensations occurring 3 times per week. At that point, she was taking amitriptyline 10mg instead of 30mg PO QHS because drowsiness during the following morning. She completed a methylprednisolone dose pack, and her headaches have resolved without recurrence. She currently takes amitriptyline 10mg every other night. She denies any new pain in the neck, shoulders, or arms. She reports having poorer vision than before, and has an appointment scheduled with an ophthalmologist for further evaluation and management.

## 2021-11-18 ENCOUNTER — OUTPATIENT (OUTPATIENT)
Dept: OUTPATIENT SERVICES | Facility: HOSPITAL | Age: 46
LOS: 1 days | End: 2021-11-18
Payer: MEDICAID

## 2021-11-18 ENCOUNTER — APPOINTMENT (OUTPATIENT)
Dept: MRI IMAGING | Facility: IMAGING CENTER | Age: 46
End: 2021-11-18
Payer: MEDICAID

## 2021-11-18 DIAGNOSIS — N64.52 NIPPLE DISCHARGE: ICD-10-CM

## 2021-11-18 DIAGNOSIS — Z00.8 ENCOUNTER FOR OTHER GENERAL EXAMINATION: ICD-10-CM

## 2021-11-18 PROCEDURE — A9585: CPT

## 2021-11-18 PROCEDURE — 77049 MRI BREAST C-+ W/CAD BI: CPT | Mod: 26

## 2021-11-18 PROCEDURE — C8908: CPT

## 2021-11-18 PROCEDURE — C8937: CPT

## 2021-12-28 ENCOUNTER — APPOINTMENT (OUTPATIENT)
Dept: INTERNAL MEDICINE | Facility: CLINIC | Age: 46
End: 2021-12-28

## 2022-04-11 PROBLEM — Z11.59 SCREENING FOR VIRAL DISEASE: Status: RESOLVED | Noted: 2021-02-22 | Resolved: 2021-06-11

## 2022-04-12 ENCOUNTER — APPOINTMENT (OUTPATIENT)
Dept: NEUROLOGY | Facility: CLINIC | Age: 47
End: 2022-04-12
Payer: MEDICAID

## 2022-04-12 PROCEDURE — 99215 OFFICE O/P EST HI 40 MIN: CPT | Mod: 95

## 2022-04-12 RX ORDER — AMITRIPTYLINE HYDROCHLORIDE 10 MG/1
10 TABLET, FILM COATED ORAL
Qty: 90 | Refills: 1 | Status: DISCONTINUED | COMMUNITY
Start: 2021-06-28 | End: 2022-04-12

## 2022-04-22 RX ORDER — METHYLPREDNISOLONE 4 MG/1
4 TABLET ORAL
Qty: 1 | Refills: 0 | Status: DISCONTINUED | COMMUNITY
Start: 2021-10-14 | End: 2022-04-22

## 2022-04-22 NOTE — PHYSICAL EXAM
[General Appearance - Alert] : alert [General Appearance - In No Acute Distress] : in no acute distress [General Appearance - Well Nourished] : well nourished [General Appearance - Well Developed] : well developed [Oriented To Time, Place, And Person] : oriented to person, place, and time [Impaired Insight] : insight and judgment were intact [Affect] : the affect was normal [Person] : oriented to person [Place] : oriented to place [Time] : oriented to time [Concentration Intact] : normal concentrating ability [Visual Intact] : visual attention was ~T not ~L decreased [Fluency] : fluency intact [Comprehension] : comprehension intact [Cranial Nerves Optic (II)] : visual acuity intact bilaterally,  visual fields full to confrontation, pupils equal round and reactive to light [Cranial Nerves Oculomotor (III)] : extraocular motion intact [Cranial Nerves Trigeminal (V)] : facial sensation intact symmetrically [Cranial Nerves Facial (VII)] : face symmetrical [Cranial Nerves Vestibulocochlear (VIII)] : hearing was intact bilaterally [Cranial Nerves Glossopharyngeal (IX)] : tongue and palate midline [Cranial Nerves Accessory (XI - Cranial And Spinal)] : head turning and shoulder shrug symmetric [Cranial Nerves Hypoglossal (XII)] : there was no tongue deviation with protrusion [Motor Tone] : muscle tone was normal in all four extremities [Motor Strength] : muscle strength was normal in all four extremities [No Muscle Atrophy] : normal bulk in all four extremities [Motor Handedness Right-Handed] : the patient is right hand dominant [Sensation Tactile Decrease] : light touch was intact [Sensation Pain / Temperature Decrease] : pain and temperature was intact [Abnormal Walk] : normal gait [Balance] : balance was intact [Sclera] : the sclera and conjunctiva were normal [Extraocular Movements] : extraocular movements were intact [Outer Ear] : the ears and nose were normal in appearance [Hearing Threshold Finger Rub Not Norman] : hearing was normal [Oropharynx] : the oropharynx was normal [Neck Appearance] : the appearance of the neck was normal [Nail Clubbing] : no clubbing  or cyanosis of the fingernails [Skin Color & Pigmentation] : normal skin color and pigmentation [] : no rash [Paresis Pronator Drift Right-Sided] : no pronator drift on the right [Paresis Pronator Drift Left-Sided] : no pronator drift on the left [Motor Strength Upper Extremities Bilaterally] : strength was normal in both upper extremities [Motor Strength Lower Extremities Bilaterally] : strength was normal in both lower extremities [Romberg's Sign] : Romberg's sign was negtive [Hyperesthesia] : no hyperesthesia [Past-pointing] : there was no past-pointing [Tremor] : no tremor present [Dysdiadochokinesia Bilaterally] : not present [Coordination - Dysmetria Impaired Finger-to-Nose Bilateral] : not present [Coordination - Dysmetria Impaired Heel-to-Shin Bilateral] : not present [FreeTextEntry8] : Gait and Romberg sign exams deferred by patient.

## 2022-04-22 NOTE — ASSESSMENT
[FreeTextEntry1] : 46 RHF with history of headaches, nausea, and paresthesias in the setting of Chiari I malformation, with exacerbation of symptoms in the setting of post-traumatic headache.

## 2022-04-22 NOTE — HISTORY OF PRESENT ILLNESS
[FreeTextEntry1] : FROM 5/7/19:\par This is a 43-year-old right-handed woman who has had new dull headaches over the past month, radiating to the neck as a burning sensation, relieved by over-the-counter acetaminophen. On April 28, 2019, the headache became very severe, feeling like "squeezing" at both sides of the head, associated with nausea and and "weak" feeling in the eyes. She presented to the [facility] ED and had a CT Head that raised concern for low-lying cerebellar tonsils. She was treated with some pain medications and was discharged, although the same headache with nausea and "weak" eyes occurred last week. Even when she does not have a headache, she feels weak in the legs, especially when walking up and down stairs.\par \par FROM 8/19/19:\par The patient states that she since she started taking amitriptyline and PRN metoclopramide, her headaches and neck pain have decreased in frequency, but she still sometimes experiences sharp pains at the right side of her head and pressure in her neck, but this happens up to 1-2 times per week, and not every day as before. She notes that stress at home or at work tends to trigger or aggravate these aches. She finds that metoclopramide makes her tired during the day, so she does not take that often. Since the last clinic visit, she has also noticed some mild burning pain in both of her calves when she walks, but this has improved since she started taking magnesium 500mg daily.\par \par FROM 5/21/20:\par This appointment is conducted via real-time two-way audiovisual technology due to the current COVID-19 pandemic. Verbal consent for this encounter was received by the patient. The patient was located at her home address, and I was located in [place], [place]. The patient, now 44, notes that in March and April 2020, she had symptoms of lack of smell, lack of taste, cough, and fever, and thinks she and other relatives may have been infected with COVID-19, but they were not able to get tested for it. In the middle of all that, she stopped taking amitriptyline and PRN metoclopramide as back, and she has had recurrence in the past 4-5 days of posterior headache with burning sensation, neck pain, and radiation of pain to her right shoulder and right chest, described as a soreness. Even when she was taking the amitriptyline, she would experience excessive fatigue during the day after each 10mg nighttime dose, and therefore is not eager to resume it. She notes that she presented to the ED in December 2019 for lower abdominal pain and was diagnosed with urinary tract infection; she was treated with antibiotics and improved at home.\par \par FROM 8/21/20:\par Since the last clinic visit, the patient has not had severe pain, and had used cyclobenzaprine 5mg PO QHS since the last visit on 5/21/20. However, she has not needed to use it on a standing basis since then, and only uses it as needed. She recounts that she had one episode of left-sided chest discomfort over the course of one day since I last saw her, but it resolved without her seeking medical attention, and she attributes it to stress. Otherwise, she has not had any headache, neck pain, or other body pain.\par \par FROM 6/22/21:\par This appointment is conducted via real-time two-way audiovisual technology due to the current COVID-19 pandemic, and continued via telephone due to technical difficulties. Verbal consent for this encounter was received by the patient. The patient, now 45, was located at her home address, and I was located in [place], [place]. The patient has been inconsistent about taking the medications I recommended, and she has not had any significant headaches for most of the time since I last saw her on 8/21/20. On 6/6/21, she was playing with family at her home and fell backwards on concrete when she thought there was a chair present, and hit the back of her head. She had pain, swelling, and burning sensation at the back of her head, but no loss of consciousness or change in vision. She presented to the [facility] ED on 6/7/21, where a CT Head showed no acute fracture or intracranial abnormality, only a stable Chiari I malformation. Since then, she has had intermittent feelings of burning at the back of her head, although the swelling from her head injury has subsided.\par \par FROM 7/21/21:\par This appointment is conducted via real-time two-way audiovisual technology due to the current COVID-19 pandemic. Verbal consent for this encounter was received by the patient. The patient was located at her home address, and I was located in [place], [place]. Since our last appointment on 6/22/21, the patient has completed a methylprednisolone dose pack and has started amitriptyline 10mg PO QHS, although she has not further titrated this to 30mg PO QHS. She has also used ibuprofen 600mg as needed for breakthrough headaches. She has had improvement in the frequency and intensity of headaches and posterior head burning sensations since then.\par \par FROM 11/5/21:\par This appointment is conducted via real-time two-way audiovisual technology due to the current COVID-19 pandemic. Verbal consent for this encounter was received by the patient. The patient was located at the home address, and I was located in [place], [place]. Since the last appointment on 7/21/21, I spoke with the patient by telephone on 10/14/21, when she reported having a flare-up of posterior headaches with burning sensations occurring 3 times per week. At that point, she was taking amitriptyline 10mg instead of 30mg PO QHS because drowsiness during the following morning. She completed a methylprednisolone dose pack, and her headaches have resolved without recurrence. She currently takes amitriptyline 10mg every other night. She denies any new pain in the neck, shoulders, or arms. She reports having poorer vision than before, and has an appointment scheduled with an ophthalmologist for further evaluation and management.\par \par FROM 4/12/22:\par This appointment is conducted via real-time two-way audiovisual technology due to the current COVID-19 pandemic. Verbal consent for this encounter was received by the patient. The patient was located at her home address, and I was located in [place], [place]. The patient, now 46, has been doing well overall while on Amitriptyline 10-15 mg at 7pm each night, but since 3/26/22, she has stopped taking it because it makes her very tired and she has had to help take care of her son in the hospital. She has noticed new problems with decreased focus and short-term memory, which makes her worried about driving because she can forget directions.

## 2022-04-22 NOTE — DATA REVIEWED
[FreeTextEntry1] : MRI Brain & MRA Neck (5/15/19):\par - No acute intracranial abnormality\par - Chiari I malformation\par - No neck vessel abnormalities\par \par MRA Brain & MRI Cervical Spine (5/29/19):\par - No intracranial vessel abnormalities\par - Chiari I malformation without syrinx\par - Mild multilevel cervical spondylosis\par \par CT Head (4/28/19):\par - Low-lying cerebellar tonsils, 4-5 mm below level of foramen magnum\par \par CT Head ([facility] ED, 6/7/21):\par - No acute fracture or intracranial abnormalities\par - Chiari I malformation (stable)

## 2022-06-22 ENCOUNTER — LABORATORY RESULT (OUTPATIENT)
Age: 47
End: 2022-06-22

## 2022-07-01 ENCOUNTER — NON-APPOINTMENT (OUTPATIENT)
Age: 47
End: 2022-07-01

## 2022-07-01 ENCOUNTER — EMERGENCY (EMERGENCY)
Facility: HOSPITAL | Age: 47
LOS: 1 days | Discharge: HOME | End: 2022-07-01
Attending: STUDENT IN AN ORGANIZED HEALTH CARE EDUCATION/TRAINING PROGRAM
Payer: MEDICAID

## 2022-07-01 VITALS
TEMPERATURE: 98 F | RESPIRATION RATE: 16 BRPM | DIASTOLIC BLOOD PRESSURE: 75 MMHG | WEIGHT: 110.01 LBS | SYSTOLIC BLOOD PRESSURE: 124 MMHG | HEART RATE: 77 BPM | HEIGHT: 61 IN | OXYGEN SATURATION: 99 %

## 2022-07-01 VITALS
HEART RATE: 66 BPM | SYSTOLIC BLOOD PRESSURE: 119 MMHG | DIASTOLIC BLOOD PRESSURE: 65 MMHG | OXYGEN SATURATION: 100 % | RESPIRATION RATE: 18 BRPM

## 2022-07-01 LAB
ALBUMIN SERPL ELPH-MCNC: 4.4 G/DL — SIGNIFICANT CHANGE UP (ref 3.3–5)
ALP SERPL-CCNC: 69 U/L — SIGNIFICANT CHANGE UP (ref 40–120)
ALT FLD-CCNC: 9 U/L — LOW (ref 10–45)
ANION GAP SERPL CALC-SCNC: 10 MMOL/L — SIGNIFICANT CHANGE UP (ref 5–17)
APPEARANCE UR: CLEAR — SIGNIFICANT CHANGE UP
AST SERPL-CCNC: 17 U/L — SIGNIFICANT CHANGE UP (ref 10–40)
BACTERIA # UR AUTO: NEGATIVE — SIGNIFICANT CHANGE UP
BASOPHILS # BLD AUTO: 0.04 K/UL — SIGNIFICANT CHANGE UP (ref 0–0.2)
BASOPHILS NFR BLD AUTO: 0.6 % — SIGNIFICANT CHANGE UP (ref 0–2)
BILIRUB SERPL-MCNC: 0.5 MG/DL — SIGNIFICANT CHANGE UP (ref 0.2–1.2)
BILIRUB UR-MCNC: NEGATIVE — SIGNIFICANT CHANGE UP
BUN SERPL-MCNC: 10 MG/DL — SIGNIFICANT CHANGE UP (ref 7–23)
CALCIUM SERPL-MCNC: 9.6 MG/DL — SIGNIFICANT CHANGE UP (ref 8.4–10.5)
CHLORIDE SERPL-SCNC: 102 MMOL/L — SIGNIFICANT CHANGE UP (ref 96–108)
CO2 SERPL-SCNC: 24 MMOL/L — SIGNIFICANT CHANGE UP (ref 22–31)
COLOR SPEC: COLORLESS — SIGNIFICANT CHANGE UP
CREAT SERPL-MCNC: 0.79 MG/DL — SIGNIFICANT CHANGE UP (ref 0.5–1.3)
DIFF PNL FLD: NEGATIVE — SIGNIFICANT CHANGE UP
EGFR: 93 ML/MIN/1.73M2 — SIGNIFICANT CHANGE UP
EOSINOPHIL # BLD AUTO: 0.33 K/UL — SIGNIFICANT CHANGE UP (ref 0–0.5)
EOSINOPHIL NFR BLD AUTO: 4.7 % — SIGNIFICANT CHANGE UP (ref 0–6)
EPI CELLS # UR: 0 /HPF — SIGNIFICANT CHANGE UP
GLUCOSE SERPL-MCNC: 81 MG/DL — SIGNIFICANT CHANGE UP (ref 70–99)
GLUCOSE UR QL: NEGATIVE — SIGNIFICANT CHANGE UP
HCG SERPL-ACNC: <2 MIU/ML — SIGNIFICANT CHANGE UP
HCT VFR BLD CALC: 43.7 % — SIGNIFICANT CHANGE UP (ref 34.5–45)
HGB BLD-MCNC: 14.1 G/DL — SIGNIFICANT CHANGE UP (ref 11.5–15.5)
HYALINE CASTS # UR AUTO: 0 /LPF — SIGNIFICANT CHANGE UP (ref 0–2)
IMM GRANULOCYTES NFR BLD AUTO: 0.3 % — SIGNIFICANT CHANGE UP (ref 0–1.5)
KETONES UR-MCNC: NEGATIVE — SIGNIFICANT CHANGE UP
LEUKOCYTE ESTERASE UR-ACNC: NEGATIVE — SIGNIFICANT CHANGE UP
LYMPHOCYTES # BLD AUTO: 2.21 K/UL — SIGNIFICANT CHANGE UP (ref 1–3.3)
LYMPHOCYTES # BLD AUTO: 31.5 % — SIGNIFICANT CHANGE UP (ref 13–44)
MCHC RBC-ENTMCNC: 27 PG — SIGNIFICANT CHANGE UP (ref 27–34)
MCHC RBC-ENTMCNC: 32.3 GM/DL — SIGNIFICANT CHANGE UP (ref 32–36)
MCV RBC AUTO: 83.6 FL — SIGNIFICANT CHANGE UP (ref 80–100)
MONOCYTES # BLD AUTO: 0.41 K/UL — SIGNIFICANT CHANGE UP (ref 0–0.9)
MONOCYTES NFR BLD AUTO: 5.8 % — SIGNIFICANT CHANGE UP (ref 2–14)
NEUTROPHILS # BLD AUTO: 4 K/UL — SIGNIFICANT CHANGE UP (ref 1.8–7.4)
NEUTROPHILS NFR BLD AUTO: 57.1 % — SIGNIFICANT CHANGE UP (ref 43–77)
NITRITE UR-MCNC: NEGATIVE — SIGNIFICANT CHANGE UP
NRBC # BLD: 0 /100 WBCS — SIGNIFICANT CHANGE UP (ref 0–0)
PH UR: 6.5 — SIGNIFICANT CHANGE UP (ref 5–8)
PLATELET # BLD AUTO: 222 K/UL — SIGNIFICANT CHANGE UP (ref 150–400)
POTASSIUM SERPL-MCNC: 3.9 MMOL/L — SIGNIFICANT CHANGE UP (ref 3.5–5.3)
POTASSIUM SERPL-SCNC: 3.9 MMOL/L — SIGNIFICANT CHANGE UP (ref 3.5–5.3)
PROT SERPL-MCNC: 8.2 G/DL — SIGNIFICANT CHANGE UP (ref 6–8.3)
PROT UR-MCNC: NEGATIVE — SIGNIFICANT CHANGE UP
RBC # BLD: 5.23 M/UL — HIGH (ref 3.8–5.2)
RBC # FLD: 12.5 % — SIGNIFICANT CHANGE UP (ref 10.3–14.5)
RBC CASTS # UR COMP ASSIST: 0 /HPF — SIGNIFICANT CHANGE UP (ref 0–4)
SODIUM SERPL-SCNC: 136 MMOL/L — SIGNIFICANT CHANGE UP (ref 135–145)
SP GR SPEC: 1 — LOW (ref 1.01–1.02)
UROBILINOGEN FLD QL: NEGATIVE — SIGNIFICANT CHANGE UP
WBC # BLD: 7.01 K/UL — SIGNIFICANT CHANGE UP (ref 3.8–10.5)
WBC # FLD AUTO: 7.01 K/UL — SIGNIFICANT CHANGE UP (ref 3.8–10.5)
WBC UR QL: 0 /HPF — SIGNIFICANT CHANGE UP (ref 0–5)

## 2022-07-01 PROCEDURE — 99284 EMERGENCY DEPT VISIT MOD MDM: CPT | Mod: 25

## 2022-07-01 PROCEDURE — 84702 CHORIONIC GONADOTROPIN TEST: CPT

## 2022-07-01 PROCEDURE — 80053 COMPREHEN METABOLIC PANEL: CPT

## 2022-07-01 PROCEDURE — 85025 COMPLETE CBC W/AUTO DIFF WBC: CPT

## 2022-07-01 PROCEDURE — 81001 URINALYSIS AUTO W/SCOPE: CPT

## 2022-07-01 PROCEDURE — 99284 EMERGENCY DEPT VISIT MOD MDM: CPT

## 2022-07-01 PROCEDURE — 87086 URINE CULTURE/COLONY COUNT: CPT

## 2022-07-01 PROCEDURE — 96374 THER/PROPH/DIAG INJ IV PUSH: CPT

## 2022-07-01 RX ORDER — KETOROLAC TROMETHAMINE 30 MG/ML
15 SYRINGE (ML) INJECTION ONCE
Refills: 0 | Status: DISCONTINUED | OUTPATIENT
Start: 2022-07-01 | End: 2022-07-01

## 2022-07-01 RX ORDER — ACETAMINOPHEN 500 MG
650 TABLET ORAL ONCE
Refills: 0 | Status: COMPLETED | OUTPATIENT
Start: 2022-07-01 | End: 2022-07-01

## 2022-07-01 RX ORDER — IBUPROFEN 200 MG
400 TABLET ORAL ONCE
Refills: 0 | Status: COMPLETED | OUTPATIENT
Start: 2022-07-01 | End: 2022-07-01

## 2022-07-01 RX ADMIN — Medication 400 MILLIGRAM(S): at 12:39

## 2022-07-01 RX ADMIN — Medication 15 MILLIGRAM(S): at 13:42

## 2022-07-01 RX ADMIN — Medication 650 MILLIGRAM(S): at 12:39

## 2022-07-01 NOTE — ED PROVIDER NOTE - NS ED ATTENDING STATEMENT MOD
I have seen and examined this patient and fully participated in the care of this patient as the teaching attending.  The service was shared with the RODO.  I reviewed and verified the documentation and independently performed the documented:

## 2022-07-01 NOTE — ED ADULT TRIAGE NOTE - CHIEF COMPLAINT QUOTE
having cramping abdominal pain, and lower back pain for the past two weeks - was treated for UTI with keflex on 6/22  still having the abdominal cramping and back pain

## 2022-07-01 NOTE — ED PROVIDER NOTE - ADDITIONAL NOTES AND INSTRUCTIONS:
Please excuse from work/school as he/she was being evaluated in the Emergency Room at [facility].

## 2022-07-01 NOTE — ED PROVIDER NOTE - PATIENT PORTAL LINK FT
You can access the FollowMyHealth Patient Portal offered by [facility] by registering at the following website: [email or web address]. By joining [facility]’s FollowMyHealth portal, you will also be able to view your health information using other applications (apps) compatible with our system.

## 2022-07-01 NOTE — ED PROVIDER NOTE - NSICDXPASTMEDICALHX_GEN_ALL_CORE_FT
PAST MEDICAL HISTORY:  Nonintractable headache, unspecified chronicity pattern, unspecified headache type Chiari malformation- unspecified type

## 2022-07-01 NOTE — ED PROVIDER NOTE - CLINICAL SUMMARY MEDICAL DECISION MAKING FREE TEXT BOX
Attending (Nico Meyers D.O.):   46F lmp 6/11, recent tx with kelfex 500mg q8h here for lower back pain with crampy suprapubic abd pain. No pain with urination. Benign abdomen. No cva ttp. Not currently menstruating. Hemodynamically stable. Will eval for continued signs of urinary tract infection. No vaginal discahrge. No hx of sti. Will eval for pregnancy. Check labs, Ua, hcg, analgesia.

## 2022-07-01 NOTE — ED PROVIDER NOTE - NSFOLLOWUPINSTRUCTIONS_ED_ALL_ED_FT
-You were seen in the Emergency Department (ED) for abdominal pain. Lab and imaging results, if performed, were discussed with you along with your discharge diagnosis.    FOLLOW-UP:  -Please follow up with your PMD if symptoms return or for any concerning matter pertaining to your abdominal pain.  -Please follow up with your private physician within the next 72 hours. Tell them you were recently in the ED for an urgent issue and would like to be seen. Bring copies of your results if you were given.   -If you do not have a PMD, please call [phone] to find one convenient for you or call our clinic at [phone].    MEDICATIONS:  -Continue all other prescribed medicine, IF ANY, as per your primary care doctor's (PMD) recommendations.    PAIN CONTROL:  -Please take over the counter Tylenol (also known as acetaminophen) 650mg every 6 hours or Ibuprofen (also known as motrin, advil) 600mg every 8 hour for your pain, IF ANY, unless you are not supposed to for any reason.  -Rest, stay hydrated with plenty of fluids (drink at least 2 Liters or 64 Ounces of water each day UNLESS you are supposed to restrict fluids or ANY reason.    RETURN PRECAUTIONS:  -Please return to the Emergency Department if you experience ANY new or concerning symptoms, such as, but not limited to: worsening pain, large amount of bleeding, passing out, fever >100.F, shaking chills, inability to see or new double vision, chest pain, difficulty breathing, diffuse abdominal pain, unable to eat or drink, continuous vomiting or diarrhea, unable to move or feel part of your body

## 2022-07-01 NOTE — ED PROVIDER NOTE - ATTENDING CONTRIBUTION TO CARE
Attending (Nico Meyers D.O.):  I have personally seen and examined this patient. I have performed a substantive portion of the visit including all aspects of the medical decision making. Resident and/or ACP note reviewed. I agree on the plan of care except where noted.

## 2022-07-01 NOTE — ED ADULT NURSE NOTE - OBJECTIVE STATEMENT
46 Y F presents to the ED with cramping abdominal pain, and lower back pain for the past two weeks - was treated for UTI with keflex on 6/22, reports that she is still having the abdominal cramping and back pain. Reports that when she began to take Keflex, she started to have some SOB but still took it. Reports lightheadedness and headaches and leg cramps as well. On assessment, A&Ox4. Denies lightheadedness, numbness and tingling. Breathing spontaneously and unlabored on Room air. Denies cough and CP. No Peripheral edema. Peripheral pulses strong and equal bilaterally. Denies CP, SOB and palpitations. Abdomen soft, nondistended. Pt is continent. Denies n/v/d, dysuria, melena and hematuria. IV placed 20g in RAC. Labs drawn and sent. Pt safety maintained. Call bell within reach. Side rails in upward position. Pt awaiting dispo.

## 2022-07-01 NOTE — ED PROVIDER NOTE - PROGRESS NOTE DETAILS
patient reassessed, pain is mildly improved from initial assessment. her abdomen exam was benign from initial assessment and remains benign. comfortable appearing at bedside. lab work not suggestive of infectious etiology. no vaginal discharge. not pregnant. patient to be discharged with follow up with obgyn.

## 2022-07-01 NOTE — ED PROVIDER NOTE - PHYSICAL EXAMINATION
GENERAL: no acute distress, non-toxic appearing  HEAD: normocephalic, atraumatic  HEENT: normal conjunctiva, oral mucosa moist, neck supple  CARDIAC: regular rate and rhythm, normal S1 and S2,  no appreciable murmurs  PULM: clear to ascultation bilaterally, no crackles, rales, rhonchi, or wheezing    GI: abdomen nondistended, soft, nontender, no guarding or rebound tenderness    : no CVA tenderness, no suprapubic tenderness  NEURO: alert and oriented x 3, normal speech, PERRLA, EOMI, no focal motor or sensory deficits, nonantalgic gait  MSK: no visible deformities, no peripheral edema, calf tenderness/redness/swelling  SKIN: no visible rashes, dry, well-perfused  PSYCH: appropriate mood and affect

## 2022-07-02 LAB
CULTURE RESULTS: NO GROWTH — SIGNIFICANT CHANGE UP
SPECIMEN SOURCE: SIGNIFICANT CHANGE UP

## 2022-07-06 ENCOUNTER — OUTPATIENT (OUTPATIENT)
Dept: OUTPATIENT SERVICES | Facility: HOSPITAL | Age: 47
LOS: 1 days | End: 2022-07-06
Payer: MEDICAID

## 2022-07-06 ENCOUNTER — APPOINTMENT (OUTPATIENT)
Dept: INTERNAL MEDICINE | Facility: CLINIC | Age: 47
End: 2022-07-06

## 2022-07-06 DIAGNOSIS — R10.30 LOWER ABDOMINAL PAIN, UNSPECIFIED: ICD-10-CM

## 2022-07-06 PROCEDURE — G0463: CPT

## 2022-07-06 PROCEDURE — 99212 OFFICE O/P EST SF 10 MIN: CPT | Mod: GE,95

## 2022-07-07 DIAGNOSIS — I10 ESSENTIAL (PRIMARY) HYPERTENSION: ICD-10-CM

## 2022-07-07 NOTE — ASSESSMENT
[FreeTextEntry1] : 46F with PMH Chiari I malformation (on duloxetine 20mg qd) who presents as a follow-up after an ED visit on 7/1 for crampy lower abdominal discomfort and back pain; symptoms now resolved on today's visit. \par \par #HCM\par - Pt is due for colon cancer screening; discussed colonoscopy with patient given pt's FHx of colon cancer and age >45- pt. to think about her options\par - RTC in 3-4 months for annual exam

## 2022-07-07 NOTE — REVIEW OF SYSTEMS
[Negative] : Heme/Lymph [Nausea] : no nausea [Constipation] : no constipation [Diarrhea] : diarrhea [Vomiting] : no vomiting [Heartburn] : no heartburn [Dysuria] : no dysuria [Incontinence] : no incontinence [Hematuria] : no hematuria [Frequency] : no frequency [Vaginal Discharge] : no vaginal discharge [FreeTextEntry7] : abdominal cramps

## 2022-07-07 NOTE — HISTORY OF PRESENT ILLNESS
[Home] : at home, [unfilled] , at the time of the visit. [Medical Office: ([facility])___] : at the medical office located in  [Verbal consent obtained from patient] : the patient, [unfilled] [FreeTextEntry1] : hospital f/u for UTI [de-identified] : 46F with PMH Chiari I malformation (on duloxetine 20mg qd) who presents as a follow-up after an ED visit on 7/1 for crampy lower abdominal discomfort and back pain. Pt. states that 2 weeks prior to the ED visit, she had lower abdominal pain and associated urinary frequency. Pt. went to her OBGYN on 6/22 b/c she felt like she was having a UTI similar to those she has had in the past. During that visit, a U/A and UCx were performed which showed +nitrites and no growth, respectively. She was started on Keflex 500mg q8h which she took until 6/30. Of note, the pt. states she had SOB w/ the Keflex but continued taking the medication despite feeling like it did not help her. On 7/1, the pt. had worsening lower abdominal discomfort and back pain, 8/10 in intensity, prompting her to visit the ED. She notes that she typically gets cramps w/ her menstrual cycle but her current pain was different. In the ED, she had basic labs drawn, UCx and UA sent, and a urine pregnancy test, which were all normal, including no growth on the UCx and negative nitrites/leuk esterase on the UA. She was given ibuprofen and Toradol for analgesia with improvement in symptoms and discharged.\par \par During today's visit, the pt. reports resolution of her lower abdominal pain/back pain. The pt. is currently menstruating and is having her usual menstrual cramps and the pain she was experiencing during the ED visit has now subsided with Tylenol. She denies any UTI symptoms including burning and pain with urination, urgency, frequency, or hematuria. She denies N/V, fevers/chills, SOB, diarrhea, constipation, changes to bowel habits, or vaginal discharge.

## 2022-07-07 NOTE — END OF VISIT
[] : Resident [FreeTextEntry3] : 45yo with PMHx UTI who presents for f/u of lower abd pain. Initially treated with Keflex however had worsening/non-improvement of sx  and presented to ED. Lab work-up was unremarkable. Sx has since resolved. Suspect she did not have a UTI, but regardless sx have resolved since ED visit.

## 2022-07-07 NOTE — PHYSICAL EXAM
[No Acute Distress] : no acute distress [Well Nourished] : well nourished [No Respiratory Distress] : no respiratory distress  [de-identified] : Limited due to telehealth visit [de-identified] : Nontender to patient's self-palpation

## 2022-07-13 ENCOUNTER — APPOINTMENT (OUTPATIENT)
Dept: INTERNAL MEDICINE | Facility: CLINIC | Age: 47
End: 2022-07-13

## 2022-07-25 ENCOUNTER — APPOINTMENT (OUTPATIENT)
Dept: NEUROLOGY | Facility: CLINIC | Age: 47
End: 2022-07-25

## 2022-07-25 PROCEDURE — 96116 NUBHVL XM PHYS/QHP 1ST HR: CPT | Mod: 59,95

## 2022-07-25 PROCEDURE — 99215 OFFICE O/P EST HI 40 MIN: CPT | Mod: 25,95

## 2022-07-25 PROCEDURE — G2212 PROLONG OUTPT/OFFICE VIS: CPT | Mod: 95

## 2022-07-25 RX ORDER — CEPHALEXIN 500 MG/1
500 CAPSULE ORAL 3 TIMES DAILY
Qty: 21 | Refills: 0 | Status: DISCONTINUED | COMMUNITY
Start: 2022-06-22 | End: 2022-07-25

## 2022-07-25 NOTE — PHYSICAL EXAM
[General Appearance - Alert] : alert [General Appearance - In No Acute Distress] : in no acute distress [General Appearance - Well Nourished] : well nourished [General Appearance - Well Developed] : well developed [Oriented To Time, Place, And Person] : oriented to person, place, and time [Impaired Insight] : insight and judgment were intact [Affect] : the affect was normal [Person] : oriented to person [Place] : oriented to place [Time] : oriented to time [Concentration Intact] : normal concentrating ability [Visual Intact] : visual attention was ~T not ~L decreased [Fluency] : fluency intact [Comprehension] : comprehension intact [Cranial Nerves Optic (II)] : visual acuity intact bilaterally,  visual fields full to confrontation, pupils equal round and reactive to light [Cranial Nerves Oculomotor (III)] : extraocular motion intact [Cranial Nerves Trigeminal (V)] : facial sensation intact symmetrically [Cranial Nerves Facial (VII)] : face symmetrical [Cranial Nerves Vestibulocochlear (VIII)] : hearing was intact bilaterally [Cranial Nerves Glossopharyngeal (IX)] : tongue and palate midline [Cranial Nerves Accessory (XI - Cranial And Spinal)] : head turning and shoulder shrug symmetric [Cranial Nerves Hypoglossal (XII)] : there was no tongue deviation with protrusion [Motor Tone] : muscle tone was normal in all four extremities [Motor Strength] : muscle strength was normal in all four extremities [No Muscle Atrophy] : normal bulk in all four extremities [Motor Handedness Right-Handed] : the patient is right hand dominant [Sensation Tactile Decrease] : light touch was intact [Sensation Pain / Temperature Decrease] : pain and temperature was intact [Abnormal Walk] : normal gait [Balance] : balance was intact [Sclera] : the sclera and conjunctiva were normal [Extraocular Movements] : extraocular movements were intact [Outer Ear] : the ears and nose were normal in appearance [Hearing Threshold Finger Rub Not [place]] : hearing was normal [Oropharynx] : the oropharynx was normal [Neck Appearance] : the appearance of the neck was normal [Nail Clubbing] : no clubbing  or cyanosis of the fingernails [Skin Color & Pigmentation] : normal skin color and pigmentation [] : no rash [FreeTextEntry1] : Neurocognitive Examination Functionality Questionnaire\par \par Relationship: Self\par Frequency of interactions in the past month: Daily / Lives with\par \par [place] Index of [place] in Activities of Daily Living:\par 1. Bathing/Showerin\par 2. Dressin\par 3. Toiletin\par 4. Transferrin\par 5. Continence: 1\par 6: Feedin\par \par TOTAL: 6\par \par \par [place] Instrumental Activities of Daily Living:\par A. Ability to Use Telephone: 1\par B. Shoppin\par C. Food Preparation: 1\par D. Housekeepin (light daily tasks due to neck pain)\par E. Laundry: 1\par F. Transportation: 1\par G. Responsibility for Own Medications: 1\par H: Ability to Handle Finances: 1\par \par TOTAL: 8\par \par \par Extended Neurobehavioral Status Testing and interpretation are outlined in the Procedure section.\par  [Total Score ___ / 30] : the patient achieved a score of [unfilled] /30 [Date / Time ___ / 5] : date / time [unfilled] / 5 [Place ___ / 5] : place [unfilled] / 5 [Registration ___ / 3] : registration [unfilled] / 3 [Serial Sevens ___/5] : serial sevens [unfilled] / 5 [Naming 2 Objects ___ / 2] : naming two objects [unfilled] / 2 [Repeating a Sentence ___ / 1] : repeating a sentence [unfilled] / 1 [Writing a Sentence ___ / 1] : write sentence [unfilled] / 1 [3-stage Verbal Command ___ / 3] : three-stage verbal command [unfilled] / 3 [Written Command ___ / 1] : written command [unfilled] / 1 [Copy a Design ___ / 1] : copy a design [unfilled] / 1 [Recall ___ / 3] : recall [unfilled] / 3 [Paresis Pronator Drift Right-Sided] : no pronator drift on the right [Paresis Pronator Drift Left-Sided] : no pronator drift on the left [Motor Strength Upper Extremities Bilaterally] : strength was normal in both upper extremities [Motor Strength Lower Extremities Bilaterally] : strength was normal in both lower extremities [Romberg's Sign] : Romberg's sign was negtive [Allodynia] : no ~T allodynia present [Hyperesthesia] : no hyperesthesia [Past-pointing] : there was no past-pointing [Tremor] : no tremor present [Dysdiadochokinesia Bilaterally] : not present [Coordination - Dysmetria Impaired Finger-to-Nose Bilateral] : not present [Coordination - Dysmetria Impaired Heel-to-Shin Bilateral] : not present [FreeTextEntry8] : Normal, narrow-based gait. Specialized gait testing deferred by patient.

## 2022-07-25 NOTE — ASSESSMENT
[FreeTextEntry1] : 46 RHF with history of headaches, nausea, and paresthesias in the setting of Chiari I malformation, with exacerbation of symptoms in the setting of post-traumatic headache, also with effects on attention and mood but without evidence of mild cognitive impairment.

## 2022-07-25 NOTE — PROCEDURE
[FreeTextEntry1] : EXTENDED NEUROBEHAVIORAL STATUS TESTING\par \par [This is a separate procedure note for the Neurobehavioral Status Examination performed during this encounter.]\par \par Ms. SCHAFFER was awake and alert, well groomed, and in no acute distress. She had fluent speech and made no paraphasic errors. There was no anomia in conversation. Comprehension was intact. She was engaged in the discussion. She recounted her history well. She did not appear anxious or depressed.\par \par Recent memory: Ms. SCHAFFER can name the president and can say what she did yesterday.\par \par MMSE Score out of 30: 30\par \par Orientation:\par      Time: \par      Place: \par \par Registration: 3/3\par \par Attention and Calculation: \par \par Recall: 3/3\par \par Language:\par      Namin/2\par      Repeat sentence: \par      Three-stage command: 3/3\par      Read & obey command: \par      Write sentence: \par \par Copyin/1\par \par \par \par Additional Neurobehavioral status tests:\par \par Animal naming fluency: 12 words\par \par Praxis:\par \par Ideomotor limb\par Transitive:\par           Brush teeth: Intact b/l\par           Comb hair: Intact b/l\par Intransitive:\par           Wave goodbye: Intact b/l\par           Motion "come here": Intact b/l\par Meaningless gestures: Intact to 2/\par \par Right/Left Orientation:\par           "Show me your right hand": Correct\par           "Show me your left hand": Correct\par           "With your right hand, point to your right ear": Correct\par           "With your left hand, point to your right shoulder": Correct\par \par \par INTERPRETATION:\par \par I have carefully reviewed the above neurobehavioral status testing results. The cognitive domains are listed below, as well as my interpretation of whether or not there is an impairment or if performance was within normal limits. This differs from standard neuropsychological testing, since the raw scores alone on different validated batteries of tests may not detect or may overestimate subtle deficits.\par \par Cognitive domains:\par           Attention: Intact\par           Working Memory: Intact\par           Executive Function: Intact\par           Language: Phonemic fluency & Semantic fluency intact\par           Memory: Intact\par           Visuospatial Function: Intact\par           Praxis: Intact\par           Behavior/Mood: Both normal\par           Other comments: None\par \par 60 minutes were spent administering and interpreting the extended neurobehavioral status testing, as well as preparing this report.\par \par Testing start time: 2:30 pm\par Testing end time: 3:00 pm\par Report time: 30 minutes\par \par

## 2022-07-25 NOTE — HISTORY OF PRESENT ILLNESS
[FreeTextEntry1] : FROM 5/7/19:\par This is a 43-year-old right-handed woman who has had new dull headaches over the past month, radiating to the neck as a burning sensation, relieved by over-the-counter acetaminophen. On April 28, 2019, the headache became very severe, feeling like "squeezing" at both sides of the head, associated with nausea and and "weak" feeling in the eyes. She presented to the [facility] ED and had a CT Head that raised concern for low-lying cerebellar tonsils. She was treated with some pain medications and was discharged, although the same headache with nausea and "weak" eyes occurred last week. Even when she does not have a headache, she feels weak in the legs, especially when walking up and down stairs.\par \par FROM 8/19/19:\par The patient states that she since she started taking amitriptyline and PRN metoclopramide, her headaches and neck pain have decreased in frequency, but she still sometimes experiences sharp pains at the right side of her head and pressure in her neck, but this happens up to 1-2 times per week, and not every day as before. She notes that stress at home or at work tends to trigger or aggravate these aches. She finds that metoclopramide makes her tired during the day, so she does not take that often. Since the last clinic visit, she has also noticed some mild burning pain in both of her calves when she walks, but this has improved since she started taking magnesium 500mg daily.\par \par FROM 5/21/20:\par This appointment is conducted via real-time two-way audiovisual technology due to the current COVID-19 pandemic. Verbal consent for this encounter was received by the patient. The patient was located at her home address, and I was located in [place], [place]. The patient, now 44, notes that in March and April 2020, she had symptoms of lack of smell, lack of taste, cough, and fever, and thinks she and other relatives may have been infected with COVID-19, but they were not able to get tested for it. In the middle of all that, she stopped taking amitriptyline and PRN metoclopramide as back, and she has had recurrence in the past 4-5 days of posterior headache with burning sensation, neck pain, and radiation of pain to her right shoulder and right chest, described as a soreness. Even when she was taking the amitriptyline, she would experience excessive fatigue during the day after each 10mg nighttime dose, and therefore is not eager to resume it. She notes that she presented to the ED in December 2019 for lower abdominal pain and was diagnosed with urinary tract infection; she was treated with antibiotics and improved at home.\par \par FROM 8/21/20:\par Since the last clinic visit, the patient has not had severe pain, and had used cyclobenzaprine 5mg PO QHS since the last visit on 5/21/20. However, she has not needed to use it on a standing basis since then, and only uses it as needed. She recounts that she had one episode of left-sided chest discomfort over the course of one day since I last saw her, but it resolved without her seeking medical attention, and she attributes it to stress. Otherwise, she has not had any headache, neck pain, or other body pain.\par \par FROM 6/22/21:\par This appointment is conducted via real-time two-way audiovisual technology due to the current COVID-19 pandemic, and continued via telephone due to technical difficulties. Verbal consent for this encounter was received by the patient. The patient, now 45, was located at her home address, and I was located in [place], [place]. The patient has been inconsistent about taking the medications I recommended, and she has not had any significant headaches for most of the time since I last saw her on 8/21/20. On 6/6/21, she was playing with family at her home and fell backwards on concrete when she thought there was a chair present, and hit the back of her head. She had pain, swelling, and burning sensation at the back of her head, but no loss of consciousness or change in vision. She presented to the [facility] ED on 6/7/21, where a CT Head showed no acute fracture or intracranial abnormality, only a stable Chiari I malformation. Since then, she has had intermittent feelings of burning at the back of her head, although the swelling from her head injury has subsided.\par \par FROM 7/21/21:\par This appointment is conducted via real-time two-way audiovisual technology due to the current COVID-19 pandemic. Verbal consent for this encounter was received by the patient. The patient was located at her home address, and I was located in [place], [place]. Since our last appointment on 6/22/21, the patient has completed a methylprednisolone dose pack and has started amitriptyline 10mg PO QHS, although she has not further titrated this to 30mg PO QHS. She has also used ibuprofen 600mg as needed for breakthrough headaches. She has had improvement in the frequency and intensity of headaches and posterior head burning sensations since then.\par \par FROM 11/5/21:\par This appointment is conducted via real-time two-way audiovisual technology due to the current COVID-19 pandemic. Verbal consent for this encounter was received by the patient. The patient was located at the home address, and I was located in [place], [place]. Since the last appointment on 7/21/21, I spoke with the patient by telephone on 10/14/21, when she reported having a flare-up of posterior headaches with burning sensations occurring 3 times per week. At that point, she was taking amitriptyline 10mg instead of 30mg PO QHS because drowsiness during the following morning. She completed a methylprednisolone dose pack, and her headaches have resolved without recurrence. She currently takes amitriptyline 10mg every other night. She denies any new pain in the neck, shoulders, or arms. She reports having poorer vision than before, and has an appointment scheduled with an ophthalmologist for further evaluation and management.\par \par FROM 4/12/22:\par This appointment is conducted via real-time two-way audiovisual technology due to the current COVID-19 pandemic. Verbal consent for this encounter was received by the patient. The patient was located at her home address, and I was located in [place], [place]. The patient, now 46, has been doing well overall while on Amitriptyline 10-15 mg at 7pm each night, but since 3/26/22, she has stopped taking it because it makes her very tired and she has had to help take care of her son in the hospital. She has noticed new problems with decreased focus and short-term memory, which makes her worried about driving because she can forget directions.\par \par FROM 7/25/22:\par This appointment is conducted via real-time two-way audiovisual technology due to the current COVID-19 pandemic. Verbal consent for this encounter was received by the patient. The patient was located at her home address, and I was located in [place], [place]. She has been tolerating Duloxetine 20mg daily at bedtime without adverse effects. The patient states that she sometimes has pain at the back of the neck, especially when stressed or doing a lot of work, but otherwise has not had severe headaches as she had before. She presented to the [facility] ED on 7/1/22 for urinary tract infection with associated lower abdominal and back pain, and was discharged with a course of cephalexin. She states that her focus and short-term memory are still not like they used to be, and that she can have a short temper at times, but these are not as bad as they were when I last saw her on 4/12/22. She continues to have poor near vision, requiring reading glasses.

## 2022-07-31 NOTE — ED PROVIDER NOTE - NS ED ROS FT
Gen: Denies fevers  CV: Denies chest pain  Skin: denies color changes  Resp: Denies SOB, cough  Endo: Denies increased urination  GI: + suprapubic, RLQ pain  Msk: Denies extremity pain  : Denies dysuria, hematuria  Neuro: Denies LOC  Psych: Denies mood changes
verbal cues/nonverbal cues (demo/gestures)/2 person assist

## 2022-08-17 ENCOUNTER — APPOINTMENT (OUTPATIENT)
Dept: INTERNAL MEDICINE | Facility: CLINIC | Age: 47
End: 2022-08-17

## 2022-08-17 ENCOUNTER — OUTPATIENT (OUTPATIENT)
Dept: OUTPATIENT SERVICES | Facility: HOSPITAL | Age: 47
LOS: 1 days | End: 2022-08-17
Payer: MEDICAID

## 2022-08-17 VITALS
BODY MASS INDEX: 20.77 KG/M2 | WEIGHT: 110 LBS | SYSTOLIC BLOOD PRESSURE: 122 MMHG | OXYGEN SATURATION: 99 % | HEART RATE: 67 BPM | DIASTOLIC BLOOD PRESSURE: 74 MMHG | HEIGHT: 61 IN

## 2022-08-17 DIAGNOSIS — Z12.11 ENCOUNTER FOR SCREENING FOR MALIGNANT NEOPLASM OF COLON: ICD-10-CM

## 2022-08-17 DIAGNOSIS — I10 ESSENTIAL (PRIMARY) HYPERTENSION: ICD-10-CM

## 2022-08-17 PROCEDURE — 99396 PREV VISIT EST AGE 40-64: CPT | Mod: GC

## 2022-08-17 NOTE — HISTORY OF PRESENT ILLNESS
[FreeTextEntry1] : CPE [de-identified] : 46 year old female with history of Chiari I malformation with resulting recurrent headaches presenting for a CPE. The patient was last seen a year ago without any new complains or changes in life. She notes that her headaches are well controlled with duloxetine and she sees her neurology for management. She denies fevers, chills, changes in appetite or weight. Endorses changes in her vision but notes that she has an appointment for evaluation soon. Denies any other symptoms including no chest pain, SOB, n/v/c/d, urinary symptoms. She is currently sexually active with 1 partner and has no concerns regarding her sexual health. No vaginal discharge and has regular menstrual cycles with no concerns for irregular bleeding. She is up to date on her COVID vaccinations but has no record of her Tdap. \par \par She was recently seen last month for UTI symptoms, resolved with abx treatment with no further concerns at this visit. Patient with previous colonoscopy in 2014 with normal results 2/2 family history.

## 2022-08-17 NOTE — PLAN
[FreeTextEntry1] : 46 year old female with history of Chiari I malformation with resulting recurrent headaches presenting for a CPE. Vitals wnl with benign physical examination. No concerns from patient aside from vision changes for which she has an appointment. \par \par #HCM\par - patient with prior colonoscopy in 2014, suggested that she obtain and bring in results, as per patient no abnormal findings\par - referral for colonoscopy provided iso family history, almost 10 years since last colonoscopy, patient expressed dislike of colonoscopy but is aware of its role as a screening tool and is willing to undergo the procedure\par - discussed Tdap as patient did not have records on file, will provide in office today\par \par #headaches\par - well controlled with duloxetine by her neurologist

## 2022-08-17 NOTE — PAST MEDICAL HISTORY
[Menstruating] : menstruating [Normal Amount/Duration] : it was of a normal amount and duration [Regular Cycle Intervals] : have been regular [Live Births ___] : P[unfilled]

## 2022-08-17 NOTE — HEALTH RISK ASSESSMENT
[No] : No [Very Good] : ~his/her~  mood as very good [Never] : Never [No falls in past year] : Patient reported no falls in the past year [0] : 2) Feeling down, depressed, or hopeless: Not at all (0) [Patient reported mammogram was normal] : Patient reported mammogram was normal [Patient reported PAP Smear was normal] : Patient reported PAP Smear was normal [None] : None [With Significant Other] : lives with significant other [Employed] : employed [Sexually Active] : sexually active [Feels Safe at Home] : Feels safe at home [Fully functional (bathing, dressing, toileting, transferring, walking, feeding)] : Fully functional (bathing, dressing, toileting, transferring, walking, feeding) [Reports changes in vision] : Reports changes in vision [Reports normal functional visual acuity (ie: able to read med bottle)] : Reports normal functional visual acuity [Safety elements used in home] : safety elements used in home [de-identified] : Works as a  at a restaurant, 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 but says is busy with work and children [de-identified] : Endorses healthy eating habits with rare fast food [[id number]] : 0 [Change in mental status noted] : No change in mental status noted [Language] : denies difficulty with language [Behavior] : denies difficulty with behavior [Learning/Retaining New Information] : denies difficulty learning/retaining new information [Handling Complex Tasks] : denies difficulty handling complex tasks [Reasoning] : denies difficulty with reasoning [Spatial Ability and Orientation] : denies difficulty with spatial ability and orientation [High Risk Behavior] : no high risk behavior [Reports changes in hearing] : Reports no changes in hearing [MammogramDate] : 09/21 [PapSmearDate] : 03/19 [FreeTextEntry2] :

## 2022-08-29 DIAGNOSIS — Z23 ENCOUNTER FOR IMMUNIZATION: ICD-10-CM

## 2022-08-29 DIAGNOSIS — Z00.00 ENCOUNTER FOR GENERAL ADULT MEDICAL EXAMINATION WITHOUT ABNORMAL FINDINGS: ICD-10-CM

## 2022-08-29 DIAGNOSIS — G93.5 COMPRESSION OF BRAIN: ICD-10-CM

## 2022-08-29 DIAGNOSIS — Z12.11 ENCOUNTER FOR SCREENING FOR MALIGNANT NEOPLASM OF COLON: ICD-10-CM

## 2022-10-17 ENCOUNTER — APPOINTMENT (OUTPATIENT)
Dept: NEUROLOGY | Facility: CLINIC | Age: 47
End: 2022-10-17

## 2022-10-24 ENCOUNTER — APPOINTMENT (OUTPATIENT)
Dept: NEUROLOGY | Facility: CLINIC | Age: 47
End: 2022-10-24

## 2022-10-24 DIAGNOSIS — R05.3 CHRONIC COUGH: ICD-10-CM

## 2022-10-24 PROCEDURE — 99215 OFFICE O/P EST HI 40 MIN: CPT | Mod: 95

## 2022-11-28 ENCOUNTER — LABORATORY RESULT (OUTPATIENT)
Age: 47
End: 2022-11-28

## 2022-11-30 ENCOUNTER — APPOINTMENT (OUTPATIENT)
Dept: OBGYN | Facility: CLINIC | Age: 47
End: 2022-11-30

## 2022-11-30 ENCOUNTER — LABORATORY RESULT (OUTPATIENT)
Age: 47
End: 2022-11-30

## 2022-11-30 ENCOUNTER — OUTPATIENT (OUTPATIENT)
Dept: OUTPATIENT SERVICES | Facility: HOSPITAL | Age: 47
LOS: 1 days | End: 2022-11-30
Payer: MEDICAID

## 2022-11-30 ENCOUNTER — RESULT REVIEW (OUTPATIENT)
Age: 47
End: 2022-11-30

## 2022-11-30 VITALS — SYSTOLIC BLOOD PRESSURE: 126 MMHG | WEIGHT: 112 LBS | BODY MASS INDEX: 21.16 KG/M2 | DIASTOLIC BLOOD PRESSURE: 80 MMHG

## 2022-11-30 DIAGNOSIS — N90.4 LEUKOPLAKIA OF VULVA: ICD-10-CM

## 2022-11-30 DIAGNOSIS — Z01.419 ENCOUNTER FOR GYNECOLOGICAL EXAMINATION (GENERAL) (ROUTINE) WITHOUT ABNORMAL FINDINGS: ICD-10-CM

## 2022-11-30 DIAGNOSIS — Z11.3 ENCOUNTER FOR SCREENING FOR INFECTIONS WITH A PREDOMINANTLY SEXUAL MODE OF TRANSMISSION: ICD-10-CM

## 2022-11-30 DIAGNOSIS — R10.2 PELVIC AND PERINEAL PAIN: ICD-10-CM

## 2022-11-30 DIAGNOSIS — Z01.419 ENCOUNTER FOR GYNECOLOGICAL EXAMINATION (GENERAL) (ROUTINE) W/OUT ABNORMAL FINDINGS: ICD-10-CM

## 2022-11-30 LAB
CULTURE RESULTS: NO GROWTH — SIGNIFICANT CHANGE UP
HIV 1+2 AB+HIV1 P24 AG SERPL QL IA: SIGNIFICANT CHANGE UP
SPECIMEN SOURCE: SIGNIFICANT CHANGE UP

## 2022-11-30 PROCEDURE — 90471 IMMUNIZATION ADMIN: CPT

## 2022-11-30 PROCEDURE — 86780 TREPONEMA PALLIDUM: CPT

## 2022-11-30 PROCEDURE — 99396 PREV VISIT EST AGE 40-64: CPT

## 2022-11-30 PROCEDURE — 90715 TDAP VACCINE 7 YRS/> IM: CPT

## 2022-11-30 PROCEDURE — G0463: CPT | Mod: 25

## 2022-11-30 PROCEDURE — 87086 URINE CULTURE/COLONY COUNT: CPT

## 2022-11-30 PROCEDURE — 87340 HEPATITIS B SURFACE AG IA: CPT

## 2022-11-30 PROCEDURE — G0463: CPT

## 2022-11-30 PROCEDURE — 87389 HIV-1 AG W/HIV-1&-2 AB AG IA: CPT

## 2022-11-30 PROCEDURE — 86803 HEPATITIS C AB TEST: CPT

## 2022-11-30 PROCEDURE — 36415 COLL VENOUS BLD VENIPUNCTURE: CPT

## 2022-11-30 NOTE — PHYSICAL EXAM
[Awake] : awake [Alert] : alert [Acute Distress] : no acute distress [Mass] : no breast mass [Nipple Discharge] : no nipple discharge [Axillary LAD] : no axillary lymphadenopathy [Soft] : soft [Tender] : non tender [Oriented x3] : oriented to person, place, and time [Depressed Mood] : not depressed [Flat Affect] : affect not flat [Vulvitis] : vulvitis [Normal] : uterus [Labia Majora] : labia major [Labia Minora] : labia minora [Discharge] : a  ~M vaginal discharge was present [Scant] : scant [Foul Smelling] : not foul smelling [White] : white [Thin] : thin [No Bleeding] : there was no active vaginal bleeding [Pap Obtained] : a Pap smear was performed [Motion Tenderness] : there was no cervical motion tenderness [Uterine Adnexae] : were not tender and not enlarged [de-identified] : skin color change of bilateral vulva [FreeTextEntry6] : no abnormalities noted

## 2022-11-30 NOTE — HISTORY OF PRESENT ILLNESS
[1 Year Ago] : 1 year ago [Last Pap ___] : Last cervical pap smear was [unfilled] [Reproductive Age] : is of reproductive age [HPV Vaccine NA Due to Age] : HPV vaccine not available to patient due to age [Localized] : a localized [Rt Vulva] : right vulva [Lt Vulva] : left vulva [FreeTextEntry9] : dryness [Definite:  ___ (Date)] : the last menstrual period was [unfilled] [Normal Amount/Duration] : was of a normal amount and duration [Spotting Between  Menses] : no spotting between menses [Regular Cycle Intervals] : periods have been regular [Menstrual Cramps] : no menstrual cramps

## 2022-12-01 ENCOUNTER — RESULT REVIEW (OUTPATIENT)
Age: 47
End: 2022-12-01

## 2022-12-01 ENCOUNTER — APPOINTMENT (OUTPATIENT)
Dept: MAMMOGRAPHY | Facility: IMAGING CENTER | Age: 47
End: 2022-12-01

## 2022-12-01 ENCOUNTER — OUTPATIENT (OUTPATIENT)
Dept: OUTPATIENT SERVICES | Facility: HOSPITAL | Age: 47
LOS: 1 days | End: 2022-12-01
Payer: MEDICAID

## 2022-12-01 DIAGNOSIS — Z01.419 ENCOUNTER FOR GYNECOLOGICAL EXAMINATION (GENERAL) (ROUTINE) WITHOUT ABNORMAL FINDINGS: ICD-10-CM

## 2022-12-01 LAB
HBV SURFACE AG SER-ACNC: SIGNIFICANT CHANGE UP
HCV AB S/CO SERPL IA: 0.19 S/CO — SIGNIFICANT CHANGE UP (ref 0–0.99)
HCV AB SERPL-IMP: SIGNIFICANT CHANGE UP
T PALLIDUM AB TITR SER: NEGATIVE — SIGNIFICANT CHANGE UP

## 2022-12-01 PROCEDURE — 77063 BREAST TOMOSYNTHESIS BI: CPT | Mod: 26

## 2022-12-01 PROCEDURE — 77063 BREAST TOMOSYNTHESIS BI: CPT

## 2022-12-01 PROCEDURE — 77067 SCR MAMMO BI INCL CAD: CPT

## 2022-12-01 PROCEDURE — 77067 SCR MAMMO BI INCL CAD: CPT | Mod: 26

## 2022-12-05 ENCOUNTER — APPOINTMENT (OUTPATIENT)
Dept: ULTRASOUND IMAGING | Facility: CLINIC | Age: 47
End: 2022-12-05

## 2022-12-05 ENCOUNTER — OUTPATIENT (OUTPATIENT)
Dept: OUTPATIENT SERVICES | Facility: HOSPITAL | Age: 47
LOS: 1 days | End: 2022-12-05
Payer: MEDICAID

## 2022-12-05 DIAGNOSIS — R10.2 PELVIC AND PERINEAL PAIN: ICD-10-CM

## 2022-12-05 PROCEDURE — 76856 US EXAM PELVIC COMPLETE: CPT

## 2022-12-05 PROCEDURE — 76856 US EXAM PELVIC COMPLETE: CPT | Mod: 26

## 2022-12-05 PROCEDURE — 76830 TRANSVAGINAL US NON-OB: CPT

## 2022-12-05 PROCEDURE — 76830 TRANSVAGINAL US NON-OB: CPT | Mod: 26

## 2022-12-07 ENCOUNTER — APPOINTMENT (OUTPATIENT)
Dept: INTERNAL MEDICINE | Facility: CLINIC | Age: 47
End: 2022-12-07

## 2022-12-07 ENCOUNTER — EMERGENCY (EMERGENCY)
Facility: HOSPITAL | Age: 47
LOS: 1 days | Discharge: HOME | End: 2022-12-07
Attending: EMERGENCY MEDICINE
Payer: MEDICAID

## 2022-12-07 VITALS
TEMPERATURE: 98 F | OXYGEN SATURATION: 97 % | RESPIRATION RATE: 20 BRPM | SYSTOLIC BLOOD PRESSURE: 142 MMHG | HEART RATE: 83 BPM | DIASTOLIC BLOOD PRESSURE: 79 MMHG

## 2022-12-07 PROBLEM — R05.3 COUGH, PERSISTENT: Status: ACTIVE | Noted: 2022-12-07

## 2022-12-07 LAB
ALBUMIN SERPL ELPH-MCNC: 3.7 G/DL — SIGNIFICANT CHANGE UP (ref 3.3–5)
ALP SERPL-CCNC: 86 U/L — SIGNIFICANT CHANGE UP (ref 40–120)
ALT FLD-CCNC: 13 U/L — SIGNIFICANT CHANGE UP (ref 10–45)
ANION GAP SERPL CALC-SCNC: 11 MMOL/L — SIGNIFICANT CHANGE UP (ref 5–17)
AST SERPL-CCNC: 13 U/L — SIGNIFICANT CHANGE UP (ref 10–40)
BASOPHILS # BLD AUTO: 0.03 K/UL — SIGNIFICANT CHANGE UP (ref 0–0.2)
BASOPHILS NFR BLD AUTO: 0.4 % — SIGNIFICANT CHANGE UP (ref 0–2)
BILIRUB SERPL-MCNC: 0.2 MG/DL — SIGNIFICANT CHANGE UP (ref 0.2–1.2)
BUN SERPL-MCNC: 10 MG/DL — SIGNIFICANT CHANGE UP (ref 7–23)
CALCIUM SERPL-MCNC: 9.3 MG/DL — SIGNIFICANT CHANGE UP (ref 8.4–10.5)
CHLORIDE SERPL-SCNC: 103 MMOL/L — SIGNIFICANT CHANGE UP (ref 96–108)
CO2 SERPL-SCNC: 25 MMOL/L — SIGNIFICANT CHANGE UP (ref 22–31)
CREAT SERPL-MCNC: 0.83 MG/DL — SIGNIFICANT CHANGE UP (ref 0.5–1.3)
EGFR: 87 ML/MIN/1.73M2 — SIGNIFICANT CHANGE UP
EOSINOPHIL # BLD AUTO: 0.43 K/UL — SIGNIFICANT CHANGE UP (ref 0–0.5)
EOSINOPHIL NFR BLD AUTO: 5.6 % — SIGNIFICANT CHANGE UP (ref 0–6)
FLUAV AG NPH QL: SIGNIFICANT CHANGE UP
FLUBV AG NPH QL: SIGNIFICANT CHANGE UP
GLUCOSE SERPL-MCNC: 93 MG/DL — SIGNIFICANT CHANGE UP (ref 70–99)
HCG SERPL-ACNC: <2 MIU/ML — SIGNIFICANT CHANGE UP
HCT VFR BLD CALC: 37.3 % — SIGNIFICANT CHANGE UP (ref 34.5–45)
HGB BLD-MCNC: 12.1 G/DL — SIGNIFICANT CHANGE UP (ref 11.5–15.5)
IMM GRANULOCYTES NFR BLD AUTO: 0.3 % — SIGNIFICANT CHANGE UP (ref 0–0.9)
LYMPHOCYTES # BLD AUTO: 2.37 K/UL — SIGNIFICANT CHANGE UP (ref 1–3.3)
LYMPHOCYTES # BLD AUTO: 30.6 % — SIGNIFICANT CHANGE UP (ref 13–44)
MCHC RBC-ENTMCNC: 27.1 PG — SIGNIFICANT CHANGE UP (ref 27–34)
MCHC RBC-ENTMCNC: 32.4 GM/DL — SIGNIFICANT CHANGE UP (ref 32–36)
MCV RBC AUTO: 83.6 FL — SIGNIFICANT CHANGE UP (ref 80–100)
MONOCYTES # BLD AUTO: 0.59 K/UL — SIGNIFICANT CHANGE UP (ref 0–0.9)
MONOCYTES NFR BLD AUTO: 7.6 % — SIGNIFICANT CHANGE UP (ref 2–14)
NEUTROPHILS # BLD AUTO: 4.3 K/UL — SIGNIFICANT CHANGE UP (ref 1.8–7.4)
NEUTROPHILS NFR BLD AUTO: 55.5 % — SIGNIFICANT CHANGE UP (ref 43–77)
NRBC # BLD: 0 /100 WBCS — SIGNIFICANT CHANGE UP (ref 0–0)
PLATELET # BLD AUTO: 265 K/UL — SIGNIFICANT CHANGE UP (ref 150–400)
POTASSIUM SERPL-MCNC: 4 MMOL/L — SIGNIFICANT CHANGE UP (ref 3.5–5.3)
POTASSIUM SERPL-SCNC: 4 MMOL/L — SIGNIFICANT CHANGE UP (ref 3.5–5.3)
PROT SERPL-MCNC: 8.1 G/DL — SIGNIFICANT CHANGE UP (ref 6–8.3)
RBC # BLD: 4.46 M/UL — SIGNIFICANT CHANGE UP (ref 3.8–5.2)
RBC # FLD: 11.7 % — SIGNIFICANT CHANGE UP (ref 10.3–14.5)
RSV RNA NPH QL NAA+NON-PROBE: SIGNIFICANT CHANGE UP
SARS-COV-2 RNA SPEC QL NAA+PROBE: SIGNIFICANT CHANGE UP
SODIUM SERPL-SCNC: 139 MMOL/L — SIGNIFICANT CHANGE UP (ref 135–145)
WBC # BLD: 7.74 K/UL — SIGNIFICANT CHANGE UP (ref 3.8–10.5)
WBC # FLD AUTO: 7.74 K/UL — SIGNIFICANT CHANGE UP (ref 3.8–10.5)

## 2022-12-07 PROCEDURE — 99285 EMERGENCY DEPT VISIT HI MDM: CPT

## 2022-12-07 PROCEDURE — 99213 OFFICE O/P EST LOW 20 MIN: CPT | Mod: GE,95

## 2022-12-07 PROCEDURE — 71045 X-RAY EXAM CHEST 1 VIEW: CPT | Mod: 26

## 2022-12-07 RX ORDER — AZITHROMYCIN 500 MG/1
500 TABLET, FILM COATED ORAL ONCE
Refills: 0 | Status: COMPLETED | OUTPATIENT
Start: 2022-12-07 | End: 2022-12-07

## 2022-12-07 RX ORDER — METOCLOPRAMIDE HCL 10 MG
10 TABLET ORAL ONCE
Refills: 0 | Status: COMPLETED | OUTPATIENT
Start: 2022-12-07 | End: 2022-12-07

## 2022-12-07 RX ORDER — ACETAMINOPHEN 500 MG
975 TABLET ORAL ONCE
Refills: 0 | Status: COMPLETED | OUTPATIENT
Start: 2022-12-07 | End: 2022-12-07

## 2022-12-07 RX ORDER — ONDANSETRON 8 MG/1
4 TABLET, FILM COATED ORAL ONCE
Refills: 0 | Status: COMPLETED | OUTPATIENT
Start: 2022-12-07 | End: 2022-12-07

## 2022-12-07 RX ORDER — SODIUM CHLORIDE 9 MG/ML
1000 INJECTION, SOLUTION INTRAVENOUS ONCE
Refills: 0 | Status: COMPLETED | OUTPATIENT
Start: 2022-12-07 | End: 2022-12-07

## 2022-12-07 RX ADMIN — ONDANSETRON 4 MILLIGRAM(S): 8 TABLET, FILM COATED ORAL at 20:28

## 2022-12-07 RX ADMIN — SODIUM CHLORIDE 1000 MILLILITER(S): 9 INJECTION, SOLUTION INTRAVENOUS at 23:20

## 2022-12-07 RX ADMIN — Medication 10 MILLIGRAM(S): at 23:20

## 2022-12-07 RX ADMIN — Medication 975 MILLIGRAM(S): at 20:28

## 2022-12-07 NOTE — ED PROVIDER NOTE - NS ED ROS FT
General: denies fever, chills  HENT: + nasal congestion, rhinorrhea  Eyes: denies visual changes, blurred vision  CV: + chest pain, denies palpitations  Resp: +cough  Abdominal: +nausea, denies vomiting, diarrhea, abdominal pain  : denies urinary pain or frequency  MSK: denies muscle aches, leg swelling  Neuro: +headaches, dizziness, denies weakness, numbness, tingling

## 2022-12-07 NOTE — HISTORY OF PRESENT ILLNESS
[Home] : at home, [unfilled] , at the time of the visit. [Medical Office: ([facility])___] : at the medical office located in  [Verbal consent obtained from patient] : the patient, [unfilled] [FreeTextEntry1] : Worsening headaches  [de-identified] : 47 year old female with history of Chiari I malformation with resulting recurrent headaches who calls with concern for worsening cough, chest pain, headaches, nausea. \par \par #Cough:\par -Has been ongoing for about a week. No smoking/tobacco use. With associated shortness of breath. Measured SpO2 with home pulse oximeter with 98%. Cough is not productive. Has coughing spells, usually drinking water helps soothe the itch it in her throat. Doesn't feel post-nasal drip. No sinus pressure, no stuffy nose. No history of asthma. No fevers objectively at home Tmax was 99.3 at home however states that she has felt chills this past week. No sick contacts. Youngest child is 12 years old who had a fever about a week ago stayed at home and now is ok. Beginning of the week patient had a sore throat, did salt water gargles which helped with the pain. No travel recently. Patient was around a lot people as recently held a personal  around  and was surrounded by lots of family and people from out of state. No further body aches, little bit of chills. Has not gotten her flu shot, took it the past two years and got sick after taking it and as a result given that reaction doesn't want to take it this year. COVID vaxxed with original series and one booster shot last year. Tylenol for body aches helped. \par \par #Headaches: \par -Similar to her previous headaches that she sees neurology for, now off duloxetine for headaches will restart soon.

## 2022-12-07 NOTE — ED PROVIDER NOTE - NS_ ATTENDINGSCRIBEDETAILS _ED_A_ED_FT
I reviewed the scribe's note and agree with the documented findings and plan of care.  Twila Navas MD denies pain/discomfort

## 2022-12-07 NOTE — ED PROVIDER NOTE - CLINICAL SUMMARY MEDICAL DECISION MAKING FREE TEXT BOX
46 yo f pmhx chiari malformation previously on duloxetine for HA which she stopped last month presents to ed w viral sx-abd pain, n, cough, HA. q doc'd for URI sx flu/covid/rsv negative and labs -, xray negative for consolidation, but on exam pt reporting severe headache w nausea and photophobia. no fnd on exam. will add ct head to eval for headache in setting of chiari malformation, but likely viral headache vs migraine. will treat w reglan and fluids. 48 yo f pmhx chiari malformation previously on duloxetine for HA which she stopped last month presents to ed w viral sx-abd pain, n, cough, HA. q doc'd for URI sx flu/covid/rsv negative and labs -, xray + RLL consolidation, but on exam pt reporting severe headache w nausea and photophobia. no fnd on exam. will add ct head to eval for headache in setting of chiari malformation, but likely viral headache vs migraine. will treat w reglan and fluids, and azithromycin for pneumonia

## 2022-12-07 NOTE — ED PROVIDER NOTE - PATIENT PORTAL LINK FT
You can access the FollowMyHealth Patient Portal offered by [facility] by registering at the following website: [email or web address]. By joining [facility]’s FollowMyHealth portal, you will also be able to view your health information using other applications (apps) compatible with our system.

## 2022-12-07 NOTE — END OF VISIT
[] : Resident [FreeTextEntry3] : 46yo F with PMhx of migraines who presents for cough following URI symptoms. No fevers or purulent cough to suggest PNA. Requesting testing, which is fair (may guide booster recommendations/isolation recs). Otherwise agree w/ supportive care

## 2022-12-07 NOTE — REVIEW OF SYSTEMS
[Chills] : chills [Fatigue] : fatigue [Sore Throat] : sore throat [Shortness Of Breath] : shortness of breath [Cough] : cough [Muscle Pain] : muscle pain [Negative] : Gastrointestinal [Nasal Discharge] : no nasal discharge [Postnasal Drip] : no postnasal drip

## 2022-12-07 NOTE — ED PROVIDER NOTE - NSFOLLOWUPINSTRUCTIONS_ED_ALL_ED_FT
You were seen in the ED and diagnosed with pneumonia. Please  the antibiotics from your pharmacy and take them as prescribed. Follow up with your primary care doctor. Return to the ED with worsening headache, fever not resolved with tylenol, chest pain or shortness of breath.

## 2022-12-07 NOTE — ED PROVIDER NOTE - ATTENDING CONTRIBUTION TO CARE
See MDM above.  Patient  understands anticipatory guidance and was given strict return and follow up precautions. The patient has been informed of all concerning signs and symptoms to return to Emergency Department, the necessity to follow up with PMD/Clinic/follow up provided within 2-3 days was explained, and the patient reports understanding of above with capacity and insight if patient disposition is to be home.

## 2022-12-07 NOTE — ED ADULT NURSE NOTE - OBJECTIVE STATEMENT
Pt is a 47y F c/o ha, nausea, dizziness, cough. Pt states she has cp when she coughs, body aches. Pt had sudden onset ha, weakness, and lightheaded ness today. Pt is A&OX3, breathing unlabored and spontaneous, full strength and ROM of all extremities. Pt resting in stretcher, bed in lowest position, educated on call bell, aware of plan of care. Comfort and safety measures maintained.

## 2022-12-07 NOTE — ED PROVIDER NOTE - PHYSICAL EXAMINATION
GENERAL: well appearing in no acute distress, non-toxic appearing  CARDIAC: regular rate and rhythm, normal S1S2, no appreciable murmurs  PULM: normal breath sounds, clear to ascultation bilaterally, no rales, rhonchi, wheezing  GI: abdomen nondistended, soft, nontender, no guarding, rebound tenderness  NEURO: no focal motor or sensory deficits  MSK: no peripheral edema, no calf tenderness b/l  SKIN: well-perfused, extremities warm, no visible rashes  PSYCH: appropriate mood and affect

## 2022-12-07 NOTE — ED PROVIDER NOTE - RAPID ASSESSMENT
46 y/o F, here for HA, cough, Nausea, and CP, earlier today in the morning. No fever, no chills. Currently menstruating.      Nery MEHTA) have documented this rapid assessment note under the dictation of Twila Navas) which has been reviewed and affirmed to be accurate. Patient was seen as a QDOC patient. The patient will be seen and further worked up in the main emergency department and their care will be completed by the main emergency department team along with a thorough physical exam. Receiving team will follow up on labs, analgesia, any clinical imaging, reassess and disposition as clinically indicated, all decisions regarding the progression of care will be made at their discretion.

## 2022-12-07 NOTE — ED PROVIDER NOTE - OBJECTIVE STATEMENT
48 y/o F, here for ha, cough, nausea, and dizziness. pt had abdominal pain the past wk, went ot obgyn thought was UTI, got urinalysis and TVUS which were negative. also has had cough and cp when she coughed, and body aches, no sick contact but says she has been around a lot of ppl who have traveled recently. today had sudden onset b/l ha associated w weakness and lightheadedness. says different from chiari headaches, but was wondering if it was secondary to stop taking duloextine which she was on for chiari HA. denies any weakness, numbness, tingling, vision changes.

## 2022-12-07 NOTE — ED PROVIDER NOTE - SHIFT CHANGE DETAILS
Junior Balbuena MD FACEP note of transfer at the usual time of sign out: Receiving team will follow up on labs, analgesia, any clinical imaging, reassess and disposition as clinically indicated.  Details of patient and plan conveyed to receiving physician and conveyed back for understanding.  There were no questions at this time about the patient's status, disposition, and plan. Patient's care to be taken over by receiving physician at this time, all decisions regarding the progression of care will be made at their discretion.

## 2022-12-07 NOTE — ASSESSMENT
[FreeTextEntry1] : 46 y/o woman calling office for concern for persistent cough. \par \par \par #Cough:\par -Appears to be post-viral in etiology. \par -RVP w/ COVID ordered\par -Tessalon Perles ordered\par -Denies sinus pain/pressure or post-nasal gtt will hold off on ordering flonase\par -No association with prandial routine will hold off on PPI. \par -Red flag features explained to patient including worsening shortness of breath, symptoms that last >4weeks, signs of hypoxia on pulse ox <88%. \par -If symptoms persistent to f/u in 1 month in office for in person evaluation, if symptoms resolving can f/u PRN for symptoms or in 2023 for CPE. \par \par Plan d/w Dr. Drummond and patient.

## 2022-12-08 ENCOUNTER — APPOINTMENT (OUTPATIENT)
Dept: OBGYN | Facility: CLINIC | Age: 47
End: 2022-12-08

## 2022-12-08 ENCOUNTER — APPOINTMENT (OUTPATIENT)
Dept: ULTRASOUND IMAGING | Facility: CLINIC | Age: 47
End: 2022-12-08

## 2022-12-08 VITALS
OXYGEN SATURATION: 98 % | TEMPERATURE: 98 F | HEART RATE: 72 BPM | RESPIRATION RATE: 18 BRPM | DIASTOLIC BLOOD PRESSURE: 73 MMHG | SYSTOLIC BLOOD PRESSURE: 109 MMHG

## 2022-12-08 LAB
APPEARANCE UR: CLEAR — SIGNIFICANT CHANGE UP
BACTERIA # UR AUTO: NEGATIVE — SIGNIFICANT CHANGE UP
BILIRUB UR-MCNC: NEGATIVE — SIGNIFICANT CHANGE UP
COLOR SPEC: COLORLESS — SIGNIFICANT CHANGE UP
DIFF PNL FLD: ABNORMAL
EPI CELLS # UR: 1 /HPF — SIGNIFICANT CHANGE UP
GLUCOSE UR QL: NEGATIVE — SIGNIFICANT CHANGE UP
HYALINE CASTS # UR AUTO: 0 /LPF — SIGNIFICANT CHANGE UP (ref 0–2)
KETONES UR-MCNC: NEGATIVE — SIGNIFICANT CHANGE UP
LEUKOCYTE ESTERASE UR-ACNC: NEGATIVE — SIGNIFICANT CHANGE UP
NITRITE UR-MCNC: NEGATIVE — SIGNIFICANT CHANGE UP
PH UR: 6 — SIGNIFICANT CHANGE UP (ref 5–8)
PROT UR-MCNC: NEGATIVE — SIGNIFICANT CHANGE UP
RBC CASTS # UR COMP ASSIST: 0 /HPF — SIGNIFICANT CHANGE UP (ref 0–4)
SP GR SPEC: 1 — LOW (ref 1.01–1.02)
UROBILINOGEN FLD QL: NEGATIVE — SIGNIFICANT CHANGE UP
WBC UR QL: 2 /HPF — SIGNIFICANT CHANGE UP (ref 0–5)

## 2022-12-08 PROCEDURE — 81001 URINALYSIS AUTO W/SCOPE: CPT

## 2022-12-08 PROCEDURE — 80053 COMPREHEN METABOLIC PANEL: CPT

## 2022-12-08 PROCEDURE — 70450 CT HEAD/BRAIN W/O DYE: CPT | Mod: 26,MA

## 2022-12-08 PROCEDURE — 96375 TX/PRO/DX INJ NEW DRUG ADDON: CPT

## 2022-12-08 PROCEDURE — 99284 EMERGENCY DEPT VISIT MOD MDM: CPT | Mod: 25

## 2022-12-08 PROCEDURE — 70450 CT HEAD/BRAIN W/O DYE: CPT | Mod: MA

## 2022-12-08 PROCEDURE — 71045 X-RAY EXAM CHEST 1 VIEW: CPT

## 2022-12-08 PROCEDURE — 36415 COLL VENOUS BLD VENIPUNCTURE: CPT

## 2022-12-08 PROCEDURE — 85025 COMPLETE CBC W/AUTO DIFF WBC: CPT

## 2022-12-08 PROCEDURE — 87637 SARSCOV2&INF A&B&RSV AMP PRB: CPT

## 2022-12-08 PROCEDURE — 96374 THER/PROPH/DIAG INJ IV PUSH: CPT

## 2022-12-08 PROCEDURE — 84702 CHORIONIC GONADOTROPIN TEST: CPT

## 2022-12-08 RX ORDER — AZITHROMYCIN 500 MG/1
1 TABLET, FILM COATED ORAL
Qty: 7 | Refills: 0
Start: 2022-12-08 | End: 2022-12-14

## 2022-12-08 RX ADMIN — AZITHROMYCIN 255 MILLIGRAM(S): 500 TABLET, FILM COATED ORAL at 00:01

## 2022-12-12 ENCOUNTER — APPOINTMENT (OUTPATIENT)
Dept: NEUROLOGY | Facility: CLINIC | Age: 47
End: 2022-12-12
Payer: MEDICAID

## 2022-12-12 PROCEDURE — 99443: CPT

## 2022-12-12 NOTE — DATA REVIEWED
[FreeTextEntry1] : MRI Brain & MRA Neck (5/15/19):\par - No acute intracranial abnormality\par - Chiari I malformation\par - No neck vessel abnormalities\par \par MRA Brain & MRI Cervical Spine (5/29/19):\par - No intracranial vessel abnormalities\par - Chiari I malformation without syrinx\par - Mild multilevel cervical spondylosis\par \par CT Head (4/28/19):\par - Low-lying cerebellar tonsils, 4-5 mm below level of foramen magnum\par \par CT Head ([facility] ED, 6/7/21):\par - No acute fracture or intracranial abnormalities\par - Chiari I malformation (stable)

## 2022-12-12 NOTE — PHYSICAL EXAM
[General Appearance - Alert] : alert [General Appearance - In No Acute Distress] : in no acute distress [General Appearance - Well Developed] : well developed [General Appearance - Well Nourished] : well nourished [Oriented To Time, Place, And Person] : oriented to person, place, and time [Impaired Insight] : insight and judgment were intact [Affect] : the affect was normal [Person] : oriented to person [Place] : oriented to place [Time] : oriented to time [Concentration Intact] : normal concentrating ability [Visual Intact] : visual attention was ~T not ~L decreased [Fluency] : fluency intact [Comprehension] : comprehension intact [Total Score ___ / 30] : the patient achieved a score of [unfilled] /30 [Date / Time ___ / 5] : date / time [unfilled] / 5 [Place ___ / 5] : place [unfilled] / 5 [Registration ___ / 3] : registration [unfilled] / 3 [Serial Sevens ___/5] : serial sevens [unfilled] / 5 [Naming 2 Objects ___ / 2] : naming two objects [unfilled] / 2 [Repeating a Sentence ___ / 1] : repeating a sentence [unfilled] / 1 [Writing a Sentence ___ / 1] : write sentence [unfilled] / 1 [3-stage Verbal Command ___ / 3] : three-stage verbal command [unfilled] / 3 [Written Command ___ / 1] : written command [unfilled] / 1 [Copy a Design ___ / 1] : copy a design [unfilled] / 1 [Recall ___ / 3] : recall [unfilled] / 3 [Cranial Nerves Optic (II)] : visual acuity intact bilaterally,  visual fields full to confrontation, pupils equal round and reactive to light [Cranial Nerves Oculomotor (III)] : extraocular motion intact [Cranial Nerves Trigeminal (V)] : facial sensation intact symmetrically [Cranial Nerves Facial (VII)] : face symmetrical [Cranial Nerves Vestibulocochlear (VIII)] : hearing was intact bilaterally [Cranial Nerves Glossopharyngeal (IX)] : tongue and palate midline [Cranial Nerves Accessory (XI - Cranial And Spinal)] : head turning and shoulder shrug symmetric [Cranial Nerves Hypoglossal (XII)] : there was no tongue deviation with protrusion [Motor Tone] : muscle tone was normal in all four extremities [Motor Strength] : muscle strength was normal in all four extremities [No Muscle Atrophy] : normal bulk in all four extremities [Motor Handedness Right-Handed] : the patient is right hand dominant [Sensation Tactile Decrease] : light touch was intact [Sensation Pain / Temperature Decrease] : pain and temperature was intact [Abnormal Walk] : normal gait [Balance] : balance was intact [Sclera] : the sclera and conjunctiva were normal [Extraocular Movements] : extraocular movements were intact [Outer Ear] : the ears and nose were normal in appearance [Hearing Threshold Finger Rub Not [place]] : hearing was normal [Oropharynx] : the oropharynx was normal [Neck Appearance] : the appearance of the neck was normal [Nail Clubbing] : no clubbing  or cyanosis of the fingernails [Skin Color & Pigmentation] : normal skin color and pigmentation [] : no rash [FreeTextEntry1] : Neurocognitive Examination Functionality Questionnaire\par \par Relationship: Self\par Frequency of interactions in the past month: Daily / Lives with\par \par Aldridge Index of [place] in Activities of Daily Living:\par 1. Bathing/Showerin\par 2. Dressin\par 3. Toiletin\par 4. Transferrin\par 5. Continence: 1\par 6: Feedin\par \par TOTAL: 6\par \par \par [place] Instrumental Activities of Daily Living:\par A. Ability to Use Telephone: 1\par B. Shoppin\par C. Food Preparation: 1\par D. Housekeepin (light daily tasks due to neck pain)\par E. Laundry: 1\par F. Transportation: 1\par G. Responsibility for Own Medications: 1\par H: Ability to Handle Finances: 1\par \par TOTAL: 8\par  [Paresis Pronator Drift Right-Sided] : no pronator drift on the right [Paresis Pronator Drift Left-Sided] : no pronator drift on the left [Motor Strength Upper Extremities Bilaterally] : strength was normal in both upper extremities [Motor Strength Lower Extremities Bilaterally] : strength was normal in both lower extremities [Romberg's Sign] : Romberg's sign was negtive [Allodynia] : no ~T allodynia present [Hyperesthesia] : no hyperesthesia [Past-pointing] : there was no past-pointing [Tremor] : no tremor present [Dysdiadochokinesia Bilaterally] : not present [Coordination - Dysmetria Impaired Finger-to-Nose Bilateral] : not present [Coordination - Dysmetria Impaired Heel-to-Shin Bilateral] : not present [FreeTextEntry4] : Immediate and 5-minute delayed recall: 3/3. [FreeTextEntry8] : Normal, narrow-based gait. Specialized gait testing deferred by patient.

## 2022-12-12 NOTE — HISTORY OF PRESENT ILLNESS
[FreeTextEntry1] : FROM 5/7/19:\par This is a 43-year-old right-handed woman who has had new dull headaches over the past month, radiating to the neck as a burning sensation, relieved by over-the-counter acetaminophen. On April 28, 2019, the headache became very severe, feeling like "squeezing" at both sides of the head, associated with nausea and and "weak" feeling in the eyes. She presented to the [facility] ED and had a CT Head that raised concern for low-lying cerebellar tonsils. She was treated with some pain medications and was discharged, although the same headache with nausea and "weak" eyes occurred last week. Even when she does not have a headache, she feels weak in the legs, especially when walking up and down stairs.\par \par FROM 8/19/19:\par The patient states that she since she started taking amitriptyline and PRN metoclopramide, her headaches and neck pain have decreased in frequency, but she still sometimes experiences sharp pains at the right side of her head and pressure in her neck, but this happens up to 1-2 times per week, and not every day as before. She notes that stress at home or at work tends to trigger or aggravate these aches. She finds that metoclopramide makes her tired during the day, so she does not take that often. Since the last clinic visit, she has also noticed some mild burning pain in both of her calves when she walks, but this has improved since she started taking magnesium 500mg daily.\par \par FROM 5/21/20:\par This appointment is conducted via real-time two-way audiovisual technology due to the current COVID-19 pandemic. Verbal consent for this encounter was received by the patient. The patient was located at her home address, and I was located in [place], [place]. The patient, now 44, notes that in March and April 2020, she had symptoms of lack of smell, lack of taste, cough, and fever, and thinks she and other relatives may have been infected with COVID-19, but they were not able to get tested for it. In the middle of all that, she stopped taking amitriptyline and PRN metoclopramide as back, and she has had recurrence in the past 4-5 days of posterior headache with burning sensation, neck pain, and radiation of pain to her right shoulder and right chest, described as a soreness. Even when she was taking the amitriptyline, she would experience excessive fatigue during the day after each 10mg nighttime dose, and therefore is not eager to resume it. She notes that she presented to the ED in December 2019 for lower abdominal pain and was diagnosed with urinary tract infection; she was treated with antibiotics and improved at home.\par \par FROM 8/21/20:\par Since the last clinic visit, the patient has not had severe pain, and had used cyclobenzaprine 5mg PO QHS since the last visit on 5/21/20. However, she has not needed to use it on a standing basis since then, and only uses it as needed. She recounts that she had one episode of left-sided chest discomfort over the course of one day since I last saw her, but it resolved without her seeking medical attention, and she attributes it to stress. Otherwise, she has not had any headache, neck pain, or other body pain.\par \par FROM 6/22/21:\par This appointment is conducted via real-time two-way audiovisual technology due to the current COVID-19 pandemic, and continued via telephone due to technical difficulties. Verbal consent for this encounter was received by the patient. The patient, now 45, was located at her home address, and I was located in [place], [place]. The patient has been inconsistent about taking the medications I recommended, and she has not had any significant headaches for most of the time since I last saw her on 8/21/20. On 6/6/21, she was playing with family at her home and fell backwards on concrete when she thought there was a chair present, and hit the back of her head. She had pain, swelling, and burning sensation at the back of her head, but no loss of consciousness or change in vision. She presented to the [facility] ED on 6/7/21, where a CT Head showed no acute fracture or intracranial abnormality, only a stable Chiari I malformation. Since then, she has had intermittent feelings of burning at the back of her head, although the swelling from her head injury has subsided.\par \par FROM 7/21/21:\par This appointment is conducted via real-time two-way audiovisual technology due to the current COVID-19 pandemic. Verbal consent for this encounter was received by the patient. The patient was located at her home address, and I was located in [place], [place]. Since our last appointment on 6/22/21, the patient has completed a methylprednisolone dose pack and has started amitriptyline 10mg PO QHS, although she has not further titrated this to 30mg PO QHS. She has also used ibuprofen 600mg as needed for breakthrough headaches. She has had improvement in the frequency and intensity of headaches and posterior head burning sensations since then.\par \par FROM 11/5/21:\par This appointment is conducted via real-time two-way audiovisual technology due to the current COVID-19 pandemic. Verbal consent for this encounter was received by the patient. The patient was located at the home address, and I was located in [place], [place]. Since the last appointment on 7/21/21, I spoke with the patient by telephone on 10/14/21, when she reported having a flare-up of posterior headaches with burning sensations occurring 3 times per week. At that point, she was taking amitriptyline 10mg instead of 30mg PO QHS because drowsiness during the following morning. She completed a methylprednisolone dose pack, and her headaches have resolved without recurrence. She currently takes amitriptyline 10mg every other night. She denies any new pain in the neck, shoulders, or arms. She reports having poorer vision than before, and has an appointment scheduled with an ophthalmologist for further evaluation and management.\par \par FROM 4/12/22:\par This appointment is conducted via real-time two-way audiovisual technology due to the current COVID-19 pandemic. Verbal consent for this encounter was received by the patient. The patient was located at her home address, and I was located in [place], [place]. The patient, now 46, has been doing well overall while on Amitriptyline 10-15 mg at 7pm each night, but since 3/26/22, she has stopped taking it because it makes her very tired and she has had to help take care of her son in the hospital. She has noticed new problems with decreased focus and short-term memory, which makes her worried about driving because she can forget directions.\par \par FROM 7/25/22:\par This appointment is conducted via real-time two-way audiovisual technology due to the current COVID-19 pandemic. Verbal consent for this encounter was received by the patient. The patient was located at her home address, and I was located in [place], [place]. She has been tolerating Duloxetine 20mg daily at bedtime without adverse effects. The patient states that she sometimes has pain at the back of the neck, especially when stressed or doing a lot of work, but otherwise has not had severe headaches as she had before. She presented to the [facility] ED on 7/1/22 for urinary tract infection with associated lower abdominal and back pain, and was discharged with a course of cephalexin. She states that her focus and short-term memory are still not like they used to be, and that she can have a short temper at times, but these are not as bad as they were when I last saw her on 4/12/22. She continues to have poor near vision, requiring reading glasses.\par \par FROM 10/24/22:\par This appointment is conducted via real-time two-way audiovisual technology due to the current COVID-19 pandemic. Verbal consent for this encounter was received by the patient. The patient was located at her home address, and I was located in [place], [place]. She states that she has not yet started taking Duloxetine 20mg Daily, and states that she has not had recent severe headaches.

## 2022-12-14 ENCOUNTER — NON-APPOINTMENT (OUTPATIENT)
Age: 47
End: 2022-12-14

## 2022-12-14 ENCOUNTER — APPOINTMENT (OUTPATIENT)
Dept: INTERNAL MEDICINE | Facility: CLINIC | Age: 47
End: 2022-12-14

## 2022-12-14 ENCOUNTER — OUTPATIENT (OUTPATIENT)
Dept: OUTPATIENT SERVICES | Facility: HOSPITAL | Age: 47
LOS: 1 days | End: 2022-12-14
Payer: MEDICAID

## 2022-12-14 DIAGNOSIS — R05.9 COUGH, UNSPECIFIED: ICD-10-CM

## 2022-12-14 DIAGNOSIS — I10 ESSENTIAL (PRIMARY) HYPERTENSION: ICD-10-CM

## 2022-12-14 PROCEDURE — G0463: CPT

## 2022-12-14 PROCEDURE — 99441: CPT

## 2022-12-14 NOTE — PLAN
[FreeTextEntry1] : #Cough: \par - Cough improved but given pt has been on Abx and steroids unclear which has helped the most. Given child was sick 1 week prior and pt picked it up after that, likely viral. Remains afebrile. No post-nasal drip. Pulse ox at goal. \par - pt s/p abx, would not extend\par - pt on day 2/6 of steroid regimen. advised pt she doesn't have to continue this- she noted she will take 2 more days and if not feeling better will dc\par - c/w benzonatate\par - advised supportive care- hydration, nutrition\par - if cough persists/worsens for over 4-6 weeks or gets new fever, would have pt come for in-person visit if available vs urgent care depending on symptoms.\par \par #HA:\par - likely 2/2 cough and underlying Chiari 1 malformation \par - c/w benzonatate\par - c/w duloxetine\par \par Case discussed with Dr. Stahl\par Yusuf Ta, PGY-3

## 2022-12-14 NOTE — PHYSICAL EXAM
[de-identified] : able to talk in complete senences without getting SOB. No cough during appt. VOice does not sounced hoarse.

## 2022-12-14 NOTE — HISTORY OF PRESENT ILLNESS
[Home] : at home, [unfilled] , at the time of the visit. [Medical Office: ([facility])___] : at the medical office located in  [Verbal consent obtained from patient] : the patient, [unfilled] [FreeTextEntry1] : f/u [de-identified] : 47y F PMHx Chiari 1 malformation p/w f/u. Pt had tele visit last week for cough. After the appt pt followed up with neurology and resumed her duloxetine. She was also prescribed a 3 day course of azithromycin and 6 day course of prednisone. She has finished the 3 days of azithromycin and noted that did not help. She has taken 2 days of the prednisone and notes it has also not helped and will take 2 more days of it and if it doesn’t make her feel any better she will dc it. She thinks the benzonatate helps her HA by reducing her cough, which she attributes as the trigger for her HA. Of note, she has a pulse ox and has been checking her oxygen at home- has been in the high 90s.

## 2022-12-16 DIAGNOSIS — R05.9 COUGH, UNSPECIFIED: ICD-10-CM

## 2022-12-20 ENCOUNTER — APPOINTMENT (OUTPATIENT)
Dept: INTERNAL MEDICINE | Facility: CLINIC | Age: 47
End: 2022-12-20

## 2022-12-20 ENCOUNTER — OUTPATIENT (OUTPATIENT)
Dept: OUTPATIENT SERVICES | Facility: HOSPITAL | Age: 47
LOS: 1 days | End: 2022-12-20
Payer: MEDICAID

## 2022-12-20 DIAGNOSIS — R05.9 COUGH, UNSPECIFIED: ICD-10-CM

## 2022-12-20 DIAGNOSIS — Z23 ENCOUNTER FOR IMMUNIZATION: ICD-10-CM

## 2022-12-20 PROCEDURE — 90656 IIV3 VACC NO PRSV 0.5 ML IM: CPT

## 2022-12-20 PROCEDURE — G0008: CPT

## 2022-12-20 PROCEDURE — 90686 IIV4 VACC NO PRSV 0.5 ML IM: CPT

## 2022-12-30 ENCOUNTER — APPOINTMENT (OUTPATIENT)
Dept: INTERNAL MEDICINE | Facility: CLINIC | Age: 47
End: 2022-12-30

## 2022-12-30 ENCOUNTER — APPOINTMENT (OUTPATIENT)
Dept: GASTROENTEROLOGY | Facility: CLINIC | Age: 47
End: 2022-12-30

## 2022-12-30 DIAGNOSIS — N76.0 ACUTE VAGINITIS: ICD-10-CM

## 2023-01-03 ENCOUNTER — NON-APPOINTMENT (OUTPATIENT)
Age: 48
End: 2023-01-03

## 2023-01-03 ENCOUNTER — OUTPATIENT (OUTPATIENT)
Dept: OUTPATIENT SERVICES | Facility: HOSPITAL | Age: 48
LOS: 1 days | End: 2023-01-03
Payer: MEDICAID

## 2023-01-03 ENCOUNTER — APPOINTMENT (OUTPATIENT)
Dept: INTERNAL MEDICINE | Facility: CLINIC | Age: 48
End: 2023-01-03
Payer: MEDICAID

## 2023-01-03 DIAGNOSIS — Z23 ENCOUNTER FOR IMMUNIZATION: ICD-10-CM

## 2023-01-03 DIAGNOSIS — R05.9 COUGH, UNSPECIFIED: ICD-10-CM

## 2023-01-03 PROCEDURE — G0010: CPT

## 2023-01-03 PROCEDURE — 90746 HEPB VACCINE 3 DOSE ADULT IM: CPT

## 2023-01-09 ENCOUNTER — APPOINTMENT (OUTPATIENT)
Dept: GASTROENTEROLOGY | Facility: CLINIC | Age: 48
End: 2023-01-09
Payer: MEDICAID

## 2023-01-09 VITALS
BODY MASS INDEX: 21.71 KG/M2 | RESPIRATION RATE: 12 BRPM | DIASTOLIC BLOOD PRESSURE: 72 MMHG | HEIGHT: 61 IN | SYSTOLIC BLOOD PRESSURE: 110 MMHG | WEIGHT: 115 LBS | HEART RATE: 74 BPM | OXYGEN SATURATION: 98 % | TEMPERATURE: 98 F

## 2023-01-09 DIAGNOSIS — G93.5 COMPRESSION OF BRAIN: ICD-10-CM

## 2023-01-09 DIAGNOSIS — Z12.11 ENCOUNTER FOR SCREENING FOR MALIGNANT NEOPLASM OF COLON: ICD-10-CM

## 2023-01-09 DIAGNOSIS — Z80.0 FAMILY HISTORY OF MALIGNANT NEOPLASM OF DIGESTIVE ORGANS: ICD-10-CM

## 2023-01-09 DIAGNOSIS — Z80.0 ENCOUNTER FOR SCREENING FOR MALIGNANT NEOPLASM OF COLON: ICD-10-CM

## 2023-01-09 PROCEDURE — 99204 OFFICE O/P NEW MOD 45 MIN: CPT

## 2023-01-09 NOTE — CONSULT LETTER
[Dear  ___] : Dear  [unfilled], [Consult Letter:] : I had the pleasure of evaluating your patient, [unfilled]. [( Thank you for referring [unfilled] for consultation for _____ )] : Thank you for referring [unfilled] for consultation for [unfilled] [Please see my note below.] : Please see my note below. [Consult Closing:] : Thank you very much for allowing me to participate in the care of this patient.  If you have any questions, please do not hesitate to contact me. [Sincerely,] : Sincerely, [FreeTextEntry3] : Ray Miner MD\par \par Gastroenterology\par [facility]\par [facility]\par \par

## 2023-01-09 NOTE — ASSESSMENT
[FreeTextEntry1] : MAGALIE SCHAFFER was advised to undergo colonoscopy to which she agreed. The procedure will be performed in [facility] \par [facility] with the assistance of an anesthesiologist. The patient was given a Suprep preparation prescription and understood the \par procedure as it was explained to her. She was given a booklet distributed by the [employer] of G[employer]intestinal\par  Endoscopy explaining the procedure in detail and she understood the risks of the procedure not limited to infection, bleeding,\par perforation or non- diagnosis of colorectal cancer. She was advised that she could not drive home, if she chooses to\par  receive sedation.\par \par Further diagnostic and treatment recommendations will be based upon the procedure and any biopsies, if they are taken.\par \par Thank you for allowing me to participate in this [facility] health care.\par \par , Best personal regards -- Don\par

## 2023-01-09 NOTE — HISTORY OF PRESENT ILLNESS
[FreeTextEntry1] : She is a 47-year-old asymptomatic female referred for a screening colonoscopy.  She has a strong family history of colon cancer specifically her mother and grandfather.  Her last colonoscopy was in 2014 which was normal.

## 2023-02-08 ENCOUNTER — APPOINTMENT (OUTPATIENT)
Dept: GASTROENTEROLOGY | Facility: AMBULATORY SURGERY CENTER | Age: 48
End: 2023-02-08
Payer: MEDICAID

## 2023-02-08 ENCOUNTER — RESULT REVIEW (OUTPATIENT)
Age: 48
End: 2023-02-08

## 2023-02-08 PROCEDURE — 45385 COLONOSCOPY W/LESION REMOVAL: CPT | Mod: 33

## 2023-02-21 ENCOUNTER — APPOINTMENT (OUTPATIENT)
Dept: INTERNAL MEDICINE | Facility: CLINIC | Age: 48
End: 2023-02-21
Payer: MEDICAID

## 2023-02-21 ENCOUNTER — OUTPATIENT (OUTPATIENT)
Dept: OUTPATIENT SERVICES | Facility: HOSPITAL | Age: 48
LOS: 1 days | End: 2023-02-21
Payer: MEDICAID

## 2023-02-21 DIAGNOSIS — I10 ESSENTIAL (PRIMARY) HYPERTENSION: ICD-10-CM

## 2023-02-21 PROCEDURE — G0463: CPT

## 2023-02-21 PROCEDURE — 99213 OFFICE O/P EST LOW 20 MIN: CPT | Mod: GE,95

## 2023-02-22 ENCOUNTER — APPOINTMENT (OUTPATIENT)
Dept: INTERNAL MEDICINE | Facility: CLINIC | Age: 48
End: 2023-02-22

## 2023-02-22 NOTE — ASSESSMENT
[FreeTextEntry1] : 47 year old woman with history of Chiari I malformation presenting for TEB complaining of cough, runny nose since last week.\par \par #Cough/congestion/rhinorrhea\par - Persistent cough with nasal congestion/rhinorrhea. Worse when she lays down flat, sounds like component of post-nasal drip\par - Likely represents viral URI, possibly COVID based on described history, possibly influenza despite having received vaccination\par - Patient described having body aches which have now been resolving, and reproducible chest pain upon coughing, less likely cardiac in origin\par - No fevers endorsed, clear phlegm when cough is productive, less likely to be bacterial in nature\par - Son tested positive for COVID on rapid on Monday, patient tested negative today on a rapid. Unsure whether patient may have had COVID and been the exposure leading to her son getting it. Could now possibly be out of window for rapid to detect, but she did get PCR which is pending result. Advised patient to mask up for at least 5-10 days when going out\par - If patient does have COVID, she does not have risk factors concerning for progression of COVID warranting use of Paxlovid\par - Sent prescription for 10 days of Benzonatate TID as needed for her cough and Flonase nasal spray for her rhinorrhea to be used for a week\par - Advised patient to keep up with her intake of fluids and can continue to treat herself symptomatically\par - Advised her to also keep checking her oxygen saturation and monitor her breathing for any worsening\par \par Patient advised to return to clinic as needed and if worsening with regard to her symptoms, or oxygenation/breathing status to call back the clinic or proceed to the ED\par \par Patient's visit and plan of care discussed with Dr. Rodriguez\par \par Gonzalo Black MD\par PGY1

## 2023-02-22 NOTE — REVIEW OF SYSTEMS
[Fatigue] : fatigue [Nasal Discharge] : nasal discharge [Cough] : cough [Fever] : no fever [Chills] : no chills [Night Sweats] : no night sweats [Pain] : no pain [Wheezing] : no wheezing [Abdominal Pain] : no abdominal pain [Nausea] : no nausea [Vomiting] : no vomiting [Dysuria] : no dysuria [Joint Pain] : no joint pain [Joint Stiffness] : no joint stiffness [Muscle Pain] : no muscle pain [Itching] : no itching [Headache] : no headache [Dizziness] : no dizziness

## 2023-02-22 NOTE — HISTORY OF PRESENT ILLNESS
[Home] : at home, [unfilled] , at the time of the visit. [Medical Office: ([facility])___] : at the medical office located in  [Verbal consent obtained from patient] : the patient, [unfilled] [FreeTextEntry8] : 47 year old woman with history of Chiari I malformation presenting for TEB complaining of cough, runny nose since last week. Runny nose with clear discharge and watery eyes since Thursday associated with Cough with clear phlegm, chest pain and headache. Cough worse on Saturday. No fevers, but did feel warm. No prior sick contacts she is aware of. Got the flu shot. No nausea and vomiting, no belly pain. Also endorsed feeling more tired.\par \par Feels like dry air and a tickle that then makes her cough.\par \par Chest pain when she coughs, and can reproduce the pain by pressing on her chest\par \par Had body aches initially, but they went away\par \par Son tested positive for COVID on a rapid test yesterday (Monday). Was at court on Friday, and patient was around him on Sunday\par \par Patient tested herself with a rapid today and was negative, but did have PCR sent, pending the result. Patient endorses being vaccinated against COVID with three shots, but not the most recent booster.\par \par Was not tested for the flu while at urgent care\par \par Doing nasal spray, honey, nyquil, salt water gargle\par \par Had previously been prescribed benzonatate for cough in the past which helped\par \par Cough is made worse by laying down flat\par \par Patient had pulse ox at home and was able to check her pulse and O2 saturation, which were 68 and 99%\par \par Patient endorses appetite has been OK and she has been making sure to have good intake of water

## 2023-03-01 ENCOUNTER — APPOINTMENT (OUTPATIENT)
Dept: GASTROENTEROLOGY | Facility: CLINIC | Age: 48
End: 2023-03-01
Payer: MEDICAID

## 2023-03-01 VITALS
BODY MASS INDEX: 21.9 KG/M2 | WEIGHT: 116 LBS | HEIGHT: 61 IN | DIASTOLIC BLOOD PRESSURE: 74 MMHG | SYSTOLIC BLOOD PRESSURE: 112 MMHG

## 2023-03-01 DIAGNOSIS — Z12.11 ENCOUNTER FOR SCREENING FOR MALIGNANT NEOPLASM OF COLON: ICD-10-CM

## 2023-03-01 DIAGNOSIS — J06.9 ACUTE UPPER RESPIRATORY INFECTION, UNSPECIFIED: ICD-10-CM

## 2023-03-01 DIAGNOSIS — Z80.0 ENCOUNTER FOR SCREENING FOR MALIGNANT NEOPLASM OF COLON: ICD-10-CM

## 2023-03-01 PROCEDURE — 99213 OFFICE O/P EST LOW 20 MIN: CPT

## 2023-03-01 NOTE — ASSESSMENT
[FreeTextEntry1] : Due to the size of the polyp being  10 mm in  diameter and her family history of colon cancer, her next screening \par colonoscopy should be in 3 years

## 2023-03-01 NOTE — HISTORY OF PRESENT ILLNESS
[FreeTextEntry1] : Recent colonoscopy revealed one 10 mm polyp which was removed.  The rest of the colon was normal.  Pathology was consistent with a tubular adenoma.  She is feeling well and has no complaints

## 2023-03-01 NOTE — CONSULT LETTER
[Dear  ___] : Dear  [unfilled], [Consult Letter:] : I had the pleasure of evaluating your patient, [unfilled]. [( Thank you for referring [unfilled] for consultation for _____ )] : Thank you for referring [unfilled] for consultation for [unfilled] [Please see my note below.] : Please see my note below. [Consult Closing:] : Thank you very much for allowing me to participate in the care of this patient.  If you have any questions, please do not hesitate to contact me. [Sincerely,] : Sincerely, [FreeTextEntry3] : Ray Miner MD\par \par Gastroenterology\par [facility]\par [facility]\par \par

## 2023-03-15 ENCOUNTER — APPOINTMENT (OUTPATIENT)
Dept: INTERNAL MEDICINE | Facility: CLINIC | Age: 48
End: 2023-03-15

## 2023-03-20 ENCOUNTER — OUTPATIENT (OUTPATIENT)
Dept: OUTPATIENT SERVICES | Facility: HOSPITAL | Age: 48
LOS: 1 days | End: 2023-03-20
Payer: MEDICAID

## 2023-03-20 ENCOUNTER — APPOINTMENT (OUTPATIENT)
Dept: INTERNAL MEDICINE | Facility: CLINIC | Age: 48
End: 2023-03-20
Payer: MEDICAID

## 2023-03-20 DIAGNOSIS — J06.9 ACUTE UPPER RESPIRATORY INFECTION, UNSPECIFIED: ICD-10-CM

## 2023-03-20 DIAGNOSIS — Z23 ENCOUNTER FOR IMMUNIZATION: ICD-10-CM

## 2023-03-20 PROCEDURE — 90746 HEPB VACCINE 3 DOSE ADULT IM: CPT

## 2023-03-20 PROCEDURE — ZZZZZ: CPT

## 2023-03-20 PROCEDURE — G0010: CPT

## 2023-03-30 ENCOUNTER — APPOINTMENT (OUTPATIENT)
Dept: NEUROLOGY | Facility: CLINIC | Age: 48
End: 2023-03-30
Payer: MEDICAID

## 2023-03-30 PROCEDURE — 99443: CPT

## 2023-03-30 RX ORDER — DULOXETINE HYDROCHLORIDE 20 MG/1
20 CAPSULE, DELAYED RELEASE PELLETS ORAL
Qty: 90 | Refills: 1 | Status: DISCONTINUED | COMMUNITY
Start: 2022-04-12 | End: 2023-03-30

## 2023-04-21 ENCOUNTER — APPOINTMENT (OUTPATIENT)
Dept: NEUROLOGY | Facility: CLINIC | Age: 48
End: 2023-04-21
Payer: MEDICAID

## 2023-04-21 PROCEDURE — 99443: CPT

## 2023-04-25 ENCOUNTER — NON-APPOINTMENT (OUTPATIENT)
Age: 48
End: 2023-04-25

## 2023-04-25 ENCOUNTER — EMERGENCY (EMERGENCY)
Facility: HOSPITAL | Age: 48
LOS: 1 days | Discharge: HOME | End: 2023-04-25
Attending: EMERGENCY MEDICINE
Payer: MEDICAID

## 2023-04-25 ENCOUNTER — APPOINTMENT (OUTPATIENT)
Dept: NEUROLOGY | Facility: CLINIC | Age: 48
End: 2023-04-25

## 2023-04-25 VITALS
DIASTOLIC BLOOD PRESSURE: 73 MMHG | SYSTOLIC BLOOD PRESSURE: 112 MMHG | OXYGEN SATURATION: 99 % | TEMPERATURE: 98 F | RESPIRATION RATE: 14 BRPM | HEART RATE: 81 BPM

## 2023-04-25 VITALS
OXYGEN SATURATION: 98 % | HEART RATE: 73 BPM | RESPIRATION RATE: 16 BRPM | DIASTOLIC BLOOD PRESSURE: 79 MMHG | HEIGHT: 59 IN | SYSTOLIC BLOOD PRESSURE: 137 MMHG | TEMPERATURE: 99 F | WEIGHT: 121.92 LBS

## 2023-04-25 LAB
ALBUMIN SERPL ELPH-MCNC: 4.3 G/DL — SIGNIFICANT CHANGE UP (ref 3.3–5)
ALP SERPL-CCNC: 75 U/L — SIGNIFICANT CHANGE UP (ref 40–120)
ALT FLD-CCNC: 17 U/L — SIGNIFICANT CHANGE UP (ref 10–45)
ANION GAP SERPL CALC-SCNC: 11 MMOL/L — SIGNIFICANT CHANGE UP (ref 5–17)
AST SERPL-CCNC: 25 U/L — SIGNIFICANT CHANGE UP (ref 10–40)
BASOPHILS # BLD AUTO: 0.05 K/UL — SIGNIFICANT CHANGE UP (ref 0–0.2)
BASOPHILS NFR BLD AUTO: 0.5 % — SIGNIFICANT CHANGE UP (ref 0–2)
BILIRUB SERPL-MCNC: 0.2 MG/DL — SIGNIFICANT CHANGE UP (ref 0.2–1.2)
BUN SERPL-MCNC: 15 MG/DL — SIGNIFICANT CHANGE UP (ref 7–23)
CALCIUM SERPL-MCNC: 9.7 MG/DL — SIGNIFICANT CHANGE UP (ref 8.4–10.5)
CHLORIDE SERPL-SCNC: 100 MMOL/L — SIGNIFICANT CHANGE UP (ref 96–108)
CO2 SERPL-SCNC: 26 MMOL/L — SIGNIFICANT CHANGE UP (ref 22–31)
CREAT SERPL-MCNC: 0.76 MG/DL — SIGNIFICANT CHANGE UP (ref 0.5–1.3)
EGFR: 97 ML/MIN/1.73M2 — SIGNIFICANT CHANGE UP
EOSINOPHIL # BLD AUTO: 0.04 K/UL — SIGNIFICANT CHANGE UP (ref 0–0.5)
EOSINOPHIL NFR BLD AUTO: 0.4 % — SIGNIFICANT CHANGE UP (ref 0–6)
GLUCOSE SERPL-MCNC: 75 MG/DL — SIGNIFICANT CHANGE UP (ref 70–99)
HCT VFR BLD CALC: 43.5 % — SIGNIFICANT CHANGE UP (ref 34.5–45)
HGB BLD-MCNC: 13.8 G/DL — SIGNIFICANT CHANGE UP (ref 11.5–15.5)
IMM GRANULOCYTES NFR BLD AUTO: 0.8 % — SIGNIFICANT CHANGE UP (ref 0–0.9)
LYMPHOCYTES # BLD AUTO: 2.28 K/UL — SIGNIFICANT CHANGE UP (ref 1–3.3)
LYMPHOCYTES # BLD AUTO: 21.2 % — SIGNIFICANT CHANGE UP (ref 13–44)
MCHC RBC-ENTMCNC: 27.3 PG — SIGNIFICANT CHANGE UP (ref 27–34)
MCHC RBC-ENTMCNC: 31.7 GM/DL — LOW (ref 32–36)
MCV RBC AUTO: 86 FL — SIGNIFICANT CHANGE UP (ref 80–100)
MONOCYTES # BLD AUTO: 0.62 K/UL — SIGNIFICANT CHANGE UP (ref 0–0.9)
MONOCYTES NFR BLD AUTO: 5.8 % — SIGNIFICANT CHANGE UP (ref 2–14)
NEUTROPHILS # BLD AUTO: 7.67 K/UL — HIGH (ref 1.8–7.4)
NEUTROPHILS NFR BLD AUTO: 71.3 % — SIGNIFICANT CHANGE UP (ref 43–77)
NRBC # BLD: 0 /100 WBCS — SIGNIFICANT CHANGE UP (ref 0–0)
PLATELET # BLD AUTO: 252 K/UL — SIGNIFICANT CHANGE UP (ref 150–400)
POTASSIUM SERPL-MCNC: 4.5 MMOL/L — SIGNIFICANT CHANGE UP (ref 3.5–5.3)
POTASSIUM SERPL-SCNC: 4.5 MMOL/L — SIGNIFICANT CHANGE UP (ref 3.5–5.3)
PROT SERPL-MCNC: 8.4 G/DL — HIGH (ref 6–8.3)
RBC # BLD: 5.06 M/UL — SIGNIFICANT CHANGE UP (ref 3.8–5.2)
RBC # FLD: 13.5 % — SIGNIFICANT CHANGE UP (ref 10.3–14.5)
SODIUM SERPL-SCNC: 137 MMOL/L — SIGNIFICANT CHANGE UP (ref 135–145)
WBC # BLD: 10.75 K/UL — HIGH (ref 3.8–10.5)
WBC # FLD AUTO: 10.75 K/UL — HIGH (ref 3.8–10.5)

## 2023-04-25 PROCEDURE — 93005 ELECTROCARDIOGRAM TRACING: CPT

## 2023-04-25 PROCEDURE — 36415 COLL VENOUS BLD VENIPUNCTURE: CPT

## 2023-04-25 PROCEDURE — 70450 CT HEAD/BRAIN W/O DYE: CPT | Mod: 26,MA

## 2023-04-25 PROCEDURE — 70450 CT HEAD/BRAIN W/O DYE: CPT | Mod: MA

## 2023-04-25 PROCEDURE — 80053 COMPREHEN METABOLIC PANEL: CPT

## 2023-04-25 PROCEDURE — 96374 THER/PROPH/DIAG INJ IV PUSH: CPT

## 2023-04-25 PROCEDURE — 99285 EMERGENCY DEPT VISIT HI MDM: CPT | Mod: 25

## 2023-04-25 PROCEDURE — 96375 TX/PRO/DX INJ NEW DRUG ADDON: CPT

## 2023-04-25 PROCEDURE — 85025 COMPLETE CBC W/AUTO DIFF WBC: CPT

## 2023-04-25 PROCEDURE — 99284 EMERGENCY DEPT VISIT MOD MDM: CPT

## 2023-04-25 RX ORDER — KETOROLAC TROMETHAMINE 30 MG/ML
15 SYRINGE (ML) INJECTION ONCE
Refills: 0 | Status: DISCONTINUED | OUTPATIENT
Start: 2023-04-25 | End: 2023-04-25

## 2023-04-25 RX ORDER — SODIUM CHLORIDE 9 MG/ML
1000 INJECTION, SOLUTION INTRAVENOUS ONCE
Refills: 0 | Status: COMPLETED | OUTPATIENT
Start: 2023-04-25 | End: 2023-04-25

## 2023-04-25 RX ORDER — METOCLOPRAMIDE HCL 10 MG
10 TABLET ORAL ONCE
Refills: 0 | Status: COMPLETED | OUTPATIENT
Start: 2023-04-25 | End: 2023-04-25

## 2023-04-25 RX ADMIN — SODIUM CHLORIDE 1000 MILLILITER(S): 9 INJECTION, SOLUTION INTRAVENOUS at 13:34

## 2023-04-25 RX ADMIN — Medication 10 MILLIGRAM(S): at 13:27

## 2023-04-25 RX ADMIN — Medication 15 MILLIGRAM(S): at 13:27

## 2023-04-25 NOTE — ED ADULT NURSE NOTE - OBJECTIVE STATEMENT
Patient is a 68 yo female A&Ox4 PMH Chiari malformation presenting to the ED from home for headache x1 week. Patient states that Chiari malformation dx 2019 and follows up with neuro. Patient states that she notified neuro on Friday and was prescribed gabapentin and prednisone. Patient reports generalized head pain, photophobia, and nausea. Patient denies SOB, fever/chills, chest pain, blurred vision, abd pain, vomiting/diarrhea. On exam, lung sounds clear bilat apices and bases, abd soft, nontender, nondistended.

## 2023-04-25 NOTE — ED PROVIDER NOTE - PROGRESS NOTE DETAILS
Edita Owens MD PGY2: Patient GARCIA improved with medication and patient appears well. CT head without acute change. Patient states she feels well and would like to follow-up with her neurologist tomorrow. Discussed plan with patient and she was given strict return precautions.

## 2023-04-25 NOTE — ED PROVIDER NOTE - NSFOLLOWUPINSTRUCTIONS_ED_ALL_ED_FT
You were seen in the ED for headache.    Continue to take your home medications as prescribed.  Follow-up with your neurologist tomorrow.     ***Return to the ED if you have any new or worsening symptoms such as numbness to one side, vomiting, difficulty walking, or any other concerning symptoms***
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
## 2023-04-25 NOTE — ED PROVIDER NOTE - NSFOLLOWUPCLINICS_GEN_ALL_ED_FT
[facility]  Neurology  [address] [place] [address] [address] [place] [address]  Phone: [phone]  Fax:

## 2023-04-25 NOTE — ED PROVIDER NOTE - ATTENDING CONTRIBUTION TO CARE
Hx: headache with photosensitivity, No diplopia, dysphagia, dysarthria, ataxia, focal weakness in arm or leg.     PE: well appearing, nontoxic, no meningeal signs, nonfocal neuro.    MDM: agree with resident's MDM, ct head, labs, sxs relief.

## 2023-04-25 NOTE — ED PROVIDER NOTE - PATIENT PORTAL LINK FT
You can access the FollowMyHealth Patient Portal offered by [facility] by registering at the following website: [email or web address]. By joining [email or web address]’s FollowMyHealth portal, you will also be able to view your health information using other applications (apps) compatible with our system.

## 2023-04-25 NOTE — ED PROVIDER NOTE - PHYSICAL EXAMINATION
On exam lungs clear to auscultation bilaterally heart.  moving all extremities, equal strength bilaterally, no sensation deficits cranial nerves intact

## 2023-04-25 NOTE — ED PROVIDER NOTE - CLINICAL SUMMARY MEDICAL DECISION MAKING FREE TEXT BOX
Patient is a 47-year-old female history of Chiari malformation presenting with headache. Most likely worsening migraine.  But with history of Chiari malformation will get CT scan to ensure no worsening structural changes.  Patient without persistent blurry vision low concern for IIH. Will give fluids, reglan, toradol, reassess. If HA persists, will contact neurology for further recommendations. Patient without FND at this time.

## 2023-04-27 ENCOUNTER — APPOINTMENT (OUTPATIENT)
Dept: NEUROLOGY | Facility: CLINIC | Age: 48
End: 2023-04-27
Payer: MEDICAID

## 2023-04-27 PROCEDURE — 99215 OFFICE O/P EST HI 40 MIN: CPT | Mod: 95

## 2023-04-27 RX ORDER — METHYLPREDNISOLONE 4 MG/1
4 TABLET ORAL
Qty: 1 | Refills: 0 | Status: DISCONTINUED | COMMUNITY
Start: 2021-06-22 | End: 2023-04-27

## 2023-05-08 ENCOUNTER — APPOINTMENT (OUTPATIENT)
Dept: MRI IMAGING | Facility: CLINIC | Age: 48
End: 2023-05-08

## 2023-05-08 ENCOUNTER — APPOINTMENT (OUTPATIENT)
Dept: NEUROLOGY | Facility: CLINIC | Age: 48
End: 2023-05-08
Payer: MEDICAID

## 2023-05-08 PROCEDURE — 99443: CPT

## 2023-05-11 ENCOUNTER — APPOINTMENT (OUTPATIENT)
Dept: NEUROLOGY | Facility: CLINIC | Age: 48
End: 2023-05-11

## 2023-05-11 ENCOUNTER — APPOINTMENT (OUTPATIENT)
Dept: PAIN MANAGEMENT | Facility: CLINIC | Age: 48
End: 2023-05-11
Payer: MEDICAID

## 2023-05-11 ENCOUNTER — APPOINTMENT (OUTPATIENT)
Dept: PAIN MANAGEMENT | Facility: CLINIC | Age: 48
End: 2023-05-11

## 2023-05-11 ENCOUNTER — NON-APPOINTMENT (OUTPATIENT)
Age: 48
End: 2023-05-11

## 2023-05-11 VITALS
DIASTOLIC BLOOD PRESSURE: 81 MMHG | HEIGHT: 59 IN | HEART RATE: 89 BPM | WEIGHT: 127 LBS | SYSTOLIC BLOOD PRESSURE: 131 MMHG | BODY MASS INDEX: 25.6 KG/M2

## 2023-05-11 PROCEDURE — 99215 OFFICE O/P EST HI 40 MIN: CPT

## 2023-05-11 NOTE — PHYSICAL EXAM
[FreeTextEntry1] : General appearance: Well nourished and with attention to grooming.No signs of distress. No signs of toxicity.\par Neck/spine: Examination of the cervical spine reveals normal range of motion in the neck, no midline tenderness, +  right cervical paraspinal muscle and trap spasm, tenderness with trigger point. , negative Spurling's bilaterally. Examination of the lumbar spine reveals normal range of motion including flexion, extension and lateral rotation. No midline tenderness, no paraspinal muscle tenderness, negative facet loading,  no tenderness of sciatic notch, no tenderness of bilateral greater trochanters, Negative JAVI, negative SLRT bilaterally.  \par CV: RRR.\par Skin: No rash.\par Musculoskeletal: No joint deformities, no scoliosis, lordosis, kyphosis, pes cavus or hammer toes.\par Extremities: No peripheral edema.\par Neurological exam:\par Mental status: Patient is alert, attentive and oriented X3. Speech is coherent and fluent without dysarthria or aphasia Affect normal.\par CN: Pupils were 6 mm and reactive to light. Extraocular movements were full. Visual fields are intact to confrontation. No nystagmus. There was no face, jaw, palate or tongue weakness or atrophy. Facial sensation was normal and symmetric. Hearing was grossly intact. Shoulder shrug was normal.\par Motor exam revealed normal bulk and tone. There is no evidence of atrophy or fasciculations. There is no pronator drift. Manual muscle testing revealed 5/5 strength throughout including neck flexion/extension and proximal and distal muscles of the arms and legs.\par Sensory exam demonstrated was normal to touch, pin, proprioception, and vibration in arms and legs. Romberg was negative.\par DTRs: 2+ b/l biceps, 2+ triceps, 2 + brachioradialis, 2+ patellar, and 2+ ankle reflexes. Plantar responses were flexor bilaterally. No clonus, Romero's or crossed adductor response.\par Coord: Normal finger to nose testing and rapid alternating movements.\par Gait: Normal gait. \par

## 2023-05-11 NOTE — ASSESSMENT
[FreeTextEntry1] : 46 y/o F with Pmhx Chiari I malformation headaches who presents for initial eval of worsening headaches, neck pain.  Patient with partial transient improvement with steroids and recently started on Gabapentin currently 200 mg 2x/day with mild AE - tired. On exam patient with tightness/spasm right cervical paraspinals, right traps with trigger points. \par \par MRI brain and cervical spine reports reviewed and discussed with patient on this visit. \par - Agree with continues use of Gabapentin as long as patient able to tolerate. \par -Diclofenac 75 mg bid with meals x 10-14 days then prn, \par -consider low dose tizanidine but will hold off given gabapentin, of note cyclobenzaprine previously casued grogginess. \par -Discussed local modalities including heat and topical, massage\par - Recommend PT\par -Consider TPI if sx persist \par -If unable to tolerate gabapentin can consider trial of Topamax.

## 2023-05-11 NOTE — HISTORY OF PRESENT ILLNESS
[FreeTextEntry1] : Ms. MAGALIE SCHAFFER is a 47 year old RH female with Pmhx Chiari I Malformation, chronic Migraines who was referred by Dr. Jovanni Martinez for the management of neck pain and headaches.  Since 2019 has been having intermittent headaches occurring 3-4x/month on average holocephalic 5-6/10 lasting a few hours, associated with photophobia, phonophobia, blurred vision.  She was taking Excedrin prn which helps.  Approximately 5-6 weeks ago patient developed worsening headaches occurring daily, intermittent and varying in intensity holocephalic 9/10  associated with photophobia, phonophobia, blurred vision, + nausea, no vomiting , + burning sensation head and ears.  Pain radiates to back of neck associated with neck stiffness and tightness.  She completed trial of prednisone x 10 days with only partial relief in sx. \par \par MRI brain 5/2/2023 - essentially unchanged compared to prior study with cerebellar tonsils extensing 7 mm below foramen magnum. \par \par MRI C spine  showed multilevel degenerative changes  C4-C7, small central disc protrusion and minimal impression on the ventral thecal sac w/out spinal canal stenosis or cord compression, C5-6 small asymmetric disc bulge osteophyte complex - mild spinal canal and left subarticular recess , mod foraminal stenosis and impingement of left C6 nerve root, no cord compression, C6-7 mod asymmetric disc bulge/ostyeophyte complex moderate spinal, right foraminal, mild left foraminal stenosis with impingement of B/L C7 nerve roots, minimal impression of the right ventral spinal cord w/out abn intramedullary signal. \par \par Single episode of numbness in B/L LE.  She has tried multiple prophylactic medications most recently Duloxetine x 5-6 months but felt tired while on it and d/c.  She was started on Gabapentin and now on 200 mg 2x/day but also making her sleepy.  Denies difficulty chewing, swallowing, changes to bowel or bladder. \par \par MAGALIE typically sleeps 6 hours per night. \par \par C section x 3 \par Allergy - Macrobid- chest tightness \par Prior meds include: Metoclopramide, Magnesium, Amitriptyline, cyclobenzaprine- AE tired, Duloxetine- groggy/sleepy, \par Current meds include:gabapentin 200mg 2x/day \par \par Social hx:  She is living with a partner and has 3 children 23/21/14 y/o.  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  She does not smoke or vape, denies etoh or illicit drug use.  \par \par \par

## 2023-05-30 ENCOUNTER — APPOINTMENT (OUTPATIENT)
Dept: PAIN MANAGEMENT | Facility: CLINIC | Age: 48
End: 2023-05-30
Payer: MEDICAID

## 2023-05-30 VITALS
WEIGHT: 128 LBS | BODY MASS INDEX: 25.8 KG/M2 | SYSTOLIC BLOOD PRESSURE: 132 MMHG | HEIGHT: 59 IN | HEART RATE: 71 BPM | DIASTOLIC BLOOD PRESSURE: 85 MMHG

## 2023-05-30 PROCEDURE — 99214 OFFICE O/P EST MOD 30 MIN: CPT | Mod: 25

## 2023-05-30 PROCEDURE — 20552 NJX 1/MLT TRIGGER POINT 1/2: CPT

## 2023-05-30 RX ORDER — GABAPENTIN 100 MG/1
100 CAPSULE ORAL TWICE DAILY
Qty: 180 | Refills: 4 | Status: ACTIVE | COMMUNITY
Start: 2023-03-30

## 2023-05-30 NOTE — HISTORY OF PRESENT ILLNESS
[FreeTextEntry1] : FROM 5/7/19:\par This is a 43-year-old right-handed woman who has had new dull headaches over the past month, radiating to the neck as a burning sensation, relieved by over-the-counter acetaminophen. On April 28, 2019, the headache became very severe, feeling like "squeezing" at both sides of the head, associated with nausea and and "weak" feeling in the eyes. She presented to the [facility] ED and had a CT Head that raised concern for low-lying cerebellar tonsils. She was treated with some pain medications and was discharged, although the same headache with nausea and "weak" eyes occurred last week. Even when she does not have a headache, she feels weak in the legs, especially when walking up and down stairs.\par \par FROM 8/19/19:\par The patient states that she since she started taking amitriptyline and PRN metoclopramide, her headaches and neck pain have decreased in frequency, but she still sometimes experiences sharp pains at the right side of her head and pressure in her neck, but this happens up to 1-2 times per week, and not every day as before. She notes that stress at home or at work tends to trigger or aggravate these aches. She finds that metoclopramide makes her tired during the day, so she does not take that often. Since the last clinic visit, she has also noticed some mild burning pain in both of her calves when she walks, but this has improved since she started taking magnesium 500mg daily.\par \par FROM 5/21/20:\par This appointment is conducted via real-time two-way audiovisual technology due to the current COVID-19 pandemic. Verbal consent for this encounter was received by the patient. The patient was located at her home address, and I was located in [place], [place]. The patient, now 44, notes that in March and April 2020, she had symptoms of lack of smell, lack of taste, cough, and fever, and thinks she and other relatives may have been infected with COVID-19, but they were not able to get tested for it. In the middle of all that, she stopped taking amitriptyline and PRN metoclopramide as back, and she has had recurrence in the past 4-5 days of posterior headache with burning sensation, neck pain, and radiation of pain to her right shoulder and right chest, described as a soreness. Even when she was taking the amitriptyline, she would experience excessive fatigue during the day after each 10mg nighttime dose, and therefore is not eager to resume it. She notes that she presented to the ED in December 2019 for lower abdominal pain and was diagnosed with urinary tract infection; she was treated with antibiotics and improved at home.\par \par FROM 8/21/20:\par Since the last clinic visit, the patient has not had severe pain, and had used cyclobenzaprine 5mg PO QHS since the last visit on 5/21/20. However, she has not needed to use it on a standing basis since then, and only uses it as needed. She recounts that she had one episode of left-sided chest discomfort over the course of one day since I last saw her, but it resolved without her seeking medical attention, and she attributes it to stress. Otherwise, she has not had any headache, neck pain, or other body pain.\par \par FROM 6/22/21:\par This appointment is conducted via real-time two-way audiovisual technology due to the current COVID-19 pandemic, and continued via telephone due to technical difficulties. Verbal consent for this encounter was received by the patient. The patient, now 45, was located at her home address, and I was located in [place], [place]. The patient has been inconsistent about taking the medications I recommended, and she has not had any significant headaches for most of the time since I last saw her on 8/21/20. On 6/6/21, she was playing with family at her home and fell backwards on concrete when she thought there was a chair present, and hit the back of her head. She had pain, swelling, and burning sensation at the back of her head, but no loss of consciousness or change in vision. She presented to the [facility] ED on 6/7/21, where a CT Head showed no acute fracture or intracranial abnormality, only a stable Chiari I malformation. Since then, she has had intermittent feelings of burning at the back of her head, although the swelling from her head injury has subsided.\par \par FROM 7/21/21:\par This appointment is conducted via real-time two-way audiovisual technology due to the current COVID-19 pandemic. Verbal consent for this encounter was received by the patient. The patient was located at her home address, and I was located in [place], [place]. Since our last appointment on 6/22/21, the patient has completed a methylprednisolone dose pack and has started amitriptyline 10mg PO QHS, although she has not further titrated this to 30mg PO QHS. She has also used ibuprofen 600mg as needed for breakthrough headaches. She has had improvement in the frequency and intensity of headaches and posterior head burning sensations since then.\par \par FROM 11/5/21:\par This appointment is conducted via real-time two-way audiovisual technology due to the current COVID-19 pandemic. Verbal consent for this encounter was received by the patient. The patient was located at the home address, and I was located in [place], [place]. Since the last appointment on 7/21/21, I spoke with the patient by telephone on 10/14/21, when she reported having a flare-up of posterior headaches with burning sensations occurring 3 times per week. At that point, she was taking amitriptyline 10mg instead of 30mg PO QHS because drowsiness during the following morning. She completed a methylprednisolone dose pack, and her headaches have resolved without recurrence. She currently takes amitriptyline 10mg every other night. She denies any new pain in the neck, shoulders, or arms. She reports having poorer vision than before, and has an appointment scheduled with an ophthalmologist for further evaluation and management.\par \par FROM 4/12/22:\par This appointment is conducted via real-time two-way audiovisual technology due to the current COVID-19 pandemic. Verbal consent for this encounter was received by the patient. The patient was located at her home address, and I was located in [place], [place]. The patient, now 46, has been doing well overall while on Amitriptyline 10-15 mg at 7pm each night, but since 3/26/22, she has stopped taking it because it makes her very tired and she has had to help take care of her son in the hospital. She has noticed new problems with decreased focus and short-term memory, which makes her worried about driving because she can forget directions.\par \par FROM 7/25/22:\par This appointment is conducted via real-time two-way audiovisual technology due to the current COVID-19 pandemic. Verbal consent for this encounter was received by the patient. The patient was located at her home address, and I was located in [place], [place]. She has been tolerating Duloxetine 20mg daily at bedtime without adverse effects. The patient states that she sometimes has pain at the back of the neck, especially when stressed or doing a lot of work, but otherwise has not had severe headaches as she had before. She presented to the [facility] ED on 7/1/22 for urinary tract infection with associated lower abdominal and back pain, and was discharged with a course of cephalexin. She states that her focus and short-term memory are still not like they used to be, and that she can have a short temper at times, but these are not as bad as they were when I last saw her on 4/12/22. She continues to have poor near vision, requiring reading glasses.\par \par FROM 10/24/22:\par This appointment is conducted via real-time two-way audiovisual technology due to the current COVID-19 pandemic. Verbal consent for this encounter was received by the patient. The patient was located at her home address, and I was located in [place], [place]. She states that she has not yet started taking Duloxetine 20mg Daily, and states that she has not had recent severe headaches.\par \par FROM 4/27/23:\par This appointment is conducted via real-time two-way audiovisual technology to accommodate an urgent follow-up appointment during the current COVID-19 pandemic. Verbal consent for this encounter was received by the patient. The patient was located at her home address, and I was located in [place], [place]. The patient, now 47, reports having recurrence of pressure-like pain at the back of her head and neck over at least the past week, despite taking Duloxetine 20mg PO Daily as previously prescribed.

## 2023-05-30 NOTE — ASSESSMENT
[FreeTextEntry1] : 46 y/o F with Pmhx Chiari I malformation headaches who presents for initial eval of worsening headaches, neck pain.  . On exam patient with tightness/spasm B/L cervical paraspinals and traps with trigger points, + tenderness left greater occipital nerve. Overall improving. \par \par -TPI performed today B/L traps without AE \par - Agree with continues use of Gabapentin as long as patient able to tolerate\par -Diclofenac 75 mg bid with meals and now prn \par -consider low dose tizanidine but will hold off given gabapentin, of note cyclobenzaprine previously caused grogginess. \par -Discussed local modalities including heat and topical, massage\par - continue PT\par \par

## 2023-05-30 NOTE — ASSESSMENT
[FreeTextEntry1] : 47 RHF with exacerbation of headaches and history of nausea and paresthesias in the setting of Chiari I malformation, with exacerbation of symptoms in the setting of post-traumatic headache, also with effects on attention and mood but without evidence of mild cognitive impairment.

## 2023-05-30 NOTE — DATA REVIEWED
[FreeTextEntry1] : MRI brain 5/2/2023 - essentially unchanged compared to prior study with cerebellar tonsils extending 7 mm below foramen magnum. \par \par MRI C spine  showed multilevel degenerative changes  C4-C7, small central disc protrusion and minimal impression on the ventral thecal sac w/out spinal canal stenosis or cord compression, C5-6 small asymmetric disc bulge osteophyte complex - mild spinal canal and left subarticular recess , mod foraminal stenosis and impingement of left C6 nerve root, no cord compression, C6-7 mod asymmetric disc bulge/ostyeophyte complex moderate spinal, right foraminal, mild left foraminal stenosis with impingement of B/L C7 nerve roots, minimal impression of the right ventral spinal cord w/out abn intramedullary signal.

## 2023-05-30 NOTE — PHYSICAL EXAM
[FreeTextEntry1] : General appearance: Well nourished and with attention to grooming.No signs of distress. No signs of toxicity.\par Neck/spine: Examination of the cervical spine reveals normal range of motion in the neck, no midline tenderness, +  B/L cervical paraspinal muscle and trap spasm, tenderness with trigger point. , negative Spurling's bilaterally. Examination of the lumbar spine reveals normal range of motion including flexion, extension and lateral rotation. No midline tenderness, no paraspinal muscle tenderness, negative facet loading,  no tenderness of sciatic notch, no tenderness of bilateral greater trochanters, Negative JAVI, negative SLRT bilaterally.  \par CV: RRR.\par Skin: No rash.\par Musculoskeletal: No joint deformities, no scoliosis, lordosis, kyphosis, pes cavus or hammer toes.\par Extremities: No peripheral edema.\par Neurological exam:\par Mental status: Patient is alert, attentive and oriented X3. Speech is coherent and fluent without dysarthria or aphasia Affect normal.\par CN: Pupils were 6 mm and reactive to light. Extraocular movements were full. Visual fields are intact to confrontation. No nystagmus. There was no face, jaw, palate or tongue weakness or atrophy. Facial sensation was normal and symmetric. Hearing was grossly intact. Shoulder shrug was normal.\par Motor exam revealed normal bulk and tone. There is no evidence of atrophy or fasciculations. There is no pronator drift. Manual muscle testing revealed 5/5 strength throughout including neck flexion/extension and proximal and distal muscles of the arms and legs.\par Sensory exam demonstrated was normal to touch, pin, proprioception, and vibration in arms and legs. Romberg was negative.\par DTRs: 2+ b/l biceps, 2+ triceps, 2 + brachioradialis, 2+ patellar, and 2+ ankle reflexes. Plantar responses were flexor bilaterally. No clonus, Romero's or crossed adductor response.\par Coord: Normal finger to nose testing and rapid alternating movements.\par Gait: Normal gait. \par

## 2023-05-30 NOTE — PHYSICAL EXAM
[General Appearance - Alert] : alert [General Appearance - In No Acute Distress] : in no acute distress [General Appearance - Well Nourished] : well nourished [General Appearance - Well Developed] : well developed [Oriented To Time, Place, And Person] : oriented to person, place, and time [Impaired Insight] : insight and judgment were intact [Affect] : the affect was normal [Person] : oriented to person [Place] : oriented to place [Concentration Intact] : normal concentrating ability [Time] : oriented to time [Visual Intact] : visual attention was ~T not ~L decreased [Fluency] : fluency intact [Comprehension] : comprehension intact [Cranial Nerves Optic (II)] : visual acuity intact bilaterally,  visual fields full to confrontation, pupils equal round and reactive to light [Cranial Nerves Trigeminal (V)] : facial sensation intact symmetrically [Cranial Nerves Oculomotor (III)] : extraocular motion intact [Cranial Nerves Facial (VII)] : face symmetrical [Cranial Nerves Vestibulocochlear (VIII)] : hearing was intact bilaterally [Cranial Nerves Glossopharyngeal (IX)] : tongue and palate midline [Cranial Nerves Accessory (XI - Cranial And Spinal)] : head turning and shoulder shrug symmetric [Cranial Nerves Hypoglossal (XII)] : there was no tongue deviation with protrusion [Motor Tone] : muscle tone was normal in all four extremities [Motor Strength] : muscle strength was normal in all four extremities [No Muscle Atrophy] : normal bulk in all four extremities [Motor Handedness Right-Handed] : the patient is right hand dominant [Sensation Tactile Decrease] : light touch was intact [Sensation Pain / Temperature Decrease] : pain and temperature was intact [Abnormal Walk] : normal gait [Balance] : balance was intact [Sclera] : the sclera and conjunctiva were normal [Extraocular Movements] : extraocular movements were intact [Outer Ear] : the ears and nose were normal in appearance [Hearing Threshold Finger Rub [place]] : hearing was normal [Oropharynx] : the oropharynx was normal [Neck Appearance] : the appearance of the neck was normal [Nail Clubbing] : no clubbing  or cyanosis of the fingernails [Skin Color & Pigmentation] : normal skin color and pigmentation [] : no rash [FreeTextEntry1] : Neurocognitive Examination Functionality Questionnaire\par \par Relationship: Self\par Frequency of interactions in the past month: Daily / Lives with\par \par [place] Index of [place] in Activities of Daily Living:\par 1. Bathing/Showerin\par 2. Dressin\par 3. Toiletin\par 4. Transferrin\par 5. Continence: 1\par 6: Feedin\par \par TOTAL: 6\par \par \par [place] Instrumental Activities of Daily Living:\par A. Ability to Use Telephone: 1\par B. Shoppin\par C. Food Preparation: 1\par D. Housekeepin (light daily tasks due to neck pain)\par E. Laundry: 1\par F. Transportation: 1\par G. Responsibility for Own Medications: 1\par H: Ability to Handle Finances: 1\par \par TOTAL: 8\par  [Paresis Pronator Drift Right-Sided] : no pronator drift on the right [Paresis Pronator Drift Left-Sided] : no pronator drift on the left [Motor Strength Upper Extremities Bilaterally] : strength was normal in both upper extremities [Motor Strength Lower Extremities Bilaterally] : strength was normal in both lower extremities [Romberg's Sign] : Romberg's sign was negtive [Allodynia] : no ~T allodynia present [Hyperesthesia] : no hyperesthesia [Past-pointing] : there was no past-pointing [Tremor] : no tremor present [Dysdiadochokinesia Bilaterally] : not present [Coordination - Dysmetria Impaired Finger-to-Nose Bilateral] : not present [Coordination - Dysmetria Impaired Heel-to-Shin Bilateral] : not present [FreeTextEntry4] : Immediate and 5-minute delayed recall: 3/3. [FreeTextEntry8] : Normal, narrow-based gait. Specialized gait testing deferred by patient.

## 2023-05-30 NOTE — DATA REVIEWED
[FreeTextEntry1] : MRI Brain & MRA Neck (5/15/19):\par - No acute intracranial abnormality\par - Chiari I malformation\par - No neck vessel abnormalities\par \par MRA Brain & MRI Cervical Spine (5/29/19):\par - No intracranial vessel abnormalities\par - Chiari I malformation without syrinx\par - Mild multilevel cervical spondylosis\par \par CT Head (4/28/19):\par - Low-lying cerebellar tonsils, 4-5 mm below level of foramen magnum\par \par CT Head ([facility], 6/7/21):\par - No acute fracture or intracranial abnormalities\par - Chiari I malformation (stable)

## 2023-05-30 NOTE — HISTORY OF PRESENT ILLNESS
[FreeTextEntry1] : 47 year old RH female with Pmhx Chiari I Malformation, chronic Migraines who was referred by Dr. Jovanni Martinez for the management of neck pain and headaches, onset of headaches 2019  intermittent headaches occurring 3-4x/month on average holocephalic 5-6/10 lasting a few hours, associated with photophobia, phonophobia, blurred vision, worsened 6 weeks ago to daily. Since her initial visit she reports improvement in headaches and now reports HA 2x/week less holocephalic  6/10 on average  associated with photophobia, phonophobia, blurred vision, + nausea, no vomiting , + burning sensation head and ears.  She has started PT - twice thus far which she feels is helpful.  \par Neck pain stiffness/tightness improving.  \par \par  Allergy - Macrobid- chest tightness \par Prior meds include: Metoclopramide, Magnesium, Amitriptyline, cyclobenzaprine- AE tired, Duloxetine- groggy/sleepy, \par Current meds include:gabapentin 200mg 3x/day, Diclofenac 75 mg bid \par \par Social hx:  She is living with a partner and has 3 children 23/21/13 y/o.  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  She does not smoke or vape, denies etoh or illicit drug use.  \par \par \par

## 2023-06-06 ENCOUNTER — APPOINTMENT (OUTPATIENT)
Dept: INTERNAL MEDICINE | Facility: CLINIC | Age: 48
End: 2023-06-06
Payer: MEDICAID

## 2023-06-06 PROCEDURE — ZZZZZ: CPT

## 2023-06-15 ENCOUNTER — APPOINTMENT (OUTPATIENT)
Dept: NEUROLOGY | Facility: CLINIC | Age: 48
End: 2023-06-15
Payer: MEDICAID

## 2023-06-15 DIAGNOSIS — M54.2 CERVICALGIA: ICD-10-CM

## 2023-06-15 DIAGNOSIS — R51.9 HEADACHE, UNSPECIFIED: ICD-10-CM

## 2023-06-15 PROCEDURE — 99215 OFFICE O/P EST HI 40 MIN: CPT | Mod: 95

## 2023-06-23 ENCOUNTER — INPATIENT (INPATIENT)
Facility: HOSPITAL | Age: 48
LOS: 0 days | Discharge: HOME | DRG: 444 | End: 2023-06-24
Attending: SURGERY | Admitting: SURGERY
Payer: MEDICAID

## 2023-06-23 VITALS
RESPIRATION RATE: 20 BRPM | HEIGHT: 59 IN | SYSTOLIC BLOOD PRESSURE: 143 MMHG | TEMPERATURE: 99 F | HEART RATE: 82 BPM | DIASTOLIC BLOOD PRESSURE: 89 MMHG | WEIGHT: 130.07 LBS | OXYGEN SATURATION: 98 %

## 2023-06-23 DIAGNOSIS — K81.0 ACUTE CHOLECYSTITIS: ICD-10-CM

## 2023-06-23 LAB
ALBUMIN SERPL ELPH-MCNC: 4.2 G/DL — SIGNIFICANT CHANGE UP (ref 3.3–5)
ALP SERPL-CCNC: 77 U/L — SIGNIFICANT CHANGE UP (ref 40–120)
ALT FLD-CCNC: 23 U/L — SIGNIFICANT CHANGE UP (ref 10–45)
ANION GAP SERPL CALC-SCNC: 9 MMOL/L — SIGNIFICANT CHANGE UP (ref 5–17)
APPEARANCE UR: CLEAR — SIGNIFICANT CHANGE UP
APTT BLD: 31 SEC — SIGNIFICANT CHANGE UP (ref 27.5–35.5)
AST SERPL-CCNC: 25 U/L — SIGNIFICANT CHANGE UP (ref 10–40)
BACTERIA # UR AUTO: ABNORMAL
BASOPHILS # BLD AUTO: 0.04 K/UL — SIGNIFICANT CHANGE UP (ref 0–0.2)
BASOPHILS NFR BLD AUTO: 0.4 % — SIGNIFICANT CHANGE UP (ref 0–2)
BILIRUB SERPL-MCNC: 0.2 MG/DL — SIGNIFICANT CHANGE UP (ref 0.2–1.2)
BILIRUB UR-MCNC: NEGATIVE — SIGNIFICANT CHANGE UP
BUN SERPL-MCNC: 6 MG/DL — LOW (ref 7–23)
CALCIUM SERPL-MCNC: 9.4 MG/DL — SIGNIFICANT CHANGE UP (ref 8.4–10.5)
CHLORIDE SERPL-SCNC: 102 MMOL/L — SIGNIFICANT CHANGE UP (ref 96–108)
CO2 SERPL-SCNC: 29 MMOL/L — SIGNIFICANT CHANGE UP (ref 22–31)
COLOR SPEC: COLORLESS — SIGNIFICANT CHANGE UP
CREAT SERPL-MCNC: 0.76 MG/DL — SIGNIFICANT CHANGE UP (ref 0.5–1.3)
DIFF PNL FLD: NEGATIVE — SIGNIFICANT CHANGE UP
EGFR: 97 ML/MIN/1.73M2 — SIGNIFICANT CHANGE UP
EOSINOPHIL # BLD AUTO: 0.45 K/UL — SIGNIFICANT CHANGE UP (ref 0–0.5)
EOSINOPHIL NFR BLD AUTO: 4.4 % — SIGNIFICANT CHANGE UP (ref 0–6)
EPI CELLS # UR: 4 /HPF — SIGNIFICANT CHANGE UP
GLUCOSE SERPL-MCNC: 91 MG/DL — SIGNIFICANT CHANGE UP (ref 70–99)
GLUCOSE UR QL: NEGATIVE — SIGNIFICANT CHANGE UP
HCT VFR BLD CALC: 43.7 % — SIGNIFICANT CHANGE UP (ref 34.5–45)
HGB BLD-MCNC: 13.9 G/DL — SIGNIFICANT CHANGE UP (ref 11.5–15.5)
IMM GRANULOCYTES NFR BLD AUTO: 0.3 % — SIGNIFICANT CHANGE UP (ref 0–0.9)
INR BLD: 0.96 RATIO — SIGNIFICANT CHANGE UP (ref 0.88–1.16)
KETONES UR-MCNC: NEGATIVE — SIGNIFICANT CHANGE UP
LEUKOCYTE ESTERASE UR-ACNC: NEGATIVE — SIGNIFICANT CHANGE UP
LIDOCAIN IGE QN: 31 U/L — SIGNIFICANT CHANGE UP (ref 7–60)
LYMPHOCYTES # BLD AUTO: 2.23 K/UL — SIGNIFICANT CHANGE UP (ref 1–3.3)
LYMPHOCYTES # BLD AUTO: 21.7 % — SIGNIFICANT CHANGE UP (ref 13–44)
MCHC RBC-ENTMCNC: 27.6 PG — SIGNIFICANT CHANGE UP (ref 27–34)
MCHC RBC-ENTMCNC: 31.8 GM/DL — LOW (ref 32–36)
MCV RBC AUTO: 86.7 FL — SIGNIFICANT CHANGE UP (ref 80–100)
MONOCYTES # BLD AUTO: 0.6 K/UL — SIGNIFICANT CHANGE UP (ref 0–0.9)
MONOCYTES NFR BLD AUTO: 5.8 % — SIGNIFICANT CHANGE UP (ref 2–14)
NEUTROPHILS # BLD AUTO: 6.95 K/UL — SIGNIFICANT CHANGE UP (ref 1.8–7.4)
NEUTROPHILS NFR BLD AUTO: 67.4 % — SIGNIFICANT CHANGE UP (ref 43–77)
NITRITE UR-MCNC: NEGATIVE — SIGNIFICANT CHANGE UP
NRBC # BLD: 0 /100 WBCS — SIGNIFICANT CHANGE UP (ref 0–0)
PH UR: 8.5 — HIGH (ref 5–8)
PLATELET # BLD AUTO: 235 K/UL — SIGNIFICANT CHANGE UP (ref 150–400)
POTASSIUM SERPL-MCNC: 3.8 MMOL/L — SIGNIFICANT CHANGE UP (ref 3.5–5.3)
POTASSIUM SERPL-SCNC: 3.8 MMOL/L — SIGNIFICANT CHANGE UP (ref 3.5–5.3)
PROT SERPL-MCNC: 7.9 G/DL — SIGNIFICANT CHANGE UP (ref 6–8.3)
PROT UR-MCNC: NEGATIVE — SIGNIFICANT CHANGE UP
PROTHROM AB SERPL-ACNC: 11.1 SEC — SIGNIFICANT CHANGE UP (ref 10.5–13.4)
RBC # BLD: 5.04 M/UL — SIGNIFICANT CHANGE UP (ref 3.8–5.2)
RBC # FLD: 13.4 % — SIGNIFICANT CHANGE UP (ref 10.3–14.5)
RBC CASTS # UR COMP ASSIST: 1 /HPF — SIGNIFICANT CHANGE UP (ref 0–4)
SODIUM SERPL-SCNC: 140 MMOL/L — SIGNIFICANT CHANGE UP (ref 135–145)
SP GR SPEC: 1.01 — LOW (ref 1.01–1.02)
UROBILINOGEN FLD QL: NEGATIVE — SIGNIFICANT CHANGE UP
WBC # BLD: 10.3 K/UL — SIGNIFICANT CHANGE UP (ref 3.8–10.5)
WBC # FLD AUTO: 10.3 K/UL — SIGNIFICANT CHANGE UP (ref 3.8–10.5)
WBC UR QL: 2 /HPF — SIGNIFICANT CHANGE UP (ref 0–5)

## 2023-06-23 PROCEDURE — 76705 ECHO EXAM OF ABDOMEN: CPT | Mod: 26

## 2023-06-23 PROCEDURE — 99222 1ST HOSP IP/OBS MODERATE 55: CPT

## 2023-06-23 PROCEDURE — 74177 CT ABD & PELVIS W/CONTRAST: CPT | Mod: 26,MA

## 2023-06-23 PROCEDURE — 99285 EMERGENCY DEPT VISIT HI MDM: CPT

## 2023-06-23 RX ORDER — SODIUM CHLORIDE 9 MG/ML
1000 INJECTION INTRAMUSCULAR; INTRAVENOUS; SUBCUTANEOUS ONCE
Refills: 0 | Status: COMPLETED | OUTPATIENT
Start: 2023-06-23 | End: 2023-06-23

## 2023-06-23 RX ORDER — SODIUM CHLORIDE 9 MG/ML
1000 INJECTION, SOLUTION INTRAVENOUS
Refills: 0 | Status: DISCONTINUED | OUTPATIENT
Start: 2023-06-23 | End: 2023-06-24

## 2023-06-23 RX ORDER — ENOXAPARIN SODIUM 100 MG/ML
40 INJECTION SUBCUTANEOUS EVERY 24 HOURS
Refills: 0 | Status: DISCONTINUED | OUTPATIENT
Start: 2023-06-23 | End: 2023-06-24

## 2023-06-23 RX ORDER — PIPERACILLIN AND TAZOBACTAM 4; .5 G/20ML; G/20ML
3.38 INJECTION, POWDER, LYOPHILIZED, FOR SOLUTION INTRAVENOUS EVERY 8 HOURS
Refills: 0 | Status: DISCONTINUED | OUTPATIENT
Start: 2023-06-23 | End: 2023-06-23

## 2023-06-23 RX ORDER — PIPERACILLIN AND TAZOBACTAM 4; .5 G/20ML; G/20ML
3.38 INJECTION, POWDER, LYOPHILIZED, FOR SOLUTION INTRAVENOUS ONCE
Refills: 0 | Status: COMPLETED | OUTPATIENT
Start: 2023-06-23 | End: 2023-06-23

## 2023-06-23 RX ORDER — GABAPENTIN 400 MG/1
2 CAPSULE ORAL
Refills: 0 | DISCHARGE

## 2023-06-23 RX ORDER — ACETAMINOPHEN 500 MG
1000 TABLET ORAL ONCE
Refills: 0 | Status: COMPLETED | OUTPATIENT
Start: 2023-06-23 | End: 2023-06-24

## 2023-06-23 RX ORDER — ERTAPENEM SODIUM 1 G/1
1000 INJECTION, POWDER, LYOPHILIZED, FOR SOLUTION INTRAMUSCULAR; INTRAVENOUS ONCE
Refills: 0 | Status: COMPLETED | OUTPATIENT
Start: 2023-06-23 | End: 2023-06-23

## 2023-06-23 RX ORDER — ERTAPENEM SODIUM 1 G/1
INJECTION, POWDER, LYOPHILIZED, FOR SOLUTION INTRAMUSCULAR; INTRAVENOUS
Refills: 0 | Status: DISCONTINUED | OUTPATIENT
Start: 2023-06-23 | End: 2023-06-24

## 2023-06-23 RX ORDER — ACETAMINOPHEN 500 MG
1000 TABLET ORAL ONCE
Refills: 0 | Status: COMPLETED | OUTPATIENT
Start: 2023-06-23 | End: 2023-06-23

## 2023-06-23 RX ORDER — ERTAPENEM SODIUM 1 G/1
1000 INJECTION, POWDER, LYOPHILIZED, FOR SOLUTION INTRAMUSCULAR; INTRAVENOUS EVERY 24 HOURS
Refills: 0 | Status: DISCONTINUED | OUTPATIENT
Start: 2023-06-24 | End: 2023-06-24

## 2023-06-23 RX ADMIN — Medication 400 MILLIGRAM(S): at 17:22

## 2023-06-23 RX ADMIN — SODIUM CHLORIDE 1000 MILLILITER(S): 9 INJECTION INTRAMUSCULAR; INTRAVENOUS; SUBCUTANEOUS at 17:22

## 2023-06-23 RX ADMIN — PIPERACILLIN AND TAZOBACTAM 200 GRAM(S): 4; .5 INJECTION, POWDER, LYOPHILIZED, FOR SOLUTION INTRAVENOUS at 20:16

## 2023-06-23 RX ADMIN — ERTAPENEM SODIUM 120 MILLIGRAM(S): 1 INJECTION, POWDER, LYOPHILIZED, FOR SOLUTION INTRAMUSCULAR; INTRAVENOUS at 23:29

## 2023-06-23 NOTE — H&P ADULT - HISTORY OF PRESENT ILLNESS
Patient is a 47y old  Female who presents with a chief complaint of abdominal pain    HPI:  47 year old female with hx of , chiari malformation, presents with abdominal pain x 1 day. Reports epigastric pain started this morning, woke her up from sleep. Never had similar pain before. Denies fever, chills, nausea, vomiting, CP or SOB.     ROS: 10-system review is otherwise negative except HPI above.

## 2023-06-23 NOTE — H&P ADULT - ATTENDING COMMENTS
ATTENDING ATTESTATION  I have seen and examined this patient with the resident housestaftf. I have reviewed all labs, imaging and reports. I have participated in formulating the plan, and have read and agree with the history, ROS, exam, assessment and plan as stated above.     Dx: acute cholecystitis with cholelithiasis  Presents with epigastric pain for 1 day.   WBC and lft's normal.   US: GB wall thickening with pericholecystic fluid and a non-mobile stone in the GB neck. CBD normal 2mm.   Plan for admission. Recommending lap rohini, however, patient has Anglican preferences for no surgery, and would like trial of abx prior to committing to surgery.     Total time spent in the care of this patient today (excluding critical care, teaching, & procedures): 55 min                 Over 50% of the total time was spent on counseling and coordination of care.     Yamileth Gilbert M.D., M.S.  Dept of Trauma, Emergency General Surgery and Critical Care

## 2023-06-23 NOTE — H&P ADULT - NSHPLABSRESULTS_GEN_ALL_CORE
Vital Signs Last 24 Hrs  T(C): 36.7 (23 Jun 2023 19:19), Max: 37.2 (23 Jun 2023 13:46)  T(F): 98 (23 Jun 2023 19:19), Max: 98.9 (23 Jun 2023 13:46)  HR: 62 (23 Jun 2023 19:19) (62 - 82)  BP: 122/67 (23 Jun 2023 19:19) (122/67 - 143/89)  BP(mean): 86 (23 Jun 2023 19:19) (86 - 86)  RR: 18 (23 Jun 2023 19:19) (18 - 20)  SpO2: 99% (23 Jun 2023 19:19) (98% - 99%)    Exam:  Gen: NAD, resting in bed, alert and responding appropriately  Resp: Airway patent, non-labored respirations  Abd: Soft, ND, NRUQ TTP, no rebound or guarding  Ext: No edema, WWP  Neuro: AAOx3, no focal deficits    LABS:                        13.9   10.30 )-----------( 235      ( 23 Jun 2023 17:07 )             43.7     06-23    140  |  102  |  6<L>  ----------------------------<  91  3.8   |  29  |  0.76    Ca    9.4      23 Jun 2023 17:07    TPro  7.9  /  Alb  4.2  /  TBili  0.2  /  DBili  x   /  AST  25  /  ALT  23  /  AlkPhos  77  06-23    PT/INR - ( 23 Jun 2023 17:07 )   PT: 11.1 sec;   INR: 0.96 ratio         PTT - ( 23 Jun 2023 17:07 )  PTT:31.0 sec  Urinalysis Basic - ( 23 Jun 2023 17:07 )    Color: x / Appearance: x / SG: x / pH: x  Gluc: 91 mg/dL / Ketone: x  / Bili: x / Urobili: x   Blood: x / Protein: x / Nitrite: x   Leuk Esterase: x / RBC: x / WBC x   Sq Epi: x / Non Sq Epi: x / Bacteria: x    < from: CT Abdomen and Pelvis w/ IV Cont (06.23.23 @ 16:55) >    FINDINGS:  LOWER CHEST: Within normal limits.    LIVER: Within normal limits.  BILE DUCTS: Normal caliber.  GALLBLADDER: Moderately distended gallbladder with pericholecystic edema,   wall thickening and cholelithiasis, concerning for acute cholecystitis.  SPLEEN: Within normal limits.  PANCREAS: Within normal limits.  ADRENALS: Within normal limits.  KIDNEYS/URETERS: No hydronephrosis. 2 mm nonobstructing right upper pole   stone. Subcentimeter hypoattenuating foci bilaterally, too small to   accurately characterize    BLADDER: Within normal limits.  REPRODUCTIVE ORGANS: Uterus within normal limits. Fallopian tube   occlusion devices bilaterally. 2.7 cm left ovarian cyst.    BOWEL: No bowel obstruction. Appendix is normal.  PERITONEUM: No ascites.  VESSELS: Within normal limits.  RETROPERITONEUM/LYMPH NODES: No lymphadenopathy.  ABDOMINAL WALL: Within normal limits.  BONES: Within normal limits.    IMPRESSION:  Moderately distended gallbladder with pericholecystic edema, wall   thickening and cholelithiasis, concerning for acute cholecystitis.   Correlate with LFTs and pending abdominal ultrasound.      < end of copied text >    < from: US Abdomen Upper Quadrant Right (06.23.23 @ 20:33) >    IMPRESSION:  Nonmobile gallstone in the gallbladder neck.  Positive sonographic Wasserman   sign.  The findings are suspicious foracute cholecystitis in the proper   clinical scenario, but are of uncertain significance in the setting of   normal liver function tests and absent leukocytosis.  A follow-up nuclear   medicine HIDA scan may be obtained as clinically warranted.      < end of copied text >

## 2023-06-23 NOTE — ED ADULT TRIAGE NOTE - PAIN RATING/NUMBER SCALE (0-10): ACTIVITY
7 (severe pain)
Detail Level: Detailed
Introduction Text (Please End With A Colon): The following procedure was deferred:
Procedure To Be Performed At Next Visit: Intralesional Kenalog

## 2023-06-23 NOTE — ED PROVIDER NOTE - ATTENDING CONTRIBUTION TO CARE
Attending MD Orosco:  I performed a history and physical exam of the patient and discussed their management with the resident. I reviewed the resident's note and agree with the documented findings and plan of care. My medical decision making and observations are found above.

## 2023-06-23 NOTE — ED PROVIDER NOTE - OBJECTIVE STATEMENT
HPI & ROS:  47-year-old female with a history of migraines and Chiari malformation presenting with epigastric pain rating around to the right side of her lower flank.  this is happened in the past with a negative work-up, she was unsure of where.  On chart review, this is reveals her first visit here and she has no other MRN in the system.  No fevers no chills.  Did not take any pain medicine for this.  Last period was approximately 2 weeks ago.  Is sexually active.  no dysuria no polyuria.  not related to eating.  Normal bowel movement earlier today.  No diarrhea.

## 2023-06-23 NOTE — H&P ADULT - ASSESSMENT
47F with acute cholecystitis    Plan;  - Admit to surgery, Dr. Gilbert  - NPO, IVF  - IV antibiotics  - Discussed with pt regarding surgical management. Patient deferred surgery at this time for Orthodox reason and pending discussion with family. Would like trial of antibiotics first  - discussed with attending    Lily Hillman, PGY4  ACS  [id number]

## 2023-06-23 NOTE — ED ADULT NURSE REASSESSMENT NOTE - NS ED NURSE REASSESS COMMENT FT1
Report received from RN Rosa, pt found in position of comfort in [employer] 55.5. AOx4, MAEx4, respirations even and unlabored, abd soft nondistended, skin warm dry and normal for race. Pt states that her abdominal pain is improved and no longer tender to touch but still endorsing right sided flank pain. MD made aware. Patient safety maintained, bed is in lowest position, wheels locked, and side rails raised. Pt pending US at this time.

## 2023-06-23 NOTE — ED PROVIDER NOTE - PHYSICAL EXAMINATION
Exam as stated below:   CONSTITUTIONAL: In NAD.   SKIN: Warm dry. No rashes noted.   HEAD: NCAT.   EYES: No scleral icterus. Conjunctiva pink.  ENT: Posterior pharynx with no erythema.  NECK: No ttp.    CARD: RRR. No murmurs.  RESP: Clear to ausculation b/l. No Crackles noted. No Wheezing noted.  ABD:   Slightly tender in between her right upper and her right lower quadrant.  Negative Wasserman sign.  Negative Rovsing sign.  Negative obturator sign.  MSK: No pedal edema. No calf tenderness.  NEURO: UE/LE grossly intact. Motor UE/LE sensation grossly intact. CN II-XII grossly intact.   PSYCH: Cooperative, appropriate.

## 2023-06-23 NOTE — ED PROVIDER NOTE - CLINICAL SUMMARY MEDICAL DECISION MAKING FREE TEXT BOX
Attending MD Orosco.  Agree with above.  Pt is a 48 yo fem with no sig pmhx other than migraines/chiari malf presenting to ED with R lateral/epigstric pain radiating around to flank not worsened with food, no assoc dysuria/polyuria.  Pain upper right to lower right quadrant.  Has been seen previously with similar complaint with neg w/u.  Planned labs, urine. DDx:  vital signs stable, no fever at home.  Patient with periumbilical pain radiating to her right flank, with a large differential including hepatic etiology versus Layne versus less likely cholangitis, versus appendicitis versus stone.  no shortness of breath or chest pain to suggest cardiac or pulmonary etiology.  Not hypoxic not hypoxemic.   Labs: CBC CMP urine dipstick, PT PTT type and screen  lipase  Imaging:  CT abdomen pelvis   Tx:   Supportive at this time, there is no infection to treat in front of me given the vital signs and exam  Consults/Resources: likely not   Dispo: pending     Triage note reviewed. VS reviewed. EKG reviewed and documented in "RESULTS" section, if possible at given time.     DDx in MDM includes the most likely ddx, but is not limited to solely what is listed. Progress notes written as needed, and are included in "PROGRESS NOTE" section below.       Medical, family, and social determinants of health reviewed and discussed w/ pt/family/caretaker, when allowable, and is incorporated into note above, whenever possible.      Attending MD Orosco.  Agree with above.  Pt is a 46 yo fem with no sig pmhx other than migraines/chiari malf presenting to ED with R lateral/epigstric pain radiating around to flank not worsened with food, no assoc dysuria/polyuria.  Pain upper right to lower right quadrant.  Has been seen previously with similar complaint with neg w/u.  Planned labs, urine.

## 2023-06-24 ENCOUNTER — TRANSCRIPTION ENCOUNTER (OUTPATIENT)
Age: 48
End: 2023-06-24

## 2023-06-24 VITALS
TEMPERATURE: 98 F | DIASTOLIC BLOOD PRESSURE: 78 MMHG | RESPIRATION RATE: 18 BRPM | OXYGEN SATURATION: 97 % | HEART RATE: 68 BPM | SYSTOLIC BLOOD PRESSURE: 125 MMHG

## 2023-06-24 LAB
ALBUMIN SERPL ELPH-MCNC: 3.3 G/DL — SIGNIFICANT CHANGE UP (ref 3.3–5)
ALP SERPL-CCNC: 65 U/L — SIGNIFICANT CHANGE UP (ref 40–120)
ALT FLD-CCNC: 17 U/L — SIGNIFICANT CHANGE UP (ref 10–45)
ANION GAP SERPL CALC-SCNC: 8 MMOL/L — SIGNIFICANT CHANGE UP (ref 5–17)
AST SERPL-CCNC: 22 U/L — SIGNIFICANT CHANGE UP (ref 10–40)
BILIRUB SERPL-MCNC: 0.4 MG/DL — SIGNIFICANT CHANGE UP (ref 0.2–1.2)
BUN SERPL-MCNC: 6 MG/DL — LOW (ref 7–23)
CALCIUM SERPL-MCNC: 8.7 MG/DL — SIGNIFICANT CHANGE UP (ref 8.4–10.5)
CHLORIDE SERPL-SCNC: 106 MMOL/L — SIGNIFICANT CHANGE UP (ref 96–108)
CO2 SERPL-SCNC: 23 MMOL/L — SIGNIFICANT CHANGE UP (ref 22–31)
CREAT SERPL-MCNC: 0.78 MG/DL — SIGNIFICANT CHANGE UP (ref 0.5–1.3)
EGFR: 94 ML/MIN/1.73M2 — SIGNIFICANT CHANGE UP
GLUCOSE SERPL-MCNC: 84 MG/DL — SIGNIFICANT CHANGE UP (ref 70–99)
HCT VFR BLD CALC: 38 % — SIGNIFICANT CHANGE UP (ref 34.5–45)
HGB BLD-MCNC: 12.2 G/DL — SIGNIFICANT CHANGE UP (ref 11.5–15.5)
MAGNESIUM SERPL-MCNC: 2.1 MG/DL — SIGNIFICANT CHANGE UP (ref 1.6–2.6)
MCHC RBC-ENTMCNC: 27.5 PG — SIGNIFICANT CHANGE UP (ref 27–34)
MCHC RBC-ENTMCNC: 32.1 GM/DL — SIGNIFICANT CHANGE UP (ref 32–36)
MCV RBC AUTO: 85.8 FL — SIGNIFICANT CHANGE UP (ref 80–100)
NRBC # BLD: 0 /100 WBCS — SIGNIFICANT CHANGE UP (ref 0–0)
PHOSPHATE SERPL-MCNC: 2.3 MG/DL — LOW (ref 2.5–4.5)
PLATELET # BLD AUTO: 179 K/UL — SIGNIFICANT CHANGE UP (ref 150–400)
POTASSIUM SERPL-MCNC: 4.5 MMOL/L — SIGNIFICANT CHANGE UP (ref 3.5–5.3)
POTASSIUM SERPL-SCNC: 4.5 MMOL/L — SIGNIFICANT CHANGE UP (ref 3.5–5.3)
PROT SERPL-MCNC: 6.3 G/DL — SIGNIFICANT CHANGE UP (ref 6–8.3)
RBC # BLD: 4.43 M/UL — SIGNIFICANT CHANGE UP (ref 3.8–5.2)
RBC # FLD: 13.5 % — SIGNIFICANT CHANGE UP (ref 10.3–14.5)
SODIUM SERPL-SCNC: 137 MMOL/L — SIGNIFICANT CHANGE UP (ref 135–145)
WBC # BLD: 7.27 K/UL — SIGNIFICANT CHANGE UP (ref 3.8–10.5)
WBC # FLD AUTO: 7.27 K/UL — SIGNIFICANT CHANGE UP (ref 3.8–10.5)

## 2023-06-24 PROCEDURE — 87086 URINE CULTURE/COLONY COUNT: CPT

## 2023-06-24 PROCEDURE — 84100 ASSAY OF PHOSPHORUS: CPT

## 2023-06-24 PROCEDURE — 80053 COMPREHEN METABOLIC PANEL: CPT

## 2023-06-24 PROCEDURE — 83690 ASSAY OF LIPASE: CPT

## 2023-06-24 PROCEDURE — 85025 COMPLETE CBC W/AUTO DIFF WBC: CPT

## 2023-06-24 PROCEDURE — 96375 TX/PRO/DX INJ NEW DRUG ADDON: CPT

## 2023-06-24 PROCEDURE — G0378: CPT

## 2023-06-24 PROCEDURE — 85027 COMPLETE CBC AUTOMATED: CPT

## 2023-06-24 PROCEDURE — 85610 PROTHROMBIN TIME: CPT

## 2023-06-24 PROCEDURE — 96374 THER/PROPH/DIAG INJ IV PUSH: CPT

## 2023-06-24 PROCEDURE — 76705 ECHO EXAM OF ABDOMEN: CPT

## 2023-06-24 PROCEDURE — 74177 CT ABD & PELVIS W/CONTRAST: CPT | Mod: MA

## 2023-06-24 PROCEDURE — 81001 URINALYSIS AUTO W/SCOPE: CPT

## 2023-06-24 PROCEDURE — 99285 EMERGENCY DEPT VISIT HI MDM: CPT | Mod: 25

## 2023-06-24 PROCEDURE — 83735 ASSAY OF MAGNESIUM: CPT

## 2023-06-24 PROCEDURE — 36415 COLL VENOUS BLD VENIPUNCTURE: CPT

## 2023-06-24 PROCEDURE — 85730 THROMBOPLASTIN TIME PARTIAL: CPT

## 2023-06-24 RX ORDER — GABAPENTIN 400 MG/1
200 CAPSULE ORAL
Refills: 0 | Status: DISCONTINUED | OUTPATIENT
Start: 2023-06-24 | End: 2023-06-24

## 2023-06-24 RX ORDER — GABAPENTIN 400 MG/1
300 CAPSULE ORAL
Refills: 0 | Status: DISCONTINUED | OUTPATIENT
Start: 2023-06-24 | End: 2023-06-24

## 2023-06-24 RX ORDER — SODIUM,POTASSIUM PHOSPHATES 278-250MG
2 POWDER IN PACKET (EA) ORAL ONCE
Refills: 0 | Status: DISCONTINUED | OUTPATIENT
Start: 2023-06-24 | End: 2023-06-24

## 2023-06-24 RX ADMIN — SODIUM CHLORIDE 100 MILLILITER(S): 9 INJECTION, SOLUTION INTRAVENOUS at 10:56

## 2023-06-24 RX ADMIN — Medication 400 MILLIGRAM(S): at 05:25

## 2023-06-24 RX ADMIN — Medication 1000 MILLIGRAM(S): at 05:55

## 2023-06-24 RX ADMIN — GABAPENTIN 200 MILLIGRAM(S): 400 CAPSULE ORAL at 10:55

## 2023-06-24 NOTE — DISCHARGE NOTE PROVIDER - NSDCFUSCHEDAPPT_GEN_ALL_CORE_FT
[facility]  [address]  Scheduled Appointment: 07/14/2023

## 2023-06-24 NOTE — DISCHARGE NOTE PROVIDER - NSDCCPCAREPLAN_GEN_ALL_CORE_FT
PRINCIPAL DISCHARGE DIAGNOSIS  Diagnosis: Acute cholecystitis  Assessment and Plan of Treatment:   DIET: Low fat  NOTIFY YOUR SURGEON IF: You have any bleeding that does not stop, any pus draining from your wound, any fever (over 100.4 F) or chills, persistent nausea/vomiting with inability to tolerate food or liquids, persistent diarrhea, or if your pain is not controlled on your discharge pain medications.  FOLLOW-UP:  - Please call to make a follow-up appointment within 1-2 weeks of discharge  with Dr Short  - Please follow up with your primary care physician in 1-2 weeks regarding your hospitalization

## 2023-06-24 NOTE — DISCHARGE NOTE PROVIDER - HOSPITAL COURSE
47 year old female with hx of , chiari malformation, presents with abdominal pain x 1 day. Reports epigastric pain started this morning, woke her up from sleep. Never had similar pain before. Denies fever, chills, nausea, vomiting, CP or SOB.     ROS: 10-system review is otherwise negative except HPI above.      Patient declined operative services, tolerated diet. No acute surgical intervention.      Patient will be discharged with 7 days Augmentin and followup in 1 week.        Patient continued to recover appropriately and was deemed stable for dc. At the time of discharge, the patient was hemodynamically stable, tolerating PO diet, voiding urine, passing stool, ambulating with assistance and was comfortable with adequate pain control. The patient was instructed to follow up with Dr. Short within 1 week after discharge.

## 2023-06-24 NOTE — PROGRESS NOTE ADULT - SUBJECTIVE AND OBJECTIVE BOX
SURGERY DAILY PROGRESS NOTE    --------------- OVERNIGHT/INTERVAL EVENTS ---------------  - No acute events overnight     --------------- SUBJECTIVE ---------------  - Patient describes no acute issues or concerns at this time  - -/- BM +void. Denies CP, SOB, n/v, abdominal pain.   - Patient seen and examined at bedside with surgical team    --------------- OBJECTIVE ---------------    -----VITALS-----  T(C): 36.9, Max: 37.2 (06-23)  HR: 63 (60 - 82)  BP: 120/66 (114/70 - 143/89)  RR: 18 (18 - 20)  SpO2: 99% (96% - 99%) room air          06-23 - 06-24  --------------------------------------------------------  IN:    Lactated Ringers: 500 mL  Total IN: 500 mL    OUT:  Total OUT: 0 mL      06-24 - 06-24  --------------------------------------------------------  IN:  Total IN: 0 mL    OUT:    Oral Fluid: 0 mL  Total OUT: 0 mL          -----PHYSICAL EXAM-----  GENERAL: NAD, lying in bed   NEURO: AOx3, awake alert appropriate  HEENT: NCAT, trachea midline  PULM: Respirations non-labored  ABD: Soft, non-tender, non-distended, no peritonitis/rebound tenderness  EXT: Warm, well perfused     -----INs & OUTs-----    06-23 - 06-24  --------------------------------------------------------  IN:    Lactated Ringers: 500 mL  Total IN: 500 mL    OUT:  Total OUT: 0 mL      06-24  -  06-24  --------------------------------------------------------  IN:  Total IN: 0 mL    OUT:    Oral Fluid: 0 mL  Total OUT: 0 mL          -----MEDICATIONS-----  STANDING  enoxaparin Injectable 40 milliGRAM(s) SubCutaneous every 24 hours  ertapenem  IVPB 1000 milliGRAM(s) IV Intermittent every 24 hours  ertapenem  IVPB      gabapentin 200 milliGRAM(s) Oral <User Schedule>  gabapentin 200 milliGRAM(s) Oral <User Schedule>  gabapentin 300 milliGRAM(s) Oral <User Schedule>  lactated ringers. 1000 milliLiter(s) (100 mL/Hr) IV Continuous <Continuous>  potassium phosphate / sodium phosphate Powder (PHOS-NaK) 2 Packet(s) Oral once    PRN      -----LABS-----  137  |  106  |  6<L>  ----------------------------<  84    (06-24)  4.5   |  23  |  0.78            Ca    8.7      06-24  Mg    2.1  Phos  2.3<L>          PT: 11.1 sec     06-23  aPTT: 31.0 sec   INR: 0.96 ratio         Urinalysis Basic - ( 24 Jun 2023 07:19 )    Color: x / Appearance: x / SG: x / pH: x  Gluc: 84 mg/dL / Ketone: x  / Bili: x / Urobili: x   Blood: x / Protein: x / Nitrite: x   Leuk Esterase: x / RBC: x / WBC x   Sq Epi: x / Non Sq Epi: x / Bacteria: x

## 2023-06-24 NOTE — DISCHARGE NOTE PROVIDER - CARE PROVIDER_API CALL
Lona Short  Surgical Critical Care  [address] [place] [address], [address]  [place] [address]  Phone: [phone]  Fax: [phone]  Follow Up Time: 2 weeks

## 2023-06-24 NOTE — DISCHARGE NOTE NURSING/CASE MANAGEMENT/SOCIAL WORK - NSDCPEFALRISK_GEN_ALL_CORE
For information on Fall & Injury Prevention, visit: [email or web address] OR  [email or web address] OR  https://www.cdc.gov/steadi/patient.html

## 2023-06-24 NOTE — PROGRESS NOTE ADULT - ASSESSMENT
47F with acute cholecystitis    Plan:  - Patient declines surgical intervention  - Diet: Regular  - Home if tolerating diet  - D/C with 7 days Augmentin  - f/u in 1 week after discharge with Dr Short.     [id number]

## 2023-06-24 NOTE — DISCHARGE NOTE PROVIDER - NSDCMRMEDTOKEN_GEN_ALL_CORE_FT
amoxicillin-clavulanate 875 mg-125 mg oral tablet: 1 tab(s) orally every 12 hours MDD: 2  azithromycin 500 mg oral tablet: 1 tab(s) orally once a day   cefpodoxime 200 mg oral tablet: 1 tab(s) orally 2 times a day   diclofenac sodium 75 mg oral delayed release tablet: 1 orally  gabapentin 100 mg oral tablet: 2 tab(s) orally once a day (in the afternoon)  gabapentin 100 mg oral tablet: 3 tab(s) orally once a day (at bedtime)  gabapentin 100 mg oral tablet: 3 tab(s) orally once a day (in the morning)  predniSONE 50 mg oral tablet: 1 tab(s) orally once a day x 7 days  valACYclovir 1 g oral tablet: 1 tab(s) orally every 8 hours x 7 days

## 2023-06-24 NOTE — DISCHARGE NOTE NURSING/CASE MANAGEMENT/SOCIAL WORK - PATIENT PORTAL LINK FT
You can access the FollowMyHealth Patient Portal offered by [facility] by registering at the following website: [email or web address]. By joining [facility]’s FollowMyHealth portal, you will also be able to view your health information using other applications (apps) compatible with our system.

## 2023-06-25 LAB
CULTURE RESULTS: SIGNIFICANT CHANGE UP
SPECIMEN SOURCE: SIGNIFICANT CHANGE UP

## 2023-06-27 ENCOUNTER — INPATIENT (INPATIENT)
Facility: HOSPITAL | Age: 48
LOS: 1 days | Discharge: HOME | DRG: 419 | End: 2023-06-29
Attending: SURGERY | Admitting: SURGERY
Payer: MEDICAID

## 2023-06-27 ENCOUNTER — TRANSCRIPTION ENCOUNTER (OUTPATIENT)
Age: 48
End: 2023-06-27

## 2023-06-27 VITALS
OXYGEN SATURATION: 98 % | HEART RATE: 72 BPM | DIASTOLIC BLOOD PRESSURE: 80 MMHG | RESPIRATION RATE: 18 BRPM | HEIGHT: 59 IN | SYSTOLIC BLOOD PRESSURE: 143 MMHG | TEMPERATURE: 98 F | WEIGHT: 139.99 LBS

## 2023-06-27 PROCEDURE — 99285 EMERGENCY DEPT VISIT HI MDM: CPT

## 2023-06-28 ENCOUNTER — RESULT REVIEW (OUTPATIENT)
Age: 48
End: 2023-06-28

## 2023-06-28 DIAGNOSIS — R10.9 UNSPECIFIED ABDOMINAL PAIN: ICD-10-CM

## 2023-06-28 LAB
ALBUMIN SERPL ELPH-MCNC: 4.1 G/DL — SIGNIFICANT CHANGE UP (ref 3.3–5)
ALP SERPL-CCNC: 70 U/L — SIGNIFICANT CHANGE UP (ref 40–120)
ALT FLD-CCNC: 32 U/L — SIGNIFICANT CHANGE UP (ref 10–45)
ANION GAP SERPL CALC-SCNC: 12 MMOL/L — SIGNIFICANT CHANGE UP (ref 5–17)
APPEARANCE UR: CLEAR — SIGNIFICANT CHANGE UP
AST SERPL-CCNC: 30 U/L — SIGNIFICANT CHANGE UP (ref 10–40)
BASE EXCESS BLDV CALC-SCNC: 1.5 MMOL/L — SIGNIFICANT CHANGE UP (ref -2–3)
BASOPHILS # BLD AUTO: 0.05 K/UL — SIGNIFICANT CHANGE UP (ref 0–0.2)
BASOPHILS NFR BLD AUTO: 0.6 % — SIGNIFICANT CHANGE UP (ref 0–2)
BILIRUB SERPL-MCNC: 0.2 MG/DL — SIGNIFICANT CHANGE UP (ref 0.2–1.2)
BILIRUB UR-MCNC: NEGATIVE — SIGNIFICANT CHANGE UP
BUN SERPL-MCNC: 10 MG/DL — SIGNIFICANT CHANGE UP (ref 7–23)
CA-I SERPL-SCNC: 1.22 MMOL/L — SIGNIFICANT CHANGE UP (ref 1.15–1.33)
CALCIUM SERPL-MCNC: 9.2 MG/DL — SIGNIFICANT CHANGE UP (ref 8.4–10.5)
CHLORIDE BLDV-SCNC: 104 MMOL/L — SIGNIFICANT CHANGE UP (ref 96–108)
CHLORIDE SERPL-SCNC: 102 MMOL/L — SIGNIFICANT CHANGE UP (ref 96–108)
CO2 BLDV-SCNC: 29 MMOL/L — HIGH (ref 22–26)
CO2 SERPL-SCNC: 22 MMOL/L — SIGNIFICANT CHANGE UP (ref 22–31)
COLOR SPEC: COLORLESS — SIGNIFICANT CHANGE UP
CREAT SERPL-MCNC: 0.91 MG/DL — SIGNIFICANT CHANGE UP (ref 0.5–1.3)
DIFF PNL FLD: NEGATIVE — SIGNIFICANT CHANGE UP
EGFR: 78 ML/MIN/1.73M2 — SIGNIFICANT CHANGE UP
EOSINOPHIL # BLD AUTO: 0.89 K/UL — HIGH (ref 0–0.5)
EOSINOPHIL NFR BLD AUTO: 10.6 % — HIGH (ref 0–6)
GAS PNL BLDV: 134 MMOL/L — LOW (ref 136–145)
GAS PNL BLDV: SIGNIFICANT CHANGE UP
GLUCOSE BLDV-MCNC: 102 MG/DL — HIGH (ref 70–99)
GLUCOSE SERPL-MCNC: 100 MG/DL — HIGH (ref 70–99)
GLUCOSE UR QL: NEGATIVE — SIGNIFICANT CHANGE UP
HCG SERPL-ACNC: <2 MIU/ML — SIGNIFICANT CHANGE UP
HCO3 BLDV-SCNC: 28 MMOL/L — SIGNIFICANT CHANGE UP (ref 22–29)
HCT VFR BLD CALC: 40.3 % — SIGNIFICANT CHANGE UP (ref 34.5–45)
HCT VFR BLDA CALC: 40 % — SIGNIFICANT CHANGE UP (ref 34.5–46.5)
HGB BLD CALC-MCNC: 13.3 G/DL — SIGNIFICANT CHANGE UP (ref 11.7–16.1)
HGB BLD-MCNC: 12.8 G/DL — SIGNIFICANT CHANGE UP (ref 11.5–15.5)
IMM GRANULOCYTES NFR BLD AUTO: 0.2 % — SIGNIFICANT CHANGE UP (ref 0–0.9)
KETONES UR-MCNC: NEGATIVE — SIGNIFICANT CHANGE UP
LACTATE BLDV-MCNC: 1 MMOL/L — SIGNIFICANT CHANGE UP (ref 0.5–2)
LEUKOCYTE ESTERASE UR-ACNC: NEGATIVE — SIGNIFICANT CHANGE UP
LIDOCAIN IGE QN: 43 U/L — SIGNIFICANT CHANGE UP (ref 7–60)
LYMPHOCYTES # BLD AUTO: 2.37 K/UL — SIGNIFICANT CHANGE UP (ref 1–3.3)
LYMPHOCYTES # BLD AUTO: 28.3 % — SIGNIFICANT CHANGE UP (ref 13–44)
MCHC RBC-ENTMCNC: 27.2 PG — SIGNIFICANT CHANGE UP (ref 27–34)
MCHC RBC-ENTMCNC: 31.8 GM/DL — LOW (ref 32–36)
MCV RBC AUTO: 85.7 FL — SIGNIFICANT CHANGE UP (ref 80–100)
MONOCYTES # BLD AUTO: 0.6 K/UL — SIGNIFICANT CHANGE UP (ref 0–0.9)
MONOCYTES NFR BLD AUTO: 7.2 % — SIGNIFICANT CHANGE UP (ref 2–14)
NEUTROPHILS # BLD AUTO: 4.45 K/UL — SIGNIFICANT CHANGE UP (ref 1.8–7.4)
NEUTROPHILS NFR BLD AUTO: 53.1 % — SIGNIFICANT CHANGE UP (ref 43–77)
NITRITE UR-MCNC: NEGATIVE — SIGNIFICANT CHANGE UP
NRBC # BLD: 0 /100 WBCS — SIGNIFICANT CHANGE UP (ref 0–0)
PCO2 BLDV: 49 MMHG — HIGH (ref 39–42)
PH BLDV: 7.36 — SIGNIFICANT CHANGE UP (ref 7.32–7.43)
PH UR: 6.5 — SIGNIFICANT CHANGE UP (ref 5–8)
PLATELET # BLD AUTO: 218 K/UL — SIGNIFICANT CHANGE UP (ref 150–400)
PO2 BLDV: 28 MMHG — SIGNIFICANT CHANGE UP (ref 25–45)
POTASSIUM BLDV-SCNC: 4.1 MMOL/L — SIGNIFICANT CHANGE UP (ref 3.5–5.1)
POTASSIUM SERPL-MCNC: 4.4 MMOL/L — SIGNIFICANT CHANGE UP (ref 3.5–5.3)
POTASSIUM SERPL-SCNC: 4.4 MMOL/L — SIGNIFICANT CHANGE UP (ref 3.5–5.3)
PROT SERPL-MCNC: 7.7 G/DL — SIGNIFICANT CHANGE UP (ref 6–8.3)
PROT UR-MCNC: NEGATIVE — SIGNIFICANT CHANGE UP
RBC # BLD: 4.7 M/UL — SIGNIFICANT CHANGE UP (ref 3.8–5.2)
RBC # FLD: 13.3 % — SIGNIFICANT CHANGE UP (ref 10.3–14.5)
SAO2 % BLDV: 39 % — LOW (ref 67–88)
SODIUM SERPL-SCNC: 136 MMOL/L — SIGNIFICANT CHANGE UP (ref 135–145)
SP GR SPEC: 1 — LOW (ref 1.01–1.02)
UROBILINOGEN FLD QL: NEGATIVE — SIGNIFICANT CHANGE UP
WBC # BLD: 8.38 K/UL — SIGNIFICANT CHANGE UP (ref 3.8–10.5)
WBC # FLD AUTO: 8.38 K/UL — SIGNIFICANT CHANGE UP (ref 3.8–10.5)

## 2023-06-28 PROCEDURE — 88304 TISSUE EXAM BY PATHOLOGIST: CPT | Mod: 26

## 2023-06-28 PROCEDURE — 47562 LAPAROSCOPIC CHOLECYSTECTOMY: CPT

## 2023-06-28 PROCEDURE — 71045 X-RAY EXAM CHEST 1 VIEW: CPT | Mod: 26

## 2023-06-28 DEVICE — STAPLER COVIDIEN TRI-STAPLE 30MM PURPLE RELOAD: Type: IMPLANTABLE DEVICE | Site: ABDOMINAL | Status: FUNCTIONAL

## 2023-06-28 DEVICE — SURGICEL 4 X 8": Type: IMPLANTABLE DEVICE | Site: ABDOMINAL | Status: FUNCTIONAL

## 2023-06-28 DEVICE — CLIP APPLIER COVIDIEN ENDOCLIP III 5MM: Type: IMPLANTABLE DEVICE | Site: ABDOMINAL | Status: FUNCTIONAL

## 2023-06-28 RX ORDER — ENOXAPARIN SODIUM 100 MG/ML
40 INJECTION SUBCUTANEOUS EVERY 24 HOURS
Refills: 0 | Status: DISCONTINUED | OUTPATIENT
Start: 2023-06-28 | End: 2023-06-28

## 2023-06-28 RX ORDER — GABAPENTIN 400 MG/1
300 CAPSULE ORAL
Refills: 0 | Status: DISCONTINUED | OUTPATIENT
Start: 2023-06-28 | End: 2023-06-29

## 2023-06-28 RX ORDER — GABAPENTIN 400 MG/1
200 CAPSULE ORAL
Refills: 0 | Status: DISCONTINUED | OUTPATIENT
Start: 2023-06-28 | End: 2023-06-28

## 2023-06-28 RX ORDER — SODIUM CHLORIDE 9 MG/ML
1000 INJECTION INTRAMUSCULAR; INTRAVENOUS; SUBCUTANEOUS ONCE
Refills: 0 | Status: COMPLETED | OUTPATIENT
Start: 2023-06-28 | End: 2023-06-28

## 2023-06-28 RX ORDER — SODIUM CHLORIDE 9 MG/ML
1000 INJECTION INTRAMUSCULAR; INTRAVENOUS; SUBCUTANEOUS
Refills: 0 | Status: DISCONTINUED | OUTPATIENT
Start: 2023-06-28 | End: 2023-06-28

## 2023-06-28 RX ORDER — ACETAMINOPHEN 500 MG
1000 TABLET ORAL ONCE
Refills: 0 | Status: COMPLETED | OUTPATIENT
Start: 2023-06-28 | End: 2023-06-28

## 2023-06-28 RX ORDER — SODIUM CHLORIDE 9 MG/ML
1000 INJECTION INTRAMUSCULAR; INTRAVENOUS; SUBCUTANEOUS ONCE
Refills: 0 | Status: DISCONTINUED | OUTPATIENT
Start: 2023-06-28 | End: 2023-06-28

## 2023-06-28 RX ORDER — ERTAPENEM SODIUM 1 G/1
1000 INJECTION, POWDER, LYOPHILIZED, FOR SOLUTION INTRAMUSCULAR; INTRAVENOUS EVERY 24 HOURS
Refills: 0 | Status: DISCONTINUED | OUTPATIENT
Start: 2023-06-28 | End: 2023-06-28

## 2023-06-28 RX ORDER — MORPHINE SULFATE 50 MG/1
2 CAPSULE, EXTENDED RELEASE ORAL ONCE
Refills: 0 | Status: DISCONTINUED | OUTPATIENT
Start: 2023-06-28 | End: 2023-06-28

## 2023-06-28 RX ORDER — ACETAMINOPHEN 500 MG
1000 TABLET ORAL EVERY 6 HOURS
Refills: 0 | Status: DISCONTINUED | OUTPATIENT
Start: 2023-06-28 | End: 2023-06-28

## 2023-06-28 RX ORDER — ENOXAPARIN SODIUM 100 MG/ML
40 INJECTION SUBCUTANEOUS EVERY 24 HOURS
Refills: 0 | Status: DISCONTINUED | OUTPATIENT
Start: 2023-06-29 | End: 2023-06-29

## 2023-06-28 RX ORDER — GABAPENTIN 400 MG/1
300 CAPSULE ORAL
Refills: 0 | Status: DISCONTINUED | OUTPATIENT
Start: 2023-06-28 | End: 2023-06-28

## 2023-06-28 RX ORDER — SODIUM CHLORIDE 9 MG/ML
1000 INJECTION, SOLUTION INTRAVENOUS
Refills: 0 | Status: DISCONTINUED | OUTPATIENT
Start: 2023-06-28 | End: 2023-06-28

## 2023-06-28 RX ORDER — GABAPENTIN 400 MG/1
200 CAPSULE ORAL
Refills: 0 | Status: DISCONTINUED | OUTPATIENT
Start: 2023-06-28 | End: 2023-06-29

## 2023-06-28 RX ORDER — ONDANSETRON 8 MG/1
4 TABLET, FILM COATED ORAL EVERY 6 HOURS
Refills: 0 | Status: DISCONTINUED | OUTPATIENT
Start: 2023-06-28 | End: 2023-06-29

## 2023-06-28 RX ORDER — GABAPENTIN 400 MG/1
3 CAPSULE ORAL
Refills: 0 | DISCHARGE

## 2023-06-28 RX ORDER — GABAPENTIN 400 MG/1
2 CAPSULE ORAL
Refills: 0 | DISCHARGE

## 2023-06-28 RX ORDER — DICLOFENAC SODIUM 75 MG/1
1 TABLET, DELAYED RELEASE ORAL
Refills: 0 | DISCHARGE

## 2023-06-28 RX ORDER — OXYCODONE HYDROCHLORIDE 5 MG/1
5 TABLET ORAL EVERY 6 HOURS
Refills: 0 | Status: DISCONTINUED | OUTPATIENT
Start: 2023-06-28 | End: 2023-06-29

## 2023-06-28 RX ORDER — HYDROMORPHONE HYDROCHLORIDE 2 MG/ML
0.5 INJECTION INTRAMUSCULAR; INTRAVENOUS; SUBCUTANEOUS
Refills: 0 | Status: DISCONTINUED | OUTPATIENT
Start: 2023-06-28 | End: 2023-06-28

## 2023-06-28 RX ORDER — OXYCODONE HYDROCHLORIDE 5 MG/1
2.5 TABLET ORAL EVERY 6 HOURS
Refills: 0 | Status: DISCONTINUED | OUTPATIENT
Start: 2023-06-28 | End: 2023-06-29

## 2023-06-28 RX ORDER — ACETAMINOPHEN 500 MG
1000 TABLET ORAL EVERY 6 HOURS
Refills: 0 | Status: DISCONTINUED | OUTPATIENT
Start: 2023-06-28 | End: 2023-06-29

## 2023-06-28 RX ADMIN — Medication 1000 MILLIGRAM(S): at 05:59

## 2023-06-28 RX ADMIN — OXYCODONE HYDROCHLORIDE 5 MILLIGRAM(S): 5 TABLET ORAL at 18:38

## 2023-06-28 RX ADMIN — Medication 1000 MILLIGRAM(S): at 21:03

## 2023-06-28 RX ADMIN — Medication 400 MILLIGRAM(S): at 05:29

## 2023-06-28 RX ADMIN — ERTAPENEM SODIUM 120 MILLIGRAM(S): 1 INJECTION, POWDER, LYOPHILIZED, FOR SOLUTION INTRAMUSCULAR; INTRAVENOUS at 06:42

## 2023-06-28 RX ADMIN — GABAPENTIN 200 MILLIGRAM(S): 400 CAPSULE ORAL at 05:29

## 2023-06-28 RX ADMIN — OXYCODONE HYDROCHLORIDE 5 MILLIGRAM(S): 5 TABLET ORAL at 19:08

## 2023-06-28 RX ADMIN — ONDANSETRON 4 MILLIGRAM(S): 8 TABLET, FILM COATED ORAL at 20:33

## 2023-06-28 RX ADMIN — Medication 400 MILLIGRAM(S): at 01:16

## 2023-06-28 RX ADMIN — SODIUM CHLORIDE 1000 MILLILITER(S): 9 INJECTION INTRAMUSCULAR; INTRAVENOUS; SUBCUTANEOUS at 01:15

## 2023-06-28 RX ADMIN — HYDROMORPHONE HYDROCHLORIDE 0.5 MILLIGRAM(S): 2 INJECTION INTRAMUSCULAR; INTRAVENOUS; SUBCUTANEOUS at 16:10

## 2023-06-28 RX ADMIN — HYDROMORPHONE HYDROCHLORIDE 0.5 MILLIGRAM(S): 2 INJECTION INTRAMUSCULAR; INTRAVENOUS; SUBCUTANEOUS at 16:40

## 2023-06-28 RX ADMIN — HYDROMORPHONE HYDROCHLORIDE 0.5 MILLIGRAM(S): 2 INJECTION INTRAMUSCULAR; INTRAVENOUS; SUBCUTANEOUS at 16:25

## 2023-06-28 RX ADMIN — Medication 400 MILLIGRAM(S): at 20:33

## 2023-06-28 RX ADMIN — GABAPENTIN 300 MILLIGRAM(S): 400 CAPSULE ORAL at 17:04

## 2023-06-28 RX ADMIN — SODIUM CHLORIDE 75 MILLILITER(S): 9 INJECTION, SOLUTION INTRAVENOUS at 16:26

## 2023-06-28 NOTE — ED ADULT NURSE NOTE - OBJECTIVE STATEMENT
Pt is 47 y.o female presenting c/o abdominal pain since since 6/27/2023. Pt is A&Ox3 and is upright in stretcher. Pt endorses RUQ pain that is 8/10 and radiates to her back. PT was previously seen at [facility] on 06/23 and admitted for acute cholecystitis. At that time she did not want surgery Pt is 47 y.o female presenting c/o abdominal pain since since 6/27/2023. Pt is A&Ox3 and is upright in stretcher. Pt endorses RUQ pain that is 8/10 and radiates to her back. PT was previously seen at [facility] on 06/23 and admitted for acute cholecystitis. At that time she did not want surgery and was taking abx at home. Pt reports the pain had resolved until around 6pm when the RUQ pain worsened than before. Upon assessment spontaneously breathing pulse ox >95% on RA, RUQ tender to palpation, skin is warm, dry and intact, peripheral pulses intact and able to move extremities equally. Pt denies any nausea, vomitting, diarrhea, fever or chills. Safety and comfort measures present bed in lowest position, side rails up, and blanket given.

## 2023-06-28 NOTE — H&P ADULT - HISTORY OF PRESENT ILLNESS
HPI:  47 year old female with hx of , chiari malformation, presents with abdominal pain x1 day. Was recently admitted to surgery service on  due to acute cholecystitis, however due to Islam reasons, deferred surgery and wanted to trial antibiotics. Was sent home with 1 week augmentin, which she has been compliant with. Was without symptoms until today around 5PM when she developed severe RUQ pain, worse than before and radiating to right back. Denies fever, chills, nausea, vomiting, CP or SOB.         PAST MEDICAL & SURGICAL HISTORY:  Nonintractable headache, unspecified chronicity pattern, unspecified headache type  Chiari malformation- unspecified type      C Section  2000          MEDICATIONS  (STANDING):  acetaminophen   IVPB .. 1000 milliGRAM(s) IV Intermittent Once  sodium chloride 0.9% Bolus 1000 milliLiter(s) IV Bolus once    MEDICATIONS  (PRN):      Allergies    Macrobid (Anaphylaxis)    Intolerances        Physical Exam:  General: NAD, sitting on stretcher   HEENT: NC/AT, EOMI, normal hearing  Pulmonary: normal resp effort  Cardiovascular: RRR  Abdominal: soft, TTP in RUQ, nondistended, well healed c/s scar   Extremities: WWP, normal strength  Neuro: A/O x 3, CNs II-XII grossly intact, normal sensation, no focal deficits      Vital Signs Last 24 Hrs  T(C): 36.7 (2023 00:50), Max: 36.7 (2023 22:57)  T(F): 98 (2023 00:50), Max: 98.1 (2023 22:57)  HR: 70 (2023 00:50) (70 - 72)  BP: 138/83 (2023 00:50) (138/83 - 143/80)  BP(mean): 101 (2023 00:50) (101 - 101)  RR: 18 (2023 00:50) (18 - 18)  SpO2: 100% (2023 00:50) (98% - 100%)    Parameters below as of 2023 22:57  Patient On (Oxygen Delivery Method): room air        I&O's Summary          LABS:                        12.8   8.38  )-----------( 218      ( 2023 00:55 )             40.3               CAPILLARY BLOOD GLUCOSE            Cultures:      RADIOLOGY & ADDITIONAL STUDIES:

## 2023-06-28 NOTE — ED ADULT NURSE REASSESSMENT NOTE - NS ED NURSE REASSESS COMMENT FT1
Pt updated on plan of care, admitted to surgery, awaiting bed RTM. Pt understood. Safety and comfort measures in place bed in lowest position, side rails up, and blanket given.

## 2023-06-28 NOTE — BRIEF OPERATIVE NOTE - OPERATION/FINDINGS
laparoscopic cholecystectomy, critical view of safety achieved. Cystic artery clipped with 5mm clips. The cystic duct was dilated, and a gallstone lodge in the infundibulum but it was milked into the gallbladder. Due to enlarged cystic duct, a purple load EndoGIA stapler was used to divide it. Also it was noted to have a liver tear due to dense adhesions from the edge of the liver when retracting the fundus of the gallbladder, but hemostasis was achieved with a surgicel.

## 2023-06-28 NOTE — ED ADULT NURSE REASSESSMENT NOTE - NSFALLRISKASMTTYPE_ED_ALL_ED
normal appearance , without tenderness upon palpation , no deformities , trachea midline , Thyroid normal size , no thyroid nodules , no masses , no JVD , thyroid nontender
Routine Reassessment

## 2023-06-28 NOTE — PATIENT PROFILE ADULT - FLU SEASON?
Annual Well Woman Exam  4/12/2021    Reason for visit: Annual well woman exam    HPI: Patient is a 40 yo nulligravid female who presents today for annual well woman exam.  Last year patient had discussion of going off OCP due to no longer using Accutane due to liver concerns as well as her partner, Tim, having had a vasectomy and not needing OCP for contraception.  Today, patient states overall doing well.  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  Hoping her insurance will still cover us here moving forward.    Has not had menses since January.  Unsure if related to job loss and stress of this, but not back yet.  Has not taken UPT as wasn't worried about it due to vasectomy of partner, but guess may as well check.  Not interested in long term hormonal options as has not done well on this before.  Has had recent preventive exam with PCP and had fasting glucose in diabetes range, lipids slightly elevation.      Patient does note mood changes around everything in the last year.  Does have counselor she has good relationship with and feels supported in this way.  Declines any further intervention at this time from that perspective.  Has been on medications in the past and did not do well with them.  Has had some associated sleep disturbances as well, unsure what exactly could be attributed to, possible with all of this.    Past OB/GYN History:  Nulligravid, no desires for future childbearing  Menses: Per HPI  No STI history  History of female and male partners, currently with male partner Tim, live in [place] together in their house  Contraception: Vasectomy  Pap smear history:   10/2018 had NILM, other HR HPV positive pap  10/2019 had NILM, other HR HPV positive pap  2/2020: Colposcopy negative for dysplasia on cervical biopsy and ECC  Due for cotesting today  No  concerning discharge, No dyspareunia  Declines STI Screening today    Past Medical History:   Diagnosis Date     Abnormal Pap smear     Don't remember when it was and follow up clear     Abnormal Pap smear of cervix      Allergic rhinitis, cause unspecified      Depressive disorder      Migraines      Obesity      Obstructive sleep apnea      Other acne      Polycystic ovary syndrome      Sleep apnea     No treatment at this time.     Uncomplicated asthma     Borderline - exercise induced     Unspecified urinary incontinence      Varicella      Past Surgical History:   Procedure Laterality Date     BIOPSY      Lump in breast - diagnosis was fatty tumor     GENITOURINARY SURGERY      Urethrotomy     SEPTOPLASTY, TURBINOPLASTY, COMBINED N/A 2016    Procedure: COMBINED SEPTOPLASTY, TURBINOPLASTY;  Surgeon: Baldev Perez MD;  Location:  OR     [facility] NONSPECIFIC PROCEDURE      Urology surgery for night time bed wetting at age 5.     Current Outpatient Medications   Medication     medroxyPROGESTERone (PROVERA) 10 MG tablet     albuterol (PROAIR HFA/PROVENTIL HFA/VENTOLIN HFA) 108 (90 Base) MCG/ACT inhaler     montelukast (SINGULAIR) 10 MG tablet     No current facility-administered medications for this visit.      Allergies   Allergen Reactions     Animal Dander      Cats      Dogs      Rabbit Protein      Seasonal Allergies      Social History     Tobacco Use     Smoking status: Never Smoker     Smokeless tobacco: Never Used   Substance Use Topics     Alcohol use: Yes     Comment: 6 drinks weekly     Drug use: No     Family History   Problem Relation Age of Onset     Cerebrovascular Disease Father         x2     Hypertension Father      Seizure Disorder Father      Substance Abuse Father      Diabetes Paternal Grandfather      Cancer Paternal Grandfather           from throat cancer. Also had melanoma.     Blood Disease Paternal Grandfather         B12 DEFICIENCY - WAS ON  B12 SHOTS     Substance Abuse Paternal Grandfather      Obesity Paternal Grandfather      Cancer Maternal Grandmother          of colon cancer     Colon Cancer Maternal Grandmother      Substance Abuse Maternal Grandmother      Cerebrovascular Disease Maternal Grandfather      Heart Disease Maternal Grandfather               Substance Abuse Maternal Grandfather      Neurologic Disorder Sister         Epilepsy diagnosed      Neurologic Disorder Sister         Epilepsy diagnosed      Mental Illness Sister      Substance Abuse Sister      Substance Abuse Paternal Grandmother      Melanoma No family hx of      Skin Cancer No family hx of      Liver Disease No family hx of      ROS:  Answers for HPI/ROS submitted by the patient on 2021, reviewed and unchanged today   TRISHA 7 TOTAL SCORE: 2  General Symptoms: No  Skin Symptoms: No  HENT Symptoms: No  EYE SYMPTOMS: No  HEART SYMPTOMS: No  LUNG SYMPTOMS: Yes  INTESTINAL SYMPTOMS: No  URINARY SYMPTOMS: Yes  GYNECOLOGIC SYMPTOMS: Yes  BREAST SYMPTOMS: No  SKELETAL SYMPTOMS: Yes  BLOOD SYMPTOMS: Yes  NERVOUS SYSTEM SYMPTOMS: Yes  MENTAL HEALTH SYMPTOMS: Yes  Cough: No  Sputum or phlegm: No  Coughing up blood: No  Difficulty breating or shortness of breath: No  Snoring: Yes  Wheezing: No  Difficulty breathing on exertion: No  Nighttime Cough: No  Difficulty breathing when lying flat: No  Trouble holding urine or incontinence: Yes  Pain or burning: No  Trouble starting or stopping: No  Increased frequency of urination: Yes  Blood in urine: No  Decreased frequency of urination: No  Frequent nighttime urination: No  Flank pain: No  Difficulty emptying bladder: No  Back pain: Yes  Muscle aches: Yes  Neck pain: Yes  Swollen joints: No  Joint pain: No  Bone pain: No  Muscle cramps: Yes  Muscle weakness: No  Joint stiffness: No  Bone fracture: No  Anemia: No  Swollen glands: No  Easy bleeding or bruising: Yes  Edema or swelling: No  Trouble with  "coordination: No  Dizziness or trouble with balance: No  Fainting or black-out spells: No  Memory loss: No  Headache: No  Seizures: No  Speech problems: No  Tingling: Yes  Tremor: No  Weakness: No  Difficulty walking: No  Paralysis: No  Numbness: Yes  Bleeding or spotting between periods: No  Heavy or painful periods: No  Irregular periods: Yes  Vaginal discharge: No  Hot flashes: No  Vaginal dryness: No  Genital ulcers: No  Reduced libido: Yes  Painful intercourse: No  Difficulty with sexual arousal: No  Post-menopausal bleeding: No  Nervous or Anxious: Yes  Depression: Yes  Trouble sleeping: Yes  Trouble thinking or concentrating: No  Mood changes: Yes  Panic attacks: No    O:  BP (!) 149/102   Pulse 78   Ht 1.651 m (5' 5\")   Wt 123.1 kg (271 lb 6.4 oz)   BMI 45.16 kg/m     General: NAD, A&Ox3  Neck: No thyromegaly, No thyroid nodules appreciated  Lungs: CTA B/L  CV: RRR  Breasts: Symmetrical, No lymphadenopathy, skin changes, nipple discharge or nodules appreciated bilaterally  Abdomen: Soft, NT, ND  Genitourinary:   External Genitalia:  General appearance; normal, Hair distribution; normal, Lesions absent  Urethral Meatus:  Size normal, Location normal, Lesions absent  Urethra:  Fullness absent, Masses absent  Bladder:  Fullness absent, Masses absent, Tenderness absent  Vagina:  General appearance normal, Estrogen effect normal, Discharge absent, Lesions absent  Cervix:  General appearance normal, Lesions absent, Discharge absent, Tenderness absent, Enlargement absent  Uterus:  Size normal, Masses absent, Tenderness absent  Adenexa:  No masses appreciated  Anus/Perineum:  Lesions absent     A/P: 38 yo nulligravid female presents for annual well woman exam  1) Normal breast and pelvic exam  2) Screening for malignant neoplasm of cervix: Due for cotesting today, collected  3) Screening for breast cancer: Turns 40 in July, discussed pros and cons of early mammogram screening, desires to proceed however given " insurance change will plan for awaiting to see if we are covered and will ask for order if can still get here moving forward.  4) Irregular menses: UPT today to ensure negative.  Did discuss importance of withdrawal bleeds every 3-4 months if not happening spontaneously from endometrial protections standpoint.  Discussed provera versus Mirena, desires provera.  Given Rx today.  5) Mood changes: Has counselor she works with and it beneficial, plans to continue with this intervention, declines any further intervention at this time, aware of options and to reach out if desire further intervention.  6) UTD on diabetes and lipid screening with PCP, fasting glucose pre-diabetes level and patient aware  7) Sleep disturbance: Did discuss consideration of sleep study to examine for LANDRY and see if related, will consider  8) RTC in 1 year for annual, earlier if abnormal pap or with any other concerns    Anisha Diana MD   No

## 2023-06-28 NOTE — ED PROVIDER NOTE - PROGRESS NOTE DETAILS
Willem Giordano PA-C: spoke with surgery. pending consult at this time. Willem briones PA-C: spoke with surgery. request admission to their service. discussed with Dr. Llanes.

## 2023-06-28 NOTE — ED PROVIDER NOTE - CLINICAL SUMMARY MEDICAL DECISION MAKING FREE TEXT BOX
Mario Alberto; 47 year old female with abdominal pain, ruq radiating to back.  recently admitted here for acute rohini. did not want surgeyr at the time, currently returning for increase pain and requesting to have gb removed. not been able to tolerate PO. PE: att exam: patient awake alert NAD . LUNGS CTAB no wheeze no crackle. CARD RRR no m/r/g.  Abdomensoft, ruq ttp. EXT WWP no edema no calf tenderness CV 2+DP/PT bilaterally. neuro A&Ox3 gait normal.  skin warm and dry no rash  plan: labs, ivf, pain control, surgery consult, admit for biliary colic.

## 2023-06-28 NOTE — CHART NOTE - NSCHARTNOTEFT_GEN_A_CORE
Post Operative Note  Patient: MAGALIE SCHAFFER 47y (1975) Female   MRN: [id number]  Location: [facility] 2MON 219 D1  Visit: 06-28-23 Inpatient  Date: 06-28-23 @ 19:05    Procedure: S/P kareem nova    Subjective: Patient seen and examined post operatively. Reports pain as controlled, endorsing mild nausea. Denies vomiting, fever, chills, chest pain, SOB, cough.       Objective:  Vitals: T(F): 97.7 (06-28-23 @ 18:02), Max: 99.5 (06-28-23 @ 03:01)  HR: 80 (06-28-23 @ 18:02)  BP: 109/70 (06-28-23 @ 18:02) (109/70 - 147/79)  RR: 18 (06-28-23 @ 18:02)  SpO2: 95% (06-28-23 @ 18:02)  Vent Settings:     In:   06-27-23 @ 07:01  -  06-28-23 @ 07:00  --------------------------------------------------------  IN: 300 mL    06-28-23 @ 07:01  -  06-28-23 @ 19:05  --------------------------------------------------------  IN: 519 mL      IV Fluids:     Out:   06-27-23 @ 07:01  -  06-28-23 @ 07:00  --------------------------------------------------------  OUT: 350 mL    06-28-23 @ 07:01  -  06-28-23 @ 19:05  --------------------------------------------------------  OUT: 450 mL      EBL:     Voided Urine:   06-27-23 @ 07:01  -  06-28-23 @ 07:00  --------------------------------------------------------  OUT: 350 mL    06-28-23 @ 07:01  -  06-28-23 @ 19:05  --------------------------------------------------------  OUT: 450 mL        Physical Examination:  General: NAD, resting comfortably in bed  HEENT: Normocephalic atraumatic  Respiratory: Nonlabored respirations, normal CW expansion.  Cardio: S1S2, regular rate and rhythm.  Abdomen: softly distended, appropriately tender, surgical incisions are c/d/i.  Vascular: extremities are warm and well perfused.     Imaging:  No post-op imaging studies    Assessment:  47yFemale patient S/P kareem rohini     Plan:  - Pain control PRN  - Diet: regular  - Activity: OOB  - DVT ppx: SCD's & lovenox    Date/Time: 06-28-23 @ 19:05

## 2023-06-28 NOTE — ED PROVIDER NOTE - OBJECTIVE STATEMENT
46 y/o female, hx of , Chiari malformation, presents to the ER for abdominal pain. patient states pain started today. states located to RUQ and radiates to her back. patient was here  and admitted for acute cholecystitis. states did not want the surgery at that time but presents today requesting to have her gallbladder removed. denies f/n/v/d, CP, SOB, HA, dizziness, urinary symptoms, fall, trauma.

## 2023-06-28 NOTE — PRE-ANESTHESIA EVALUATION ADULT - NSANTHAIRWAYFT_ENT_ALL_CORE
mp3, poor mouth opening effort, partial dentures remain intact during exam but will be removed prior to OR, full neck ROM

## 2023-06-28 NOTE — ED ADULT NURSE NOTE - NSFALLUNIVINTERV_ED_ALL_ED
Bed/Stretcher in lowest position, wheels locked, appropriate side rails in place/Call bell, personal items and telephone in reach/Instruct patient to call for assistance before getting out of bed/chair/stretcher/Non-slip footwear applied when patient is off stretcher/[place] to call system/Physically safe environment - no spills, clutter or unnecessary equipment/Purposeful proactive rounding/Room/bathroom lighting operational, light cord in reach

## 2023-06-28 NOTE — ED ADULT NURSE REASSESSMENT NOTE - NSFALLUNIVINTERV_ED_ALL_ED
Bed/Stretcher in lowest position, wheels locked, appropriate side rails in place/Call bell, personal items and telephone in reach/Instruct patient to call for assistance before getting out of bed/chair/stretcher/Non-slip footwear applied when patient is off stretcher/[place] to call system/Physically safe environment - no spills, clutter or unnecessary equipment/Purposeful proactive rounding/Room/bathroom lighting operational, light cord in reach

## 2023-06-28 NOTE — H&P ADULT - ASSESSMENT
47F PMH , chiari malformation, recently admitted for cholecystitis  but deferred surgery, presents with recurrent pain, now amenable to surgery.     Plan:  - Admit to [facility], under Dr. Keith Long, floor bed   - Added on, consented for laparoscopic cholecystectomy, possible open   - NPO past midnight, IVF  - IV abx  - Pain control PRN    To be d/w Dr. Ethan Nunez, PGY-2  ACS  [id number]

## 2023-06-29 ENCOUNTER — TRANSCRIPTION ENCOUNTER (OUTPATIENT)
Age: 48
End: 2023-06-29

## 2023-06-29 VITALS
OXYGEN SATURATION: 96 % | SYSTOLIC BLOOD PRESSURE: 128 MMHG | HEART RATE: 90 BPM | TEMPERATURE: 100 F | RESPIRATION RATE: 18 BRPM | DIASTOLIC BLOOD PRESSURE: 71 MMHG

## 2023-06-29 LAB
CULTURE RESULTS: NO GROWTH — SIGNIFICANT CHANGE UP
HCT VFR BLD CALC: 39 % — SIGNIFICANT CHANGE UP (ref 34.5–45)
HGB BLD-MCNC: 12.6 G/DL — SIGNIFICANT CHANGE UP (ref 11.5–15.5)
MCHC RBC-ENTMCNC: 27.4 PG — SIGNIFICANT CHANGE UP (ref 27–34)
MCHC RBC-ENTMCNC: 32.3 GM/DL — SIGNIFICANT CHANGE UP (ref 32–36)
MCV RBC AUTO: 84.8 FL — SIGNIFICANT CHANGE UP (ref 80–100)
NRBC # BLD: 0 /100 WBCS — SIGNIFICANT CHANGE UP (ref 0–0)
PLATELET # BLD AUTO: 217 K/UL — SIGNIFICANT CHANGE UP (ref 150–400)
RBC # BLD: 4.6 M/UL — SIGNIFICANT CHANGE UP (ref 3.8–5.2)
RBC # FLD: 13.3 % — SIGNIFICANT CHANGE UP (ref 10.3–14.5)
SPECIMEN SOURCE: SIGNIFICANT CHANGE UP
WBC # BLD: 10.22 K/UL — SIGNIFICANT CHANGE UP (ref 3.8–10.5)
WBC # FLD AUTO: 10.22 K/UL — SIGNIFICANT CHANGE UP (ref 3.8–10.5)

## 2023-06-29 PROCEDURE — 85014 HEMATOCRIT: CPT

## 2023-06-29 PROCEDURE — 85025 COMPLETE CBC W/AUTO DIFF WBC: CPT

## 2023-06-29 PROCEDURE — 96374 THER/PROPH/DIAG INJ IV PUSH: CPT

## 2023-06-29 PROCEDURE — 86900 BLOOD TYPING SEROLOGIC ABO: CPT

## 2023-06-29 PROCEDURE — C1889: CPT

## 2023-06-29 PROCEDURE — 82435 ASSAY OF BLOOD CHLORIDE: CPT

## 2023-06-29 PROCEDURE — 71045 X-RAY EXAM CHEST 1 VIEW: CPT

## 2023-06-29 PROCEDURE — 84295 ASSAY OF SERUM SODIUM: CPT

## 2023-06-29 PROCEDURE — 82330 ASSAY OF CALCIUM: CPT

## 2023-06-29 PROCEDURE — 85027 COMPLETE CBC AUTOMATED: CPT

## 2023-06-29 PROCEDURE — 99285 EMERGENCY DEPT VISIT HI MDM: CPT

## 2023-06-29 PROCEDURE — 84702 CHORIONIC GONADOTROPIN TEST: CPT

## 2023-06-29 PROCEDURE — 88304 TISSUE EXAM BY PATHOLOGIST: CPT

## 2023-06-29 PROCEDURE — 82947 ASSAY GLUCOSE BLOOD QUANT: CPT

## 2023-06-29 PROCEDURE — C9399: CPT

## 2023-06-29 PROCEDURE — 84132 ASSAY OF SERUM POTASSIUM: CPT

## 2023-06-29 PROCEDURE — 85018 HEMOGLOBIN: CPT

## 2023-06-29 PROCEDURE — 86850 RBC ANTIBODY SCREEN: CPT

## 2023-06-29 PROCEDURE — 83690 ASSAY OF LIPASE: CPT

## 2023-06-29 PROCEDURE — 82803 BLOOD GASES ANY COMBINATION: CPT

## 2023-06-29 PROCEDURE — 83605 ASSAY OF LACTIC ACID: CPT

## 2023-06-29 PROCEDURE — 86901 BLOOD TYPING SEROLOGIC RH(D): CPT

## 2023-06-29 PROCEDURE — 87086 URINE CULTURE/COLONY COUNT: CPT

## 2023-06-29 PROCEDURE — 80053 COMPREHEN METABOLIC PANEL: CPT

## 2023-06-29 PROCEDURE — 81003 URINALYSIS AUTO W/O SCOPE: CPT

## 2023-06-29 RX ORDER — OXYCODONE HYDROCHLORIDE 5 MG/1
1 TABLET ORAL
Qty: 6 | Refills: 0
Start: 2023-06-29

## 2023-06-29 RX ADMIN — ONDANSETRON 4 MILLIGRAM(S): 8 TABLET, FILM COATED ORAL at 02:42

## 2023-06-29 RX ADMIN — ENOXAPARIN SODIUM 40 MILLIGRAM(S): 100 INJECTION SUBCUTANEOUS at 09:00

## 2023-06-29 RX ADMIN — Medication 1000 MILLIGRAM(S): at 09:30

## 2023-06-29 RX ADMIN — Medication 400 MILLIGRAM(S): at 02:43

## 2023-06-29 RX ADMIN — Medication 1000 MILLIGRAM(S): at 03:13

## 2023-06-29 RX ADMIN — Medication 400 MILLIGRAM(S): at 09:00

## 2023-06-29 RX ADMIN — GABAPENTIN 200 MILLIGRAM(S): 400 CAPSULE ORAL at 05:28

## 2023-06-29 NOTE — DISCHARGE NOTE NURSING/CASE MANAGEMENT/SOCIAL WORK - NSDCPEFALRISK_GEN_ALL_CORE
For information on Fall & Injury Prevention, visit: [email or web address] OR  [email or web address] OR  https://www.cdc.gov/steadi/patient.html

## 2023-06-29 NOTE — PROGRESS NOTE ADULT - ASSESSMENT
47yFemale patient S/P kareem rohini, 6/28    Plan:  - Pain control PRN  - Diet: regular  - Activity: OOB  - DVT ppx: SCD's & lovenox  - dc planning today    ACS [id number]

## 2023-06-29 NOTE — DISCHARGE NOTE PROVIDER - CARE PROVIDER_API CALL
Yamileth Gilbert  Surgical Critical Care  [address] [place] [address], [address]  [place] [address]  Phone: [phone]  Fax: [phone]  Follow Up Time: 2 weeks

## 2023-06-29 NOTE — DISCHARGE NOTE PROVIDER - NSDCMRMEDTOKEN_GEN_ALL_CORE_FT
diclofenac sodium 75 mg oral delayed release tablet: 1 orally  gabapentin 100 mg oral tablet: 2 tab(s) orally once a day (in the afternoon)  gabapentin 100 mg oral tablet: 3 tab(s) orally once a day (at bedtime)  gabapentin 100 mg oral tablet: 3 tab(s) orally once a day (in the morning)  oxyCODONE 5 mg oral capsule: 1 cap(s) orally every 4 hours as needed for  pain MDD: 8

## 2023-06-29 NOTE — PROGRESS NOTE ADULT - SUBJECTIVE AND OBJECTIVE BOX
ACS SURGERY DAILY PROGRESS NOTE:     SUBJECTIVE/ROS: Patient seen and examined. She denies abdominal pain, N/V. Pain is controlled.      MEDICATIONS  (STANDING):  acetaminophen   IVPB .. 1000 milliGRAM(s) IV Intermittent every 6 hours  enoxaparin Injectable 40 milliGRAM(s) SubCutaneous every 24 hours  gabapentin 200 milliGRAM(s) Oral <User Schedule>  gabapentin 300 milliGRAM(s) Oral <User Schedule>    MEDICATIONS  (PRN):  ondansetron Injectable 4 milliGRAM(s) IV Push every 6 hours PRN Nausea and/or Vomiting  oxyCODONE    IR 2.5 milliGRAM(s) Oral every 6 hours PRN Moderate Pain (4 - 6)  oxyCODONE    IR 5 milliGRAM(s) Oral every 6 hours PRN Severe Pain (7 - 10)      OBJECTIVE:    Vital Signs Last 24 Hrs  T(C): 37.6 (2023 06:03), Max: 37.6 (2023 06:03)  T(F): 99.7 (2023 06:03), Max: 99.7 (2023 06:03)  HR: 90 (2023 06:03) (65 - 90)  BP: 128/71 (2023 06:03) (109/70 - 146/74)  BP(mean): 80 (2023 17:30) (78 - 101)  RR: 18 (2023 06:03) (16 - 18)  SpO2: 96% (2023 06:03) (95% - 100%)    Parameters below as of 2023 06:03  Patient On (Oxygen Delivery Method): room air            I&O's Detail    2023 07:01  -  2023 07:00  --------------------------------------------------------  IN:    Lactated Ringers: 150 mL    Oral Fluid: 140 mL    sodium chloride 0.9%: 319 mL  Total IN: 609 mL    OUT:    Voided (mL): 1550 mL  Total OUT: 1550 mL    Total NET: -941 mL          Daily Height in cm: 149.86 (2023 11:36)    Daily     LABS:                        12.6   10.22 )-----------( 217      ( 2023 06:59 )             39.0         136  |  102  |  10  ----------------------------<  100<H>  4.4   |  22  |  0.91    Ca    9.2      2023 00:55    TPro  7.7  /  Alb  4.1  /  TBili  0.2  /  DBili  x   /  AST  30  /  ALT  32  /  AlkPhos  70        Urinalysis Basic - ( 2023 02:20 )    Color: Colorless / Appearance: Clear / S.004 / pH: x  Gluc: x / Ketone: Negative  / Bili: Negative / Urobili: Negative   Blood: x / Protein: Negative / Nitrite: Negative   Leuk Esterase: Negative / RBC: x / WBC x   Sq Epi: x / Non Sq Epi: x / Bacteria: x                PHYSICAL EXAM:  Physical Examination:  General: NAD, resting comfortably in bed  HEENT: Normocephalic atraumatic  Respiratory: Nonlabored respirations, normal CW expansion.  Cardio: S1S2, regular rate and rhythm.  Abdomen: softly distended, appropriately tender, surgical incisions are c/d/i.  Vascular: extremities are warm and well perfused.          Dr. Sol

## 2023-06-29 NOTE — DISCHARGE NOTE PROVIDER - HOSPITAL COURSE
47 year old female with hx of , chiari malformation, presents with abdominal pain x1 day. Was recently admitted to surgery service on  due to acute cholecystitis, however due to [employer] reasons, deferred surgery and wanted to trial antibiotics. Was sent home with 1 week augmentin, which she has been compliant with. Was without    symptoms until today around 5PM when she developed severe RUQ pain, worse than before and radiating to right back. Denies fever, chills, nausea, vomiting, CP or SOB. presents with recurrent pain, now amenable to surgery on admission.     On  Patient underwent laparoscopic cholecystectomy. The patient tolerated the procedure well without complications, was extubated, and transferred to the PACU in stable condition. Diet was advanced as tolerated and patient treated with pain control. On day of discharge, the patient was tolerating diet, ambulating well and pain controlled. Patient to follow with Dr. Gilbert or one of her partners in 1-2 weeks.

## 2023-06-29 NOTE — DISCHARGE NOTE NURSING/CASE MANAGEMENT/SOCIAL WORK - PATIENT PORTAL LINK FT
You can access the FollowMyHealth Patient Portal offered by [facility] by registering at the following website: [email or web address]. By joining [employer]’s FollowMyHealth portal, you will also be able to view your health information using other applications (apps) compatible with our system.

## 2023-06-29 NOTE — DISCHARGE NOTE PROVIDER - NSDCCPTREATMENT_GEN_ALL_CORE_FT
PRINCIPAL PROCEDURE  Procedure: Laparoscopic cholecystectomy  Findings and Treatment: WOUND CARE: You may shower. Pat Dry abdomen. Leave the white steri strips in place, they will fall off on their own in approximately 5-7 days.  BATHING: Please do not submerge wound underwater. You may shower and/or sponge bathe.  ACTIVITY: No heavy lifting anything more than 10-15lbs or straining. Otherwise, you may return to your usual level of physical activity. If you are taking narcotic pain medication (such as Percocet), do NOT drive a car, operate machinery or make important decisions.  DIET: regular diet  NOTIFY YOUR SURGEON IF: You have any bleeding that does not stop, any pus draining from your wound, any fever (over 100.4 F) or chills, persistent nausea/vomiting with inability to tolerate food or liquids, persistent diarrhea, or if your pain is not controlled on your discharge pain medications.  FOLLOW-UP:  1. Please call to make a follow-up appointment within one week of discharge   2. Please follow up with your primary care physician in one week regarding your hospitalization.  You can follow up with Dr. Yamileth Gilbert or one of her partners.   you can take oral tyelonol 650 mg every 6 hours for pain in addition to oxycodone.

## 2023-06-29 NOTE — DISCHARGE NOTE PROVIDER - DISCHARGE SERVICE FOR PATIENT
No on the discharge service for the patient. I have reviewed and made amendments to the documentation where necessary.

## 2023-06-29 NOTE — DISCHARGE NOTE PROVIDER - NSDCFUSCHEDAPPT_GEN_ALL_CORE_FT
D[facility], [facility]  [address]  Scheduled Appointment: 07/14/2023

## 2023-06-30 ENCOUNTER — NON-APPOINTMENT (OUTPATIENT)
Age: 48
End: 2023-06-30

## 2023-07-07 LAB — SURGICAL PATHOLOGY STUDY: SIGNIFICANT CHANGE UP

## 2023-07-10 NOTE — DISCHARGE NOTE PROVIDER - NSDCDCMDCOMP_GEN_ALL_CORE
Infusion Nursing Note:  Mauro Irizarry presents today for 2/3 Venofer    Patient seen by provider today: No   present during visit today: Not Applicable.    Note: Mauro arrived into the infusion center accompanied by her  in a stable condition, VSS. Plan of care reviewed,  she verbalized understanding of her plan of care and return to clinic.She tolerated the first dose well and acknowledged she  is feeling better and not as tired as she was before the Iron infusion.    Intravenous Access:  Peripheral IV placed.    Treatment Conditions:  Not Applicable.    Post Infusion Assessment:  Patient tolerated infusion without incident.  Patient observed for 30  minutes post Iron Infusion per protocol.  Site patent and intact, free from redness, edema or discomfort.  No evidence of extravasations.  Access discontinued per protocol.     Discharge Plan:   Discharge instructions reviewed with: Patient.  Patient and/or family verbalized understanding of discharge instructions and all questions answered.  Patient discharged in stable condition accompanied by: .  Departure Mode: Ambulatory.    Sherry Barillas RN  
This document is complete and the patient is ready for discharge.

## 2023-07-12 ENCOUNTER — APPOINTMENT (OUTPATIENT)
Dept: TRAUMA SURGERY | Facility: CLINIC | Age: 48
End: 2023-07-12
Payer: MEDICAID

## 2023-07-12 DIAGNOSIS — Z90.49 ACQUIRED ABSENCE OF OTHER SPECIFIED PARTS OF DIGESTIVE TRACT: ICD-10-CM

## 2023-07-12 PROCEDURE — 99024 POSTOP FOLLOW-UP VISIT: CPT

## 2023-07-14 ENCOUNTER — APPOINTMENT (OUTPATIENT)
Dept: PAIN MANAGEMENT | Facility: CLINIC | Age: 48
End: 2023-07-14

## 2023-07-25 PROBLEM — R51.9 HEADACHE: Status: ACTIVE | Noted: 2019-04-29

## 2023-07-25 PROBLEM — M54.2 MYOFASCIAL NECK PAIN: Status: ACTIVE | Noted: 2023-05-11

## 2023-07-25 NOTE — PHYSICAL EXAM
[General Appearance - Alert] : alert [General Appearance - In No Acute Distress] : in no acute distress [General Appearance - Well Nourished] : well nourished [General Appearance - Well Developed] : well developed [Oriented To Time, Place, And Person] : oriented to person, place, and time [Impaired Insight] : insight and judgment were intact [Affect] : the affect was normal [Person] : oriented to person [Place] : oriented to place [Time] : oriented to time [Concentration Intact] : normal concentrating ability [Visual Intact] : visual attention was ~T not ~L decreased [Fluency] : fluency intact [Comprehension] : comprehension intact [Cranial Nerves Optic (II)] : visual acuity intact bilaterally,  visual fields full to confrontation, pupils equal round and reactive to light [Cranial Nerves Oculomotor (III)] : extraocular motion intact [Cranial Nerves Trigeminal (V)] : facial sensation intact symmetrically [Cranial Nerves Facial (VII)] : face symmetrical [Cranial Nerves Vestibulocochlear (VIII)] : hearing was intact bilaterally [Cranial Nerves Glossopharyngeal (IX)] : tongue and palate midline [Cranial Nerves Accessory (XI - Cranial And Spinal)] : head turning and shoulder shrug symmetric [Cranial Nerves Hypoglossal (XII)] : there was no tongue deviation with protrusion [Motor Strength] : muscle strength was normal in all four extremities [Motor Tone] : muscle tone was normal in all four extremities [No Muscle Atrophy] : normal bulk in all four extremities [Motor Handedness Right-Handed] : the patient is right hand dominant [Sensation Tactile Decrease] : light touch was intact [Sensation Pain / Temperature Decrease] : pain and temperature was intact [Abnormal Walk] : normal gait [Balance] : balance was intact [Sclera] : the sclera and conjunctiva were normal [Extraocular Movements] : extraocular movements were intact [Outer Ear] : the ears and nose were normal in appearance [Hearing Threshold Finger Rub Not [place]] : hearing was normal [Oropharynx] : the oropharynx was normal [Neck Appearance] : the appearance of the neck was normal [Nail Clubbing] : no clubbing  or cyanosis of the fingernails [Skin Color & Pigmentation] : normal skin color and pigmentation [] : no rash [FreeTextEntry1] : Neurocognitive Examination Functionality Questionnaire\par \par Relationship: Self\par Frequency of interactions in the past month: Daily / Lives with\par \par [place] Index of [place] in Activities of Daily Living:\par 1. Bathing/Showerin\par 2. Dressin\par 3. Toiletin\par 4. Transferrin\par 5. Continence: 1\par 6: Feedin\par \par TOTAL: 6\par \par \par [place] Instrumental Activities of Daily Living:\par A. Ability to Use Telephone: 1\par B. Shoppin\par C. Food Preparation: 1\par D. Housekeepin (light daily tasks due to neck pain)\par E. Laundry: 1\par F. Transportation: 1\par G. Responsibility for Own Medications: 1\par H: Ability to Handle Finances: 1\par \par TOTAL: 8\par  [Paresis Pronator Drift Right-Sided] : no pronator drift on the right [Paresis Pronator Drift Left-Sided] : no pronator drift on the left [Motor Strength Upper Extremities Bilaterally] : strength was normal in both upper extremities [Motor Strength Lower Extremities Bilaterally] : strength was normal in both lower extremities [Romberg's Sign] : Romberg's sign was negtive [Allodynia] : no ~T allodynia present [Hyperesthesia] : no hyperesthesia [Past-pointing] : there was no past-pointing [Tremor] : no tremor present [Dysdiadochokinesia Bilaterally] : not present [Coordination - Dysmetria Impaired Finger-to-Nose Bilateral] : not present [Coordination - Dysmetria Impaired Heel-to-Shin Bilateral] : not present [FreeTextEntry4] : Immediate and 5-minute delayed recall: 3/3. [FreeTextEntry8] : Normal, narrow-based gait. Specialized gait testing deferred by patient.

## 2023-07-25 NOTE — HISTORY OF PRESENT ILLNESS
[FreeTextEntry1] : FROM 5/7/19:\par This is a 43-year-old right-handed woman who has had new dull headaches over the past month, radiating to the neck as a burning sensation, relieved by over-the-counter acetaminophen. On April 28, 2019, the headache became very severe, feeling like "squeezing" at both sides of the head, associated with nausea and and "weak" feeling in the eyes. She presented to the [facility] ED and had a CT Head that raised concern for low-lying cerebellar tonsils. She was treated with some pain medications and was discharged, although the same headache with nausea and "weak" eyes occurred last week. Even when she does not have a headache, she feels weak in the legs, especially when walking up and down stairs.\par \par FROM 8/19/19:\par The patient states that she since she started taking amitriptyline and PRN metoclopramide, her headaches and neck pain have decreased in frequency, but she still sometimes experiences sharp pains at the right side of her head and pressure in her neck, but this happens up to 1-2 times per week, and not every day as before. She notes that stress at home or at work tends to trigger or aggravate these aches. She finds that metoclopramide makes her tired during the day, so she does not take that often. Since the last clinic visit, she has also noticed some mild burning pain in both of her calves when she walks, but this has improved since she started taking magnesium 500mg daily.\par \par FROM 5/21/20:\par This appointment is conducted via real-time two-way audiovisual technology due to the current COVID-19 pandemic. Verbal consent for this encounter was received by the patient. The patient was located at her home address, and I was located in [place], [place]. The patient, now 44, notes that in March and April 2020, she had symptoms of lack of smell, lack of taste, cough, and fever, and thinks she and other relatives may have been infected with COVID-19, but they were not able to get tested for it. In the middle of all that, she stopped taking amitriptyline and PRN metoclopramide as back, and she has had recurrence in the past 4-5 days of posterior headache with burning sensation, neck pain, and radiation of pain to her right shoulder and right chest, described as a soreness. Even when she was taking the amitriptyline, she would experience excessive fatigue during the day after each 10mg nighttime dose, and therefore is not eager to resume it. She notes that she presented to the ED in December 2019 for lower abdominal pain and was diagnosed with urinary tract infection; she was treated with antibiotics and improved at home.\par \par FROM 8/21/20:\par Since the last clinic visit, the patient has not had severe pain, and had used cyclobenzaprine 5mg PO QHS since the last visit on 5/21/20. However, she has not needed to use it on a standing basis since then, and only uses it as needed. She recounts that she had one episode of left-sided chest discomfort over the course of one day since I last saw her, but it resolved without her seeking medical attention, and she attributes it to stress. Otherwise, she has not had any headache, neck pain, or other body pain.\par \par FROM 6/22/21:\par This appointment is conducted via real-time two-way audiovisual technology due to the current COVID-19 pandemic, and continued via telephone due to technical difficulties. Verbal consent for this encounter was received by the patient. The patient, now 45, was located at her home address, and I was located in [place], [place]. The patient has been inconsistent about taking the medications I recommended, and she has not had any significant headaches for most of the time since I last saw her on 8/21/20. On 6/6/21, she was playing with family at her home and fell backwards on concrete when she thought there was a chair present, and hit the back of her head. She had pain, swelling, and burning sensation at the back of her head, but no loss of consciousness or change in vision. She presented to the [facility] ED on 6/7/21, where a CT Head showed no acute fracture or intracranial abnormality, only a stable Chiari I malformation. Since then, she has had intermittent feelings of burning at the back of her head, although the swelling from her head injury has subsided.\par \par FROM 7/21/21:\par This appointment is conducted via real-time two-way audiovisual technology due to the current COVID-19 pandemic. Verbal consent for this encounter was received by the patient. The patient was located at her home address, and I was located in [place], [place]. Since our last appointment on 6/22/21, the patient has completed a methylprednisolone dose pack and has started amitriptyline 10mg PO QHS, although she has not further titrated this to 30mg PO QHS. She has also used ibuprofen 600mg as needed for breakthrough headaches. She has had improvement in the frequency and intensity of headaches and posterior head burning sensations since then.\par \par FROM 11/5/21:\par This appointment is conducted via real-time two-way audiovisual technology due to the current COVID-19 pandemic. Verbal consent for this encounter was received by the patient. The patient was located at the home address, and I was located in [place], [place]. Since the last appointment on 7/21/21, I spoke with the patient by telephone on 10/14/21, when she reported having a flare-up of posterior headaches with burning sensations occurring 3 times per week. At that point, she was taking amitriptyline 10mg instead of 30mg PO QHS because drowsiness during the following morning. She completed a methylprednisolone dose pack, and her headaches have resolved without recurrence. She currently takes amitriptyline 10mg every other night. She denies any new pain in the neck, shoulders, or arms. She reports having poorer vision than before, and has an appointment scheduled with an ophthalmologist for further evaluation and management.\par \par FROM 4/12/22:\par This appointment is conducted via real-time two-way audiovisual technology due to the current COVID-19 pandemic. Verbal consent for this encounter was received by the patient. The patient was located at her home address, and I was located in [place], [place]. The patient, now 46, has been doing well overall while on Amitriptyline 10-15 mg at 7pm each night, but since 3/26/22, she has stopped taking it because it makes her very tired and she has had to help take care of her son in the hospital. She has noticed new problems with decreased focus and short-term memory, which makes her worried about driving because she can forget directions.\par \par FROM 7/25/22:\par This appointment is conducted via real-time two-way audiovisual technology due to the current COVID-19 pandemic. Verbal consent for this encounter was received by the patient. The patient was located at her home address, and I was located in [place], [place]. She has been tolerating Duloxetine 20mg daily at bedtime without adverse effects. The patient states that she sometimes has pain at the back of the neck, especially when stressed or doing a lot of work, but otherwise has not had severe headaches as she had before. She presented to the [facility] ED on 7/1/22 for urinary tract infection with associated lower abdominal and back pain, and was discharged with a course of cephalexin. She states that her focus and short-term memory are still not like they used to be, and that she can have a short temper at times, but these are not as bad as they were when I last saw her on 4/12/22. She continues to have poor near vision, requiring reading glasses.\par \par FROM 10/24/22:\par This appointment is conducted via real-time two-way audiovisual technology due to the current COVID-19 pandemic. Verbal consent for this encounter was received by the patient. The patient was located at her home address, and I was located in [place], [place]. She states that she has not yet started taking Duloxetine 20mg Daily, and states that she has not had recent severe headaches.\par \par FROM 4/27/23:\par This appointment is conducted via real-time two-way audiovisual technology to accommodate an urgent follow-up appointment during the current COVID-19 pandemic. Verbal consent for this encounter was received by the patient. The patient was located at her home address, and I was located in [place], [place]. The patient, now 47, reports having recurrence of pressure-like pain at the back of her head and neck over at least the past week, despite taking Duloxetine 20mg PO Daily as previously prescribed.\par \par FROM 6/15/23:\par This appointment is conducted via real-time two-way audiovisual technology, and continued via telephone due to technical difficulties, during the current COVID-19 pandemic. Verbal consent for this encounter was received by the patient. The patient was located at her home address, and I was located in [place], [place]. Since I last saw the patient on 4/27/23, she has changed her Gabapentin intake to 100mg x 2 (200mg) PO TID - she is avoiding the 300mg dose due to associated fatigue with this dose in the past. She has visited Pain Management twice and has received trigger point injections at both shoulders, which have provided minimal benefit. She felt anxious about receiving posterior head and neck trigger point injections, so she has declined these. She has started physical therapy sessions twice per week to relieve myofascial neck pain.

## 2023-07-25 NOTE — ASSESSMENT
[FreeTextEntry1] : 47 RHF with exacerbation of headaches and myofascial neck pain, also with history of nausea and paresthesias, in the setting of Chiari I malformation, with exacerbation of symptoms in the setting of post-traumatic headache, also with effects on attention and mood but without evidence of mild cognitive impairment.

## 2023-07-25 NOTE — DATA REVIEWED
[FreeTextEntry1] : MRI Brain & MRA Neck (5/15/19):\par - No acute intracranial abnormality\par - Chiari I malformation\par - No neck vessel abnormalities\par \par MRA Brain & MRI Cervical Spine (5/29/19):\par - No intracranial vessel abnormalities\par - Chiari I malformation without syrinx\par - Mild multilevel cervical spondylosis\par \par CT Head (4/28/19):\par - Low-lying cerebellar tonsils, 4-5 mm below level of foramen magnum\par \par CT Head ([facility] ED, 6/7/21):\par - No acute fracture or intracranial abnormalities\par - Chiari I malformation (stable)\par \par MRI Brain & Cervical Spine (5/2/23): Per reports,\par - Stable mild cerebellar tonsillar ectopia without compression of cervicomedullary junction or syringomyelia\par - Multilevel cervical disc bulges with moderate neuroforaminal stenoses and impingement of left C6 nerve root and bilateral C7 nerve roots

## 2023-08-09 NOTE — HISTORY OF PRESENT ILLNESS
[de-identified] : \par Taryn Dumont is a 47F POD 14 s/p lap cholecystomy.  [de-identified] : She was discharged home on POD1 and has done well postoperatively. \par She reports no pain and is taking no pain medications. \par She is tolerating her regular diet and having regular, normal bowel movements. \par She denies fever, chills, nausea, vomiting, diarrhea, and otherwise has no complaints other than a little bit of itchiness \par at some of the port sites which still have steristrips. \par She is planning on traveling to [facility] in 4 days and is requesting a return to work note. 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.\par - Will provide return to work note.\par - No further surgical needs at this time. \par - No further follow-up needed, Patient can return to clinic prn. \par \par

## 2023-08-18 NOTE — ED ADULT TRIAGE NOTE - TEMPERATURE IN FAHRENHEIT (DEGREES F)
"Phone Number Called: [phone]    Call outcome: Spoke to patient regarding message below.    Message: Called to discuss concerns and symptoms.   Patient recently started taking Multaq couple weeks ago and really bad acid reflex after eating breakfast.   Feels like she is having \"skipped beat, every other beat\"  Denies any missed doses.  Not taking Flec, due to trailing Multaq.  Denies triggers.  BP - no recent readings.   Having SOB while when PVC's are occurring.   Denies Dizziness, CP.  ER precautions discusses, patient verbalized understanding.   Will continue current meds until f/u appt.   Will reach out to SS for recommendations.   " 98.1

## 2023-08-21 ENCOUNTER — LABORATORY RESULT (OUTPATIENT)
Age: 48
End: 2023-08-21

## 2023-08-21 ENCOUNTER — APPOINTMENT (OUTPATIENT)
Dept: OBGYN | Facility: CLINIC | Age: 48
End: 2023-08-21
Payer: MEDICAID

## 2023-08-21 VITALS
DIASTOLIC BLOOD PRESSURE: 70 MMHG | SYSTOLIC BLOOD PRESSURE: 130 MMHG | BODY MASS INDEX: 25.4 KG/M2 | WEIGHT: 126 LBS | HEIGHT: 59 IN

## 2023-08-21 DIAGNOSIS — B37.31 ACUTE CANDIDIASIS OF VULVA AND VAGINA: ICD-10-CM

## 2023-08-21 LAB
BILIRUB UR QL STRIP: NORMAL
GLUCOSE UR-MCNC: NORMAL
HCG UR QL: 0.2 EU/DL
HGB UR QL STRIP.AUTO: NORMAL
KETONES UR-MCNC: NORMAL
LEUKOCYTE ESTERASE UR QL STRIP: NORMAL
NITRITE UR QL STRIP: NORMAL
PH UR STRIP: 6
PROT UR STRIP-MCNC: NORMAL
SP GR UR STRIP: 1.01

## 2023-08-21 PROCEDURE — 99214 OFFICE O/P EST MOD 30 MIN: CPT

## 2023-08-21 NOTE — PHYSICAL EXAM
[Normal] : uterus [Discharge] : a  ~M vaginal discharge was present [Moderate] : moderate [Foul Smelling] : not foul smelling [White] : white [Thick] : thick [No Bleeding] : there was no active vaginal bleeding [Uterine Adnexae] : were not tender and not enlarged

## 2023-08-21 NOTE — HISTORY OF PRESENT ILLNESS
[Discharge] : discharge [] : the vaginal discharge is described as [Yellow-green, Thick Discharge] : yellow-green and thick [Definite:  ___ (Date)] : the last menstrual period was [unfilled] [Normal Amount/Duration] : was of a normal amount and duration [Spotting Between  Menses] : no spotting between menses [Regular Cycle Intervals] : periods have been regular [Menstrual Cramps] : no menstrual cramps

## 2023-08-22 LAB
CULTURE RESULTS: SIGNIFICANT CHANGE UP
SPECIMEN SOURCE: SIGNIFICANT CHANGE UP

## 2023-08-28 ENCOUNTER — APPOINTMENT (OUTPATIENT)
Dept: INTERNAL MEDICINE | Facility: CLINIC | Age: 48
End: 2023-08-28
Payer: MEDICAID

## 2023-08-28 DIAGNOSIS — J06.9 ACUTE UPPER RESPIRATORY INFECTION, UNSPECIFIED: ICD-10-CM

## 2023-08-28 PROCEDURE — 99213 OFFICE O/P EST LOW 20 MIN: CPT | Mod: 95

## 2023-08-28 NOTE — REVIEW OF SYSTEMS
[Chills] : chills [Shortness Of Breath] : shortness of breath [Cough] : cough [Abdominal Pain] : no abdominal pain [Nausea] : no nausea [Diarrhea] : diarrhea [Vomiting] : no vomiting

## 2023-08-28 NOTE — HISTORY OF PRESENT ILLNESS
[Home] : at home, [unfilled] , at the time of the visit. [Medical Office: ([facility])___] : at the medical office located in  [FreeTextEntry8] : 47 year F with PMH Chiari I malformation, chronic migraines presenting for HA, chills, sore throat, cough.  Symptoms started Friday after being stuck in the rain; some improvement in symptoms. No recent travel. No sick contacts. Denied fevers

## 2023-08-28 NOTE — ASSESSMENT
[FreeTextEntry1] : 47 year F with PMH Chiari I malformation, chronic migraines presenting for HA, chills, sore throat, cough.  #URI - symptoms consistent with URI - check COVID and RVP - c/w supportive measures and rx'd tessalon perles  - counseled to make in-person appointment if COVID negative and no improvement in symptoms next week - counseled on precautions to present to ED  Case d/w with Dr. Lo 
pacu

## 2023-09-11 ENCOUNTER — APPOINTMENT (OUTPATIENT)
Dept: ULTRASOUND IMAGING | Facility: CLINIC | Age: 48
End: 2023-09-11
Payer: MEDICAID

## 2023-09-11 ENCOUNTER — RESULT REVIEW (OUTPATIENT)
Age: 48
End: 2023-09-11

## 2023-09-11 DIAGNOSIS — N76.0 ACUTE VAGINITIS: ICD-10-CM

## 2023-09-11 DIAGNOSIS — B96.89 ACUTE VAGINITIS: ICD-10-CM

## 2023-09-11 PROCEDURE — 76830 TRANSVAGINAL US NON-OB: CPT

## 2023-09-11 PROCEDURE — 76856 US EXAM PELVIC COMPLETE: CPT

## 2023-09-12 ENCOUNTER — APPOINTMENT (OUTPATIENT)
Dept: OBGYN | Facility: CLINIC | Age: 48
End: 2023-09-12
Payer: MEDICAID

## 2023-09-12 ENCOUNTER — OUTPATIENT (OUTPATIENT)
Dept: OUTPATIENT SERVICES | Facility: HOSPITAL | Age: 48
LOS: 1 days | End: 2023-09-12
Payer: MEDICAID

## 2023-09-12 VITALS — SYSTOLIC BLOOD PRESSURE: 130 MMHG | DIASTOLIC BLOOD PRESSURE: 80 MMHG | WEIGHT: 125 LBS | BODY MASS INDEX: 25.25 KG/M2

## 2023-09-12 DIAGNOSIS — B37.31 ACUTE CANDIDIASIS OF VULVA AND VAGINA: ICD-10-CM

## 2023-09-12 DIAGNOSIS — R10.2 PELVIC AND PERINEAL PAIN: ICD-10-CM

## 2023-09-12 DIAGNOSIS — N76.0 ACUTE VAGINITIS: ICD-10-CM

## 2023-09-12 LAB
BILIRUB UR QL STRIP: NORMAL
GLUCOSE UR-MCNC: NORMAL
HCG UR QL: 0.2 EU/DL
HGB UR QL STRIP.AUTO: NORMAL
KETONES UR-MCNC: NORMAL
LEUKOCYTE ESTERASE UR QL STRIP: NORMAL
NITRITE UR QL STRIP: NORMAL
PH UR STRIP: 6
PROT UR STRIP-MCNC: NORMAL
SP GR UR STRIP: 1

## 2023-09-12 PROCEDURE — 81002 URINALYSIS NONAUTO W/O SCOPE: CPT

## 2023-09-12 PROCEDURE — 99214 OFFICE O/P EST MOD 30 MIN: CPT

## 2023-09-12 PROCEDURE — G0463: CPT

## 2023-09-15 ENCOUNTER — EMERGENCY (EMERGENCY)
Facility: HOSPITAL | Age: 48
LOS: 1 days | Discharge: HOME | End: 2023-09-15
Attending: EMERGENCY MEDICINE
Payer: MEDICAID

## 2023-09-15 VITALS
OXYGEN SATURATION: 97 % | TEMPERATURE: 98 F | WEIGHT: 126.1 LBS | RESPIRATION RATE: 20 BRPM | HEART RATE: 59 BPM | HEIGHT: 61 IN | SYSTOLIC BLOOD PRESSURE: 147 MMHG | DIASTOLIC BLOOD PRESSURE: 82 MMHG

## 2023-09-15 VITALS
RESPIRATION RATE: 16 BRPM | DIASTOLIC BLOOD PRESSURE: 65 MMHG | SYSTOLIC BLOOD PRESSURE: 125 MMHG | OXYGEN SATURATION: 99 % | TEMPERATURE: 98 F | HEART RATE: 65 BPM

## 2023-09-15 DIAGNOSIS — Z90.49 ACQUIRED ABSENCE OF OTHER SPECIFIED PARTS OF DIGESTIVE TRACT: Chronic | ICD-10-CM

## 2023-09-15 LAB
ALBUMIN SERPL ELPH-MCNC: 4.4 G/DL — SIGNIFICANT CHANGE UP (ref 3.3–5)
ALP SERPL-CCNC: 87 U/L — SIGNIFICANT CHANGE UP (ref 40–120)
ALT FLD-CCNC: 12 U/L — SIGNIFICANT CHANGE UP (ref 10–45)
ANION GAP SERPL CALC-SCNC: 13 MMOL/L — SIGNIFICANT CHANGE UP (ref 5–17)
APPEARANCE UR: CLEAR — SIGNIFICANT CHANGE UP
AST SERPL-CCNC: 18 U/L — SIGNIFICANT CHANGE UP (ref 10–40)
BACTERIA # UR AUTO: NEGATIVE — SIGNIFICANT CHANGE UP
BASE EXCESS BLDV CALC-SCNC: 1.9 MMOL/L — SIGNIFICANT CHANGE UP (ref -2–3)
BASOPHILS # BLD AUTO: 0.05 K/UL — SIGNIFICANT CHANGE UP (ref 0–0.2)
BASOPHILS NFR BLD AUTO: 0.6 % — SIGNIFICANT CHANGE UP (ref 0–2)
BILIRUB SERPL-MCNC: 0.2 MG/DL — SIGNIFICANT CHANGE UP (ref 0.2–1.2)
BILIRUB UR-MCNC: NEGATIVE — SIGNIFICANT CHANGE UP
BUN SERPL-MCNC: 10 MG/DL — SIGNIFICANT CHANGE UP (ref 7–23)
CA-I SERPL-SCNC: 1.27 MMOL/L — SIGNIFICANT CHANGE UP (ref 1.15–1.33)
CALCIUM SERPL-MCNC: 10.1 MG/DL — SIGNIFICANT CHANGE UP (ref 8.4–10.5)
CHLORIDE BLDV-SCNC: 101 MMOL/L — SIGNIFICANT CHANGE UP (ref 96–108)
CHLORIDE SERPL-SCNC: 102 MMOL/L — SIGNIFICANT CHANGE UP (ref 96–108)
CO2 BLDV-SCNC: 32 MMOL/L — HIGH (ref 22–26)
CO2 SERPL-SCNC: 24 MMOL/L — SIGNIFICANT CHANGE UP (ref 22–31)
COLOR SPEC: COLORLESS — SIGNIFICANT CHANGE UP
CREAT SERPL-MCNC: 0.83 MG/DL — SIGNIFICANT CHANGE UP (ref 0.5–1.3)
DIFF PNL FLD: NEGATIVE — SIGNIFICANT CHANGE UP
EGFR: 87 ML/MIN/1.73M2 — SIGNIFICANT CHANGE UP
EOSINOPHIL # BLD AUTO: 0.42 K/UL — SIGNIFICANT CHANGE UP (ref 0–0.5)
EOSINOPHIL NFR BLD AUTO: 5 % — SIGNIFICANT CHANGE UP (ref 0–6)
EPI CELLS # UR: 3 /HPF — SIGNIFICANT CHANGE UP
GAS PNL BLDV: 133 MMOL/L — LOW (ref 136–145)
GAS PNL BLDV: SIGNIFICANT CHANGE UP
GAS PNL BLDV: SIGNIFICANT CHANGE UP
GLUCOSE BLDV-MCNC: 85 MG/DL — SIGNIFICANT CHANGE UP (ref 70–99)
GLUCOSE SERPL-MCNC: 84 MG/DL — SIGNIFICANT CHANGE UP (ref 70–99)
GLUCOSE UR QL: NEGATIVE — SIGNIFICANT CHANGE UP
HCG SERPL-ACNC: <2 MIU/ML — SIGNIFICANT CHANGE UP
HCO3 BLDV-SCNC: 30 MMOL/L — HIGH (ref 22–29)
HCT VFR BLD CALC: 42.9 % — SIGNIFICANT CHANGE UP (ref 34.5–45)
HCT VFR BLDA CALC: 43 % — SIGNIFICANT CHANGE UP (ref 34.5–46.5)
HGB BLD CALC-MCNC: 14.4 G/DL — SIGNIFICANT CHANGE UP (ref 11.7–16.1)
HGB BLD-MCNC: 13.8 G/DL — SIGNIFICANT CHANGE UP (ref 11.5–15.5)
HYALINE CASTS # UR AUTO: 1 /LPF — SIGNIFICANT CHANGE UP (ref 0–2)
IMM GRANULOCYTES NFR BLD AUTO: 0.4 % — SIGNIFICANT CHANGE UP (ref 0–0.9)
KETONES UR-MCNC: NEGATIVE — SIGNIFICANT CHANGE UP
LACTATE BLDV-MCNC: 1.1 MMOL/L — SIGNIFICANT CHANGE UP (ref 0.5–2)
LEUKOCYTE ESTERASE UR-ACNC: ABNORMAL
LIDOCAIN IGE QN: 53 U/L — SIGNIFICANT CHANGE UP (ref 7–60)
LYMPHOCYTES # BLD AUTO: 2.61 K/UL — SIGNIFICANT CHANGE UP (ref 1–3.3)
LYMPHOCYTES # BLD AUTO: 30.9 % — SIGNIFICANT CHANGE UP (ref 13–44)
MCHC RBC-ENTMCNC: 27 PG — SIGNIFICANT CHANGE UP (ref 27–34)
MCHC RBC-ENTMCNC: 32.2 GM/DL — SIGNIFICANT CHANGE UP (ref 32–36)
MCV RBC AUTO: 83.8 FL — SIGNIFICANT CHANGE UP (ref 80–100)
MONOCYTES # BLD AUTO: 0.61 K/UL — SIGNIFICANT CHANGE UP (ref 0–0.9)
MONOCYTES NFR BLD AUTO: 7.2 % — SIGNIFICANT CHANGE UP (ref 2–14)
NEUTROPHILS # BLD AUTO: 4.73 K/UL — SIGNIFICANT CHANGE UP (ref 1.8–7.4)
NEUTROPHILS NFR BLD AUTO: 55.9 % — SIGNIFICANT CHANGE UP (ref 43–77)
NITRITE UR-MCNC: NEGATIVE — SIGNIFICANT CHANGE UP
NRBC # BLD: 0 /100 WBCS — SIGNIFICANT CHANGE UP (ref 0–0)
PCO2 BLDV: 59 MMHG — HIGH (ref 39–42)
PH BLDV: 7.31 — LOW (ref 7.32–7.43)
PH UR: 6.5 — SIGNIFICANT CHANGE UP (ref 5–8)
PLATELET # BLD AUTO: 245 K/UL — SIGNIFICANT CHANGE UP (ref 150–400)
PO2 BLDV: 19 MMHG — LOW (ref 25–45)
POTASSIUM BLDV-SCNC: 4.5 MMOL/L — SIGNIFICANT CHANGE UP (ref 3.5–5.1)
POTASSIUM SERPL-MCNC: 4.4 MMOL/L — SIGNIFICANT CHANGE UP (ref 3.5–5.3)
POTASSIUM SERPL-SCNC: 4.4 MMOL/L — SIGNIFICANT CHANGE UP (ref 3.5–5.3)
PROT SERPL-MCNC: 8.6 G/DL — HIGH (ref 6–8.3)
PROT UR-MCNC: NEGATIVE — SIGNIFICANT CHANGE UP
RBC # BLD: 5.12 M/UL — SIGNIFICANT CHANGE UP (ref 3.8–5.2)
RBC # FLD: 13 % — SIGNIFICANT CHANGE UP (ref 10.3–14.5)
RBC CASTS # UR COMP ASSIST: 5 /HPF — HIGH (ref 0–4)
SAO2 % BLDV: 20.8 % — LOW (ref 67–88)
SODIUM SERPL-SCNC: 139 MMOL/L — SIGNIFICANT CHANGE UP (ref 135–145)
SP GR SPEC: 1.01 — SIGNIFICANT CHANGE UP (ref 1.01–1.02)
UROBILINOGEN FLD QL: NEGATIVE — SIGNIFICANT CHANGE UP
WBC # BLD: 8.45 K/UL — SIGNIFICANT CHANGE UP (ref 3.8–10.5)
WBC # FLD AUTO: 8.45 K/UL — SIGNIFICANT CHANGE UP (ref 3.8–10.5)
WBC UR QL: 8 /HPF — HIGH (ref 0–5)

## 2023-09-15 PROCEDURE — 83605 ASSAY OF LACTIC ACID: CPT

## 2023-09-15 PROCEDURE — 71045 X-RAY EXAM CHEST 1 VIEW: CPT

## 2023-09-15 PROCEDURE — 82330 ASSAY OF CALCIUM: CPT

## 2023-09-15 PROCEDURE — 82803 BLOOD GASES ANY COMBINATION: CPT

## 2023-09-15 PROCEDURE — 74177 CT ABD & PELVIS W/CONTRAST: CPT | Mod: MA

## 2023-09-15 PROCEDURE — 85025 COMPLETE CBC W/AUTO DIFF WBC: CPT

## 2023-09-15 PROCEDURE — 87086 URINE CULTURE/COLONY COUNT: CPT

## 2023-09-15 PROCEDURE — 85018 HEMOGLOBIN: CPT

## 2023-09-15 PROCEDURE — 84132 ASSAY OF SERUM POTASSIUM: CPT

## 2023-09-15 PROCEDURE — 74177 CT ABD & PELVIS W/CONTRAST: CPT | Mod: 26,MA

## 2023-09-15 PROCEDURE — 81001 URINALYSIS AUTO W/SCOPE: CPT

## 2023-09-15 PROCEDURE — 83690 ASSAY OF LIPASE: CPT

## 2023-09-15 PROCEDURE — 84702 CHORIONIC GONADOTROPIN TEST: CPT

## 2023-09-15 PROCEDURE — 36415 COLL VENOUS BLD VENIPUNCTURE: CPT

## 2023-09-15 PROCEDURE — 71045 X-RAY EXAM CHEST 1 VIEW: CPT | Mod: 26

## 2023-09-15 PROCEDURE — 85014 HEMATOCRIT: CPT

## 2023-09-15 PROCEDURE — 82947 ASSAY GLUCOSE BLOOD QUANT: CPT

## 2023-09-15 PROCEDURE — 99284 EMERGENCY DEPT VISIT MOD MDM: CPT | Mod: 25

## 2023-09-15 PROCEDURE — 99285 EMERGENCY DEPT VISIT HI MDM: CPT

## 2023-09-15 PROCEDURE — 80053 COMPREHEN METABOLIC PANEL: CPT

## 2023-09-15 PROCEDURE — 82435 ASSAY OF BLOOD CHLORIDE: CPT

## 2023-09-15 PROCEDURE — 84295 ASSAY OF SERUM SODIUM: CPT

## 2023-09-15 RX ORDER — ACETAMINOPHEN 500 MG
975 TABLET ORAL ONCE
Refills: 0 | Status: COMPLETED | OUTPATIENT
Start: 2023-09-15 | End: 2023-09-15

## 2023-09-15 RX ADMIN — Medication 975 MILLIGRAM(S): at 15:19

## 2023-09-15 NOTE — ED PROVIDER NOTE - NS ED ATTENDING STATEMENT MOD
This was a shared visit with the RODO. I reviewed and verified the documentation and independently performed the documented:

## 2023-09-15 NOTE — ED PROVIDER NOTE - PATIENT PORTAL LINK FT
You can access the FollowMyHealth Patient Portal offered by [facility] by registering at the following website: [email or web address]. By joining [employer]s FollowMyHealth portal, you will also be able to view your health information using other applications (apps) compatible with our system.

## 2023-09-15 NOTE — ED PROVIDER NOTE - NSFOLLOWUPINSTRUCTIONS_ED_ALL_ED_FT
-   Please continue with the medications that you are provided, reasons to come back include but are not limited to chest pain shortness of breath, confusion.  Please follow-up with your primary care in 1 week.    - Your testing/exams was/were reassuring that dangerous emergencies/conditions are less likely to be occurring or to have occurred.    - Take all medications, if given/sent to pharmacy, and as, directed.    - If you had labs or imaging done, you were given copies of all labs and/or imaging results from your er visit--please take them with you to your follow up appointments.  - If needed, call patient access services at [phone] to find a primary care physician (PCP). Call this number to follow up with a specialty service, such as the spine clinic. If you need this, call and say you were recently in the emergency department and you are calling, per my orders.   - Make sure you do not require a primary care physician's referral if you make a specialty clinic appointment directly. Some insurance requires you to see your PCP, get a referral, then make a specialty appointment.

## 2023-09-15 NOTE — ED PROVIDER NOTE - PHYSICAL EXAMINATION
CONSTITUTIONAL: Well appearing and in no apparent distress.  ENT: Airway patent, moist mucous membranes.   EYES: Pupils equal, round and reactive to light. EOMI. Conjunctiva normal appearing.   CARDIAC: Normal rate, regular rhythm.  Heart sounds S1, S2.    RESPIRATORY: Breath sounds clear and equal bilaterally.   GASTROINTESTINAL: Abdomen soft, mild lower abdominal tenderness, mildly distended. No CVAT.   PELVIC: Normal external genitalia. Bilateral adnexal TTP. No CMT TTP. Normal appearing vaginal discharge. Chaperoned by nurse Luis Carlos.  MUSCULOSKELETAL: Spine appears normal.  NEUROLOGICAL: Alert and oriented x3, no focal deficits, no motor or sensory deficits. 5/5 muscle strength throughout.  SKIN: Skin normal color, warm, dry and intact.   PSYCHIATRIC: Normal mood and affect.

## 2023-09-15 NOTE — ED PROVIDER NOTE - CLINICAL SUMMARY MEDICAL DECISION MAKING FREE TEXT BOX
Adult female pmh rohini/c-sec/chiari, pw c/o abd pain past 8d seen by gyn ? yeast infection but not responding to tx. Sono reported neg, now w persistent symptoms. Concerns for remote surg complications/?sbo, check ct, pelvic exam and ? gyn cs. Plan basic labs and ct abd pelvis and reassess  Junior Buck MD, Facep

## 2023-09-15 NOTE — ED PROVIDER NOTE - RAPID ASSESSMENT
47 year old female with hx of , chiari malformation, cholecystectomy at the end of  this year, presents to ED complaining of suprapubic abdominal pain and low back pain times approximately 1 week.  Patient states symptoms have been waxing and waning since the end of August but became constant and more severe over the past week.  States pain feels like "period cramps" LMP the end of last month.  No fevers or chills, nausea or vomiting, diarrhea, dysuria or hematuria.  Was seen by her OB/GYN and diagnosed with a yeast infection completed Diflucan.  NAD in triage.    Patient was seen as a QPA patient. The patient will be seen and further worked up in the main emergency department and their care will be completed by the main emergency department team along with a thorough physical exam. Receiving team will follow up on labs, analgesia, any clinical imaging, reassess and disposition as clinically indicated, all decisions regarding the progression of care will be made at their discretion. - Pb OLIVAS

## 2023-09-15 NOTE — ED ADULT TRIAGE NOTE - SOURCE OF INFORMATION
Assessment/Plan:  1  Primary localized osteoarthritis of right knee  XR knee 3 vw right non injury    Large joint arthrocentesis: R knee   2  Encounter for lower extremity comparison imaging study  XR knee 1 or 2 vw left       Scot has right-sided knee pain consistent with mild osteoarthritis over the medial compartment  He may have an element of a degenerative meniscus in that region as well based on his exam however I would recommend treating both situations with conservative measures and ice, anti-inflammatories and a right knee cortisone injection  He agreed to proceed with all 3 of these measures and tolerated the injection well  He could undergo the same treatment on the left knee if it becomes more painful  He will follow up with me as needed regarding his knee pain  Large joint arthrocentesis: R knee  Universal Protocol:  Consent: Verbal consent obtained  Risks and benefits: risks, benefits and alternatives were discussed  Consent given by: patient  Time out: Immediately prior to procedure a "time out" was called to verify the correct patient, procedure, equipment, support staff and site/side marked as required  Site marked: the operative site was marked  Supporting Documentation  Indications: pain   Procedure Details  Location: knee - R knee  Preparation: Patient was prepped and draped in the usual sterile fashion  Needle size: 22 G  Ultrasound guidance: no  Approach: anterolateral  Medications administered: 40 mg dexamethasone 100 mg/10 mL; 4 mL lidocaine 1 %    Patient tolerance: patient tolerated the procedure well with no immediate complications  Dressing:  Sterile dressing applied            Subjective: David Prcie is a 54 y o  male who presents to the office for evaluation for right-sided knee pain  He denies any specific injury or trauma  He has been feeling increased discomfort over the medial aspect of his right knee for the last 6 or 7 weeks    He states the pain seem to increase after 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  The pain has been persistent in aching and throbbing over the medial portion of the knee  He denies any swelling or bruising  He denies any mechanical symptoms locking or catching in the knee  He denies any history of surgery in the knee  He feels a similar slight discomfort on the medial aspect of the left knee but this is getting better  He has not been icing or taking any medications but he has been using a compression sleeve  Review of Systems   Constitutional: Negative for chills, fever and unexpected weight change  HENT: Negative for hearing loss, nosebleeds and sore throat  Eyes: Negative for pain, redness and visual disturbance  Respiratory: Negative for cough, shortness of breath and wheezing  Cardiovascular: Negative for chest pain, palpitations and leg swelling  Gastrointestinal: Negative for abdominal pain, nausea and vomiting  Endocrine: Negative for polyphagia and polyuria  Genitourinary: Negative for dysuria and hematuria  Musculoskeletal:        See HPI   Skin: Negative for rash and wound  Neurological: Negative for dizziness, numbness and headaches  Psychiatric/Behavioral: Negative for decreased concentration and suicidal ideas  The patient is not nervous/anxious            Past Medical History:   Diagnosis Date    Closed fracture of thoracic vertebral body ([address] ) 2014    Erectile dysfunction     Hypertension     Snores     Varicose vein of leg        Past Surgical History:   Procedure Laterality Date    COLONOSCOPY  2017    NO PAST SURGERIES         Family History   Problem Relation Age of Onset    Cancer Mother         ovarian    Ovarian cancer Mother     Ovarian cancer Family     Prostate cancer Family        Social History     Occupational History    Not on file   Tobacco Use    Smoking status: Never Smoker    Smokeless tobacco: Never Used   Vaping Use  Vaping Use: Never used   Substance and Sexual Activity    Alcohol use: Yes     Comment: socially    Drug use: Never    Sexual activity: Yes     Partners: Female         Current Outpatient Medications:     bisacodyl (DULCOLAX) 5 mg EC tablet, Take 10 mg by mouth once, Disp: , Rfl:     cyclobenzaprine (FLEXERIL) 10 mg tablet, Take 1 tablet (10 mg total) by mouth 3 (three) times a day as needed for muscle spasms, Disp: 42 tablet, Rfl: 0    lisinopril (ZESTRIL) 10 mg tablet, Take 1 tablet (10 mg total) by mouth daily at bedtime, Disp: 90 tablet, Rfl: 3    Polyethylene Glycol 3350 (MIRALAX PO), Take by mouth once 238 gm, Disp: , Rfl:     vardenafil (LEVITRA) 20 MG tablet, Take 1 tablet (20 mg total) by mouth daily as needed for erectile dysfunction, Disp: 4 tablet, Rfl: 10    venlafaxine (EFFEXOR-XR) 37 5 mg 24 hr capsule, Take 1 capsule (37 5 mg total) by mouth daily (Patient taking differently: Take 37 5 mg by mouth every morning), Disp: 90 capsule, Rfl: 3    No Known Allergies    Objective:  Vitals:    09/09/22 0843   BP: 123/86   Pulse: 79       Right Knee Exam     Tenderness   The patient is experiencing tenderness in the medial joint line  Range of Motion   Extension: normal   Flexion: normal     Tests   Suhail:  Medial - positive Lateral - negative  Varus: negative Valgus: negative  Lachman:  Anterior - negative      Drawer:  Anterior - negative    Posterior - negative    Other   Erythema: absent  Sensation: normal  Pulse: present  Swelling: none  Effusion: no effusion present    Comments:  Patellar crepitus          Observations     Right Knee   Negative for effusion  Physical Exam  Vitals and nursing note reviewed  Constitutional:       Appearance: He is well-developed  HENT:      Head: Normocephalic and atraumatic  Eyes:      General: No scleral icterus  Extraocular Movements: Extraocular movements intact        Conjunctiva/sclera: Conjunctivae normal    Cardiovascular: Rate and Rhythm: Normal rate  Pulmonary:      Effort: Pulmonary effort is normal  No respiratory distress  Musculoskeletal:      Cervical back: Normal range of motion and neck supple  Right knee: No effusion  Instability Tests: Medial Suhail test positive  Lateral Suhail test negative  Comments: As noted in HPI   Skin:     General: Skin is warm and dry  Neurological:      Mental Status: He is alert and oriented to person, place, and time  Psychiatric:         Behavior: Behavior normal          I have personally reviewed pertinent films in PACS and my interpretation is as follows:  X-rays of the right knee demonstrate mild medial compartment and patellofemoral compartment osteoarthritis  No acute abnormality  No effusion    This document was created using speech voice recognition software  Grammatical errors, random word insertions, pronoun errors, and incomplete sentences are an occasional consequence of this system due to software limitations, ambient noise, and hardware issues  Any formal questions or concerns about content, text, or information contained within the body of this dictation should be directly addressed to the provider for clarification  Patient

## 2023-09-15 NOTE — ED ADULT NURSE NOTE - OBJECTIVE STATEMENT
Pt is a 47y F, AxO3, PMH cholecystectomy in June, presents to ED for abd and back pain x10 days. Pt states pain is and intermittent cramping in the abd and soreness in the lower back. Pt was seen by outpatient OB and treated with antibioticus with no relief. On assessment, breathing spontaneous and unlabored, abd soft and nontender, skin intact. Denies burning on urination, fevers, chills, N/V, diarrhea, SOB, chest pain. Pt is well appearing, speaking full sentences, resting comfortably in bed, no acute distress at this time. Pt is a 47y F, AxO3, PMH cholecystectomy in June, presents to ED for abd and back pain x10 days. Pt states pain is and intermittent cramping in the abd and soreness in the lower back. Pt was seen by outpatient OB and treated with antibiotics with no relief. On assessment, breathing spontaneous and unlabored, abd soft and nontender, skin intact. Denies burning on urination, fevers, chills, N/V, diarrhea, SOB, chest pain. Pt is well appearing, speaking full sentences, resting comfortably in bed, no acute distress at this time.

## 2023-09-15 NOTE — ED PROVIDER NOTE - ATTENDING APP SHARED VISIT CONTRIBUTION OF CARE
Private Physician [facility]  47y f pmh Lap rohini 6/2023, Chiari malformation, C-sec, Pt comes to ed c/o abd pain onset 8d ago. "like bad cramps rad to back" Pt was seen by gyn and had opt sono x2, reported normal. Tx by gyn with diflucan for yeast infection. Now comes to ed w persistent symptoms. Private Physician [facility]  47y f pmh Lap rohini 6/2023, Chiari malformation, C-sec, Pt comes to ed c/o abd pain onset 8d ago. "like bad cramps rad to back" Pt was seen by gyn and had opt sono x2, reported normal. Tx by gyn with diflucan for yeast infection. Now comes to ed w persistent symptoms. No nausea and vomiting, dysuria,fc, cp. PE WDWN female awake alert normocephalic atraumatic neck supple chest clear anterior & posterior cv no rubs, gallops or murmurs abd min suprapubic ttp no rebound guarding masses. neuro no focal defects  Junior Buck MD, Facep

## 2023-09-15 NOTE — ED PROVIDER NOTE - PRO INTERPRETER NEED 2
Patient is due for annual exam, one refill sent to pharmacy. No additional refills will be given until appointment is scheduled.       
English

## 2023-09-15 NOTE — ED PROVIDER NOTE - OBJECTIVE STATEMENT
46 yo F with a PMH of chiari malformation p/w lower abd pain and distension x weeks. Pt reports pain is cramp like, was intermittent but now becoming more constant. Has been seen by GYN for sxs 3 times, has had 2 transvaginal US that she was told were negative. Was tx for BV and now being tx for yeast infection, currently on Difulcan 2/3 doses. Her UA was negative in the office. +Normal BMs. Denies nausea, vomiting, fever, chills, chest pain, diarrhea, hematemesis, dysuria, hematuria, vaginal bleeding/dc/odor, dizziness, weakness. No hx of STIs.  Hx of cholecystectomy.       Per chart review, pt had transvag US on 09/11/23 which revealed  "FINDINGS:  Uterus: 10.0 cm x 3.8 cm x 4.5 cm. Within normal limits.  Endometrium: 7 mm. Within normal limits.  Right ovary: 2.7 cm x 1.7 cm x 2.5 cm. containing a small follicle measuring 14 x 11 x 12 mm.  Left ovary: 2.3 cm x 1.8 cm x 2.4 cm. containing small follicle measuring 7 x 9 mm.  Color flow Doppler both ovaries normal.  Fluid: None.    IMPRESSION:  Normal uterus with normal endometrial lining.    Normal ovaries."

## 2023-09-15 NOTE — ED ADULT NURSE NOTE - NSFALLUNIVINTERV_ED_ALL_ED
Bed/Stretcher in lowest position, wheels locked, appropriate side rails in place/Call bell, personal items and telephone in reach/Instruct patient to call for assistance before getting out of bed/chair/stretcher/Non-slip footwear applied when patient is off stretcher/[place] to call system/Physically safe environment - no spills, clutter or unnecessary equipment/Purposeful proactive rounding/Room/bathroom lighting operational, light cord in reach

## 2023-09-15 NOTE — ED PROVIDER NOTE - PROGRESS NOTE DETAILS
PASTOR Reid PGY-2:  at this time patient reevaluated.  Not tachypneic no increased work of breathing.  Lung bases sound clear bilaterally, no lack of flow.  No crackles.  No wheezing.  Patient feels subjectively better.  We will follow-up with her primary care physician, nebulizers and hand will be taken at home.  Patient is to continue the antibiotics and the medication she was given and the office.

## 2023-09-16 LAB
CULTURE RESULTS: SIGNIFICANT CHANGE UP
SPECIMEN SOURCE: SIGNIFICANT CHANGE UP

## 2023-09-19 ENCOUNTER — APPOINTMENT (OUTPATIENT)
Dept: GASTROENTEROLOGY | Facility: CLINIC | Age: 48
End: 2023-09-19
Payer: MEDICAID

## 2023-09-19 VITALS
BODY MASS INDEX: 25.7 KG/M2 | WEIGHT: 127.5 LBS | OXYGEN SATURATION: 99 % | DIASTOLIC BLOOD PRESSURE: 80 MMHG | HEIGHT: 59 IN | TEMPERATURE: 98 F | RESPIRATION RATE: 14 BRPM | SYSTOLIC BLOOD PRESSURE: 132 MMHG | HEART RATE: 70 BPM

## 2023-09-19 PROCEDURE — 99214 OFFICE O/P EST MOD 30 MIN: CPT

## 2023-09-20 ENCOUNTER — OUTPATIENT (OUTPATIENT)
Dept: OUTPATIENT SERVICES | Facility: HOSPITAL | Age: 48
LOS: 1 days | End: 2023-09-20

## 2023-09-20 ENCOUNTER — APPOINTMENT (OUTPATIENT)
Age: 48
End: 2023-09-20
Payer: MEDICAID

## 2023-09-20 DIAGNOSIS — R10.9 UNSPECIFIED ABDOMINAL PAIN: ICD-10-CM

## 2023-09-20 DIAGNOSIS — Z90.49 ACQUIRED ABSENCE OF OTHER SPECIFIED PARTS OF DIGESTIVE TRACT: Chronic | ICD-10-CM

## 2023-09-20 DIAGNOSIS — R19.5 OTHER FECAL ABNORMALITIES: ICD-10-CM

## 2023-09-20 DIAGNOSIS — I10 ESSENTIAL (PRIMARY) HYPERTENSION: ICD-10-CM

## 2023-09-20 PROCEDURE — 99213 OFFICE O/P EST LOW 20 MIN: CPT | Mod: GC,95

## 2023-09-21 DIAGNOSIS — Z00.00 ENCOUNTER FOR GENERAL ADULT MEDICAL EXAMINATION WITHOUT ABNORMAL FINDINGS: ICD-10-CM

## 2023-09-21 DIAGNOSIS — B37.31 ACUTE CANDIDIASIS OF VULVA AND VAGINA: ICD-10-CM

## 2023-09-21 DIAGNOSIS — R10.2 PELVIC AND PERINEAL PAIN: ICD-10-CM

## 2023-10-03 ENCOUNTER — APPOINTMENT (OUTPATIENT)
Dept: GASTROENTEROLOGY | Facility: CLINIC | Age: 48
End: 2023-10-03
Payer: MEDICAID

## 2023-10-03 DIAGNOSIS — Z86.010 PERSONAL HISTORY OF COLONIC POLYPS: ICD-10-CM

## 2023-10-03 DIAGNOSIS — R10.30 LOWER ABDOMINAL PAIN, UNSPECIFIED: ICD-10-CM

## 2023-10-03 DIAGNOSIS — K58.9 IRRITABLE BOWEL SYNDROME W/OUT DIARRHEA: ICD-10-CM

## 2023-10-03 PROCEDURE — 99214 OFFICE O/P EST MOD 30 MIN: CPT | Mod: 95

## 2023-10-09 PROBLEM — Z86.010 HISTORY OF COLON POLYPS: Status: ACTIVE | Noted: 2023-03-01

## 2023-10-09 PROBLEM — K58.9 IBS (IRRITABLE BOWEL SYNDROME): Status: ACTIVE | Noted: 2023-10-09

## 2023-10-09 PROBLEM — R10.30 INTERMITTENT LOWER ABDOMINAL PAIN: Status: ACTIVE | Noted: 2023-09-19

## 2023-10-18 ENCOUNTER — RESULT REVIEW (OUTPATIENT)
Age: 48
End: 2023-10-18

## 2023-10-18 ENCOUNTER — APPOINTMENT (OUTPATIENT)
Dept: OBGYN | Facility: CLINIC | Age: 48
End: 2023-10-18
Payer: MEDICAID

## 2023-10-18 ENCOUNTER — APPOINTMENT (OUTPATIENT)
Dept: INTERNAL MEDICINE | Facility: CLINIC | Age: 48
End: 2023-10-18

## 2023-10-18 ENCOUNTER — OUTPATIENT (OUTPATIENT)
Dept: OUTPATIENT SERVICES | Facility: HOSPITAL | Age: 48
LOS: 1 days | End: 2023-10-18
Payer: MEDICAID

## 2023-10-18 VITALS — BODY MASS INDEX: 25.45 KG/M2 | SYSTOLIC BLOOD PRESSURE: 124 MMHG | WEIGHT: 126 LBS | DIASTOLIC BLOOD PRESSURE: 82 MMHG

## 2023-10-18 DIAGNOSIS — R92.30 INCONCLUSIVE MAMMOGRAM: ICD-10-CM

## 2023-10-18 DIAGNOSIS — N76.0 ACUTE VAGINITIS: ICD-10-CM

## 2023-10-18 DIAGNOSIS — R92.2 INCONCLUSIVE MAMMOGRAM: ICD-10-CM

## 2023-10-18 DIAGNOSIS — Z90.49 ACQUIRED ABSENCE OF OTHER SPECIFIED PARTS OF DIGESTIVE TRACT: Chronic | ICD-10-CM

## 2023-10-18 PROCEDURE — 99213 OFFICE O/P EST LOW 20 MIN: CPT

## 2023-10-18 RX ORDER — NORETHINDRONE 0.35 MG/1
0.35 TABLET ORAL
Qty: 1 | Refills: 8 | Status: ACTIVE | COMMUNITY
Start: 2023-10-18 | End: 1900-01-01

## 2023-10-30 DIAGNOSIS — N92.4 EXCESSIVE BLEEDING IN THE PREMENOPAUSAL PERIOD: ICD-10-CM

## 2023-10-30 DIAGNOSIS — R92.2 INCONCLUSIVE MAMMOGRAM: ICD-10-CM

## 2023-11-13 ENCOUNTER — LABORATORY RESULT (OUTPATIENT)
Age: 48
End: 2023-11-13

## 2023-11-13 ENCOUNTER — RESULT REVIEW (OUTPATIENT)
Age: 48
End: 2023-11-13

## 2023-11-13 ENCOUNTER — APPOINTMENT (OUTPATIENT)
Dept: OBGYN | Facility: CLINIC | Age: 48
End: 2023-11-13
Payer: MEDICAID

## 2023-11-13 ENCOUNTER — OUTPATIENT (OUTPATIENT)
Dept: OUTPATIENT SERVICES | Facility: HOSPITAL | Age: 48
LOS: 1 days | End: 2023-11-13
Payer: MEDICAID

## 2023-11-13 VITALS — SYSTOLIC BLOOD PRESSURE: 150 MMHG | BODY MASS INDEX: 25.04 KG/M2 | DIASTOLIC BLOOD PRESSURE: 90 MMHG | WEIGHT: 124 LBS

## 2023-11-13 DIAGNOSIS — Z34.80 ENCOUNTER FOR SUPERVISION OF OTHER NORMAL PREGNANCY, UNSPECIFIED TRIMESTER: ICD-10-CM

## 2023-11-13 DIAGNOSIS — Z90.49 ACQUIRED ABSENCE OF OTHER SPECIFIED PARTS OF DIGESTIVE TRACT: Chronic | ICD-10-CM

## 2023-11-13 DIAGNOSIS — Z11.3 ENCOUNTER FOR SCREENING FOR INFECTIONS WITH A PREDOMINANTLY SEXUAL MODE OF TRANSMISSION: ICD-10-CM

## 2023-11-13 DIAGNOSIS — R10.2 PELVIC AND PERINEAL PAIN: ICD-10-CM

## 2023-11-13 DIAGNOSIS — N76.0 ACUTE VAGINITIS: ICD-10-CM

## 2023-11-13 PROCEDURE — 36415 COLL VENOUS BLD VENIPUNCTURE: CPT

## 2023-11-13 PROCEDURE — 87491 CHLMYD TRACH DNA AMP PROBE: CPT

## 2023-11-13 PROCEDURE — 87086 URINE CULTURE/COLONY COUNT: CPT

## 2023-11-13 PROCEDURE — G0463: CPT

## 2023-11-13 PROCEDURE — 87591 N.GONORRHOEAE DNA AMP PROB: CPT

## 2023-11-13 PROCEDURE — 99213 OFFICE O/P EST LOW 20 MIN: CPT

## 2023-11-13 PROCEDURE — 87186 SC STD MICRODIL/AGAR DIL: CPT

## 2023-11-13 PROCEDURE — 87800 DETECT AGNT MULT DNA DIREC: CPT

## 2023-11-13 PROCEDURE — 81002 URINALYSIS NONAUTO W/O SCOPE: CPT

## 2023-11-14 ENCOUNTER — APPOINTMENT (OUTPATIENT)
Dept: ULTRASOUND IMAGING | Facility: IMAGING CENTER | Age: 48
End: 2023-11-14
Payer: MEDICAID

## 2023-11-14 ENCOUNTER — OUTPATIENT (OUTPATIENT)
Dept: OUTPATIENT SERVICES | Facility: HOSPITAL | Age: 48
LOS: 1 days | End: 2023-11-14
Payer: MEDICAID

## 2023-11-14 DIAGNOSIS — Z90.49 ACQUIRED ABSENCE OF OTHER SPECIFIED PARTS OF DIGESTIVE TRACT: Chronic | ICD-10-CM

## 2023-11-14 DIAGNOSIS — R10.2 PELVIC AND PERINEAL PAIN: ICD-10-CM

## 2023-11-14 LAB
C TRACH RRNA SPEC QL NAA+PROBE: SIGNIFICANT CHANGE UP
C TRACH RRNA SPEC QL NAA+PROBE: SIGNIFICANT CHANGE UP
CANDIDA AB TITR SER: SIGNIFICANT CHANGE UP
CANDIDA AB TITR SER: SIGNIFICANT CHANGE UP
G VAGINALIS DNA SPEC QL NAA+PROBE: DETECTED
G VAGINALIS DNA SPEC QL NAA+PROBE: DETECTED
N GONORRHOEA RRNA SPEC QL NAA+PROBE: SIGNIFICANT CHANGE UP
N GONORRHOEA RRNA SPEC QL NAA+PROBE: SIGNIFICANT CHANGE UP
SPECIMEN SOURCE: SIGNIFICANT CHANGE UP
SPECIMEN SOURCE: SIGNIFICANT CHANGE UP
T VAGINALIS SPEC QL WET PREP: SIGNIFICANT CHANGE UP
T VAGINALIS SPEC QL WET PREP: SIGNIFICANT CHANGE UP

## 2023-11-14 PROCEDURE — 76830 TRANSVAGINAL US NON-OB: CPT | Mod: 26

## 2023-11-14 PROCEDURE — 76856 US EXAM PELVIC COMPLETE: CPT

## 2023-11-14 PROCEDURE — 76830 TRANSVAGINAL US NON-OB: CPT

## 2023-11-14 PROCEDURE — 76856 US EXAM PELVIC COMPLETE: CPT | Mod: 26

## 2023-11-15 DIAGNOSIS — N39.0 URINARY TRACT INFECTION, SITE NOT SPECIFIED: ICD-10-CM

## 2023-11-16 ENCOUNTER — RX CHANGE (OUTPATIENT)
Age: 48
End: 2023-11-16

## 2023-11-16 RX ORDER — METRONIDAZOLE 7.5 MG/G
0.75 GEL VAGINAL
Qty: 350 | Refills: 0 | Status: DISCONTINUED | COMMUNITY
Start: 2023-09-11 | End: 2023-11-16

## 2023-12-04 ENCOUNTER — OUTPATIENT (OUTPATIENT)
Dept: OUTPATIENT SERVICES | Facility: HOSPITAL | Age: 48
LOS: 1 days | End: 2023-12-04
Payer: MEDICAID

## 2023-12-04 ENCOUNTER — APPOINTMENT (OUTPATIENT)
Dept: ULTRASOUND IMAGING | Facility: IMAGING CENTER | Age: 48
End: 2023-12-04
Payer: MEDICAID

## 2023-12-04 ENCOUNTER — RESULT REVIEW (OUTPATIENT)
Age: 48
End: 2023-12-04

## 2023-12-04 ENCOUNTER — APPOINTMENT (OUTPATIENT)
Dept: MAMMOGRAPHY | Facility: IMAGING CENTER | Age: 48
End: 2023-12-04
Payer: MEDICAID

## 2023-12-04 DIAGNOSIS — R92.2 INCONCLUSIVE MAMMOGRAM: ICD-10-CM

## 2023-12-04 DIAGNOSIS — Z90.49 ACQUIRED ABSENCE OF OTHER SPECIFIED PARTS OF DIGESTIVE TRACT: Chronic | ICD-10-CM

## 2023-12-04 DIAGNOSIS — Z00.00 ENCOUNTER FOR GENERAL ADULT MEDICAL EXAMINATION WITHOUT ABNORMAL FINDINGS: ICD-10-CM

## 2023-12-04 PROCEDURE — 76641 ULTRASOUND BREAST COMPLETE: CPT | Mod: 26,50

## 2023-12-04 PROCEDURE — 77067 SCR MAMMO BI INCL CAD: CPT | Mod: 26

## 2023-12-04 PROCEDURE — 77067 SCR MAMMO BI INCL CAD: CPT

## 2023-12-04 PROCEDURE — 77063 BREAST TOMOSYNTHESIS BI: CPT | Mod: 26

## 2023-12-04 PROCEDURE — 76641 ULTRASOUND BREAST COMPLETE: CPT

## 2023-12-04 PROCEDURE — 77063 BREAST TOMOSYNTHESIS BI: CPT

## 2024-01-10 ENCOUNTER — APPOINTMENT (OUTPATIENT)
Dept: PAIN MANAGEMENT | Facility: CLINIC | Age: 49
End: 2024-01-10
Payer: MEDICAID

## 2024-01-10 PROCEDURE — 99212 OFFICE O/P EST SF 10 MIN: CPT | Mod: 95

## 2024-01-10 RX ORDER — TIZANIDINE 2 MG/1
2 TABLET ORAL TWICE DAILY
Qty: 60 | Refills: 1 | Status: ACTIVE | COMMUNITY
Start: 2024-01-10 | End: 1900-01-01

## 2024-01-10 RX ORDER — CIPROFLOXACIN HYDROCHLORIDE 500 MG/1
500 TABLET, FILM COATED ORAL
Qty: 14 | Refills: 0 | Status: DISCONTINUED | COMMUNITY
Start: 2023-11-16 | End: 2024-01-10

## 2024-01-10 NOTE — REASON FOR VISIT
[Home] : at home, [unfilled] , at the time of the visit. [Medical Office: ([facility])___] : at the medical office located in  [Patient] : the patient [Self] : self [Follow-Up: _____] : a [unfilled] follow-up visit

## 2024-01-10 NOTE — HISTORY OF PRESENT ILLNESS
[FreeTextEntry1] : 48 year old RH female with Pmhx Chiari I Malformation, chronic Migraines who was referred by Dr. Jovanni Martinez for the management of neck pain and headaches, onset of headaches 2019.  Patient reports increased pain over the last month R>L described as almost constant neck pain, spasm, stiffness almost 7/10 radiates to shoulders and back of head. HA holocephalic R>L burning sensation in neck radiating to occipital region and sides of head.  HA heaviness, eye strain " eye weakness" . She has not had PT in 6-7 months but stretching and using ICE.    Allergy - Macrobid- chest tightness  Prior meds include: Metoclopramide, Magnesium, Amitriptyline, cyclobenzaprine- AE tired, Duloxetine- groggy/sleepy,  Current meds include: gabapentin 300/200/300mg/day, Diclofenac 75 mg bid   Social hx:  She is living with a partner and has 3 children 23/21/14 y/o.  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  She does not smoke or vape, denies etoh or illicit drug use.

## 2024-01-10 NOTE — ASSESSMENT
[FreeTextEntry1] : 47 y/o F with Pmhx Chiari I malformation headaches who presents for initial eval of worsening headaches, neck pain..   - Continue Gabapentin - hold Diclofenac 75 mg bid with meals x 2 weeks and consider restarting.  -trial tizanidine of note, cyclobenzaprine previously caused grogginess. -Discussed local modalities including heat and topical, massage - continue PT

## 2024-01-10 NOTE — PHYSICAL EXAM
[General Appearance - Alert] : alert [General Appearance - In No Acute Distress] : in no acute distress [General Appearance - Well Nourished] : well nourished [General Appearance - Well Developed] : well developed [Oriented To Time, Place, And Person] : oriented to person, place, and time [Impaired Insight] : insight and judgment were intact

## 2024-01-19 ENCOUNTER — APPOINTMENT (OUTPATIENT)
Dept: PAIN MANAGEMENT | Facility: CLINIC | Age: 49
End: 2024-01-19
Payer: MEDICAID

## 2024-01-19 VITALS
HEART RATE: 66 BPM | BODY MASS INDEX: 25 KG/M2 | HEIGHT: 59 IN | WEIGHT: 124 LBS | DIASTOLIC BLOOD PRESSURE: 75 MMHG | SYSTOLIC BLOOD PRESSURE: 121 MMHG

## 2024-01-19 PROCEDURE — 99214 OFFICE O/P EST MOD 30 MIN: CPT

## 2024-01-19 NOTE — HISTORY OF PRESENT ILLNESS
[FreeTextEntry1] : 48 year old RH female with Pmhx Chiari I Malformation, chronic Migraines who was referred by Dr. Jovanni Martinez for the management of neck pain and headaches, onset of headaches 2019.   Patient reports increased pain over the 6 weeks R>L described as almost constant neck pain, spasm, stiffness almost 7/10 radiates to shoulders and back of head. HA holocephalic R>L burning sensation in neck radiating to occipital region and sides of head.  HA heaviness, eye strain " eye weakness" . She has not had PT in 6-7 months but stretching and using ICE.   HA almost daily more severe 4x/week typically throbbing pain 8/10 associated with sensitivity to light and noise, nausea, no vomiting and needs to lay down in a dark room and sleep. Milder GARCIA more regularly 4-5/10 holocephalic.     Allergy - Macrobid- chest tightness  Prior meds include: Metoclopramide, Magnesium, Amitriptyline, cyclobenzaprine- AE tired, Duloxetine- groggy/sleepy,  Current meds include: gabapentin 300/200/300mg/day, Diclofenac 75 mg bid   Social hx:  She is living with a partner and has 3 children 23/21/12 y/o.  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  She does not smoke or vape, denies etoh or illicit drug use.

## 2024-01-19 NOTE — PHYSICAL EXAM
[FreeTextEntry1] : General appearance: Well nourished and with attention to grooming.No signs of distress. No signs of toxicity. Neck/spine: Examination of the cervical spine reveals normal range of motion in the neck, no midline tenderness, +  right cervical paraspinal muscle and trap spasm, tenderness with trigger point. , negative Spurling's bilaterally. Examination of the lumbar spine reveals normal range of motion including flexion, extension and lateral rotation. No midline tenderness, no paraspinal muscle tenderness, negative facet loading,  no tenderness of sciatic notch, no tenderness of bilateral greater trochanters, Negative JAVI, negative SLRT bilaterally.   CV: RRR. Skin: No rash. Musculoskeletal: No joint deformities, no scoliosis, lordosis, kyphosis, pes cavus or hammer toes. Extremities: No peripheral edema. Neurological exam: Mental status: Patient is alert, attentive and oriented X3. Speech is coherent and fluent without dysarthria or aphasia Affect normal. CN: Pupils were 6 mm and reactive to light. Extraocular movements were full. Visual fields are intact to confrontation. No nystagmus. There was no face, jaw, palate or tongue weakness or atrophy. Facial sensation was normal and symmetric. Hearing was grossly intact. Shoulder shrug was normal. Motor exam revealed normal bulk and tone. There is no evidence of atrophy or fasciculations. There is no pronator drift. Manual muscle testing revealed 5/5 strength throughout including neck flexion/extension and proximal and distal muscles of the arms and legs. Sensory exam demonstrated was normal to touch, pin, proprioception, and vibration in arms and legs. Romberg was negative. DTRs: 2+ b/l biceps, 2+ triceps, 2 + brachioradialis, 2+ patellar, and 2+ ankle reflexes. Plantar responses were flexor bilaterally. No clonus, Romero's or crossed adductor response. Coord: Normal finger to nose testing and rapid alternating movements. Gait: Normal gait.

## 2024-01-19 NOTE — ASSESSMENT
[FreeTextEntry1] : 47 y/o F with Pmhx Chiari I malformation, neck pain as well as chronic Migraines . HA are now almost daily, lasting the entire day and impeding on QOL and functioning. She has failed multipe ppx agents ( amitriptyline, Duloxetine, Gabapentin)   - Trial of Topamax advised of AE.  - Continue Gabapentin - hold Diclofenac 75 mg bid with meals x 2 weeks and consider restarting.  -trial tizanidine of note, cyclobenzaprine previously caused grogginess. -Discussed local modalities including heat and topical, massage - continue PT - Consider Botox if above ineffective.

## 2024-02-13 ENCOUNTER — EMERGENCY (EMERGENCY)
Facility: HOSPITAL | Age: 49
LOS: 1 days | Discharge: HOME | End: 2024-02-13
Attending: EMERGENCY MEDICINE
Payer: MEDICAID

## 2024-02-13 VITALS
HEIGHT: 59 IN | OXYGEN SATURATION: 100 % | TEMPERATURE: 98 F | DIASTOLIC BLOOD PRESSURE: 80 MMHG | HEART RATE: 79 BPM | RESPIRATION RATE: 19 BRPM | WEIGHT: 126.1 LBS | SYSTOLIC BLOOD PRESSURE: 142 MMHG

## 2024-02-13 DIAGNOSIS — Z90.49 ACQUIRED ABSENCE OF OTHER SPECIFIED PARTS OF DIGESTIVE TRACT: Chronic | ICD-10-CM

## 2024-02-13 LAB
ALBUMIN SERPL ELPH-MCNC: 3.8 G/DL — SIGNIFICANT CHANGE UP (ref 3.3–5)
ALP SERPL-CCNC: 86 U/L — SIGNIFICANT CHANGE UP (ref 40–120)
ALT FLD-CCNC: 14 U/L — SIGNIFICANT CHANGE UP (ref 10–45)
ANION GAP SERPL CALC-SCNC: 10 MMOL/L — SIGNIFICANT CHANGE UP (ref 5–17)
AST SERPL-CCNC: 31 U/L — SIGNIFICANT CHANGE UP (ref 10–40)
BASOPHILS # BLD AUTO: 0.04 K/UL — SIGNIFICANT CHANGE UP (ref 0–0.2)
BASOPHILS NFR BLD AUTO: 0.5 % — SIGNIFICANT CHANGE UP (ref 0–2)
BILIRUB SERPL-MCNC: 0.2 MG/DL — SIGNIFICANT CHANGE UP (ref 0.2–1.2)
BUN SERPL-MCNC: 14 MG/DL — SIGNIFICANT CHANGE UP (ref 7–23)
CALCIUM SERPL-MCNC: 9.8 MG/DL — SIGNIFICANT CHANGE UP (ref 8.4–10.5)
CHLORIDE SERPL-SCNC: 102 MMOL/L — SIGNIFICANT CHANGE UP (ref 96–108)
CO2 SERPL-SCNC: 22 MMOL/L — SIGNIFICANT CHANGE UP (ref 22–31)
CREAT SERPL-MCNC: 0.89 MG/DL — SIGNIFICANT CHANGE UP (ref 0.5–1.3)
EGFR: 80 ML/MIN/1.73M2 — SIGNIFICANT CHANGE UP
EOSINOPHIL # BLD AUTO: 0.36 K/UL — SIGNIFICANT CHANGE UP (ref 0–0.5)
EOSINOPHIL NFR BLD AUTO: 4.4 % — SIGNIFICANT CHANGE UP (ref 0–6)
FLUAV AG NPH QL: SIGNIFICANT CHANGE UP
FLUBV AG NPH QL: SIGNIFICANT CHANGE UP
GLUCOSE SERPL-MCNC: 95 MG/DL — SIGNIFICANT CHANGE UP (ref 70–99)
HCG SERPL-ACNC: <2 MIU/ML — SIGNIFICANT CHANGE UP
HCT VFR BLD CALC: 37.9 % — SIGNIFICANT CHANGE UP (ref 34.5–45)
HGB BLD-MCNC: 12.4 G/DL — SIGNIFICANT CHANGE UP (ref 11.5–15.5)
IMM GRANULOCYTES NFR BLD AUTO: 0.2 % — SIGNIFICANT CHANGE UP (ref 0–0.9)
LYMPHOCYTES # BLD AUTO: 2.69 K/UL — SIGNIFICANT CHANGE UP (ref 1–3.3)
LYMPHOCYTES # BLD AUTO: 32.6 % — SIGNIFICANT CHANGE UP (ref 13–44)
MCHC RBC-ENTMCNC: 26.8 PG — LOW (ref 27–34)
MCHC RBC-ENTMCNC: 32.7 GM/DL — SIGNIFICANT CHANGE UP (ref 32–36)
MCV RBC AUTO: 81.9 FL — SIGNIFICANT CHANGE UP (ref 80–100)
MONOCYTES # BLD AUTO: 0.56 K/UL — SIGNIFICANT CHANGE UP (ref 0–0.9)
MONOCYTES NFR BLD AUTO: 6.8 % — SIGNIFICANT CHANGE UP (ref 2–14)
NEUTROPHILS # BLD AUTO: 4.58 K/UL — SIGNIFICANT CHANGE UP (ref 1.8–7.4)
NEUTROPHILS NFR BLD AUTO: 55.5 % — SIGNIFICANT CHANGE UP (ref 43–77)
NRBC # BLD: 0 /100 WBCS — SIGNIFICANT CHANGE UP (ref 0–0)
NT-PROBNP SERPL-SCNC: 91 PG/ML — SIGNIFICANT CHANGE UP (ref 0–300)
PLATELET # BLD AUTO: 232 K/UL — SIGNIFICANT CHANGE UP (ref 150–400)
POTASSIUM SERPL-MCNC: 5 MMOL/L — SIGNIFICANT CHANGE UP (ref 3.5–5.3)
POTASSIUM SERPL-SCNC: 5 MMOL/L — SIGNIFICANT CHANGE UP (ref 3.5–5.3)
PROT SERPL-MCNC: 7.7 G/DL — SIGNIFICANT CHANGE UP (ref 6–8.3)
RBC # BLD: 4.63 M/UL — SIGNIFICANT CHANGE UP (ref 3.8–5.2)
RBC # FLD: 13.4 % — SIGNIFICANT CHANGE UP (ref 10.3–14.5)
RSV RNA NPH QL NAA+NON-PROBE: SIGNIFICANT CHANGE UP
SARS-COV-2 RNA SPEC QL NAA+PROBE: SIGNIFICANT CHANGE UP
SODIUM SERPL-SCNC: 134 MMOL/L — LOW (ref 135–145)
TROPONIN T, HIGH SENSITIVITY RESULT: <6 NG/L — SIGNIFICANT CHANGE UP (ref 0–51)
WBC # BLD: 8.25 K/UL — SIGNIFICANT CHANGE UP (ref 3.8–10.5)
WBC # FLD AUTO: 8.25 K/UL — SIGNIFICANT CHANGE UP (ref 3.8–10.5)

## 2024-02-13 PROCEDURE — 99285 EMERGENCY DEPT VISIT HI MDM: CPT | Mod: 25

## 2024-02-13 PROCEDURE — 83880 ASSAY OF NATRIURETIC PEPTIDE: CPT

## 2024-02-13 PROCEDURE — 71046 X-RAY EXAM CHEST 2 VIEWS: CPT | Mod: 26

## 2024-02-13 PROCEDURE — 70498 CT ANGIOGRAPHY NECK: CPT | Mod: MA

## 2024-02-13 PROCEDURE — 85025 COMPLETE CBC W/AUTO DIFF WBC: CPT

## 2024-02-13 PROCEDURE — 71046 X-RAY EXAM CHEST 2 VIEWS: CPT

## 2024-02-13 PROCEDURE — 84484 ASSAY OF TROPONIN QUANT: CPT

## 2024-02-13 PROCEDURE — 99285 EMERGENCY DEPT VISIT HI MDM: CPT

## 2024-02-13 PROCEDURE — 84702 CHORIONIC GONADOTROPIN TEST: CPT

## 2024-02-13 PROCEDURE — 96374 THER/PROPH/DIAG INJ IV PUSH: CPT | Mod: XU

## 2024-02-13 PROCEDURE — 70496 CT ANGIOGRAPHY HEAD: CPT | Mod: 26,MA

## 2024-02-13 PROCEDURE — 70450 CT HEAD/BRAIN W/O DYE: CPT | Mod: MA

## 2024-02-13 PROCEDURE — 70450 CT HEAD/BRAIN W/O DYE: CPT | Mod: 26,MA,59

## 2024-02-13 PROCEDURE — 70496 CT ANGIOGRAPHY HEAD: CPT | Mod: MA

## 2024-02-13 PROCEDURE — 70498 CT ANGIOGRAPHY NECK: CPT | Mod: 26,MA

## 2024-02-13 PROCEDURE — 93005 ELECTROCARDIOGRAM TRACING: CPT

## 2024-02-13 PROCEDURE — 80053 COMPREHEN METABOLIC PANEL: CPT

## 2024-02-13 PROCEDURE — 87637 SARSCOV2&INF A&B&RSV AMP PRB: CPT

## 2024-02-13 RX ORDER — METOCLOPRAMIDE HCL 10 MG
10 TABLET ORAL ONCE
Refills: 0 | Status: COMPLETED | OUTPATIENT
Start: 2024-02-13 | End: 2024-02-13

## 2024-02-13 RX ORDER — SODIUM CHLORIDE 9 MG/ML
1000 INJECTION INTRAMUSCULAR; INTRAVENOUS; SUBCUTANEOUS ONCE
Refills: 0 | Status: COMPLETED | OUTPATIENT
Start: 2024-02-13 | End: 2024-02-13

## 2024-02-13 RX ADMIN — Medication 10 MILLIGRAM(S): at 22:18

## 2024-02-13 RX ADMIN — SODIUM CHLORIDE 1000 MILLILITER(S): 9 INJECTION INTRAMUSCULAR; INTRAVENOUS; SUBCUTANEOUS at 22:19

## 2024-02-13 NOTE — ED PROVIDER NOTE - PATIENT PORTAL LINK FT
You can access the FollowMyHealth Patient Portal offered by [facility] by registering at the following website: [email or web address]. By joining [facility]’s FollowMyHealth portal, you will also be able to view your health information using other applications (apps) compatible with our system.

## 2024-02-13 NOTE — ED ADULT TRIAGE NOTE - WEIGHT IN KG
1. Continue with current inhaler regiment   2. Likely repeat CT chest in 1 year     Return to clinic in 1 year     Call [phone] with questions   
57.2

## 2024-02-13 NOTE — ED PROVIDER NOTE - CHIEF COMPLAINT
[FreeTextEntry1] : This is a 9 month f/u visit for this 73 year old woman with dx of Stage IV endometrial adenocarcinoma diagnosed Nov 2015. \par Excellent hx is available in the chart from GYN ONC and radiation oncology.\par Pertaining to the patient's visit with me, she was referred by Dr. Tracee Ling because of a spontaneous pelvic fracture in March of 2017. She had had previous RT to the area, May-June 2016 to treat the cancer.\par The patient had been on raloxifene but I advised Prolia (gyn onc said it's ok) in Sept 2017 .\par Key med hx:\par 1. prior to the pelvic fx, there is no prior hx of other fractures\par 2. there is no hx of parental hip fx\par 3. menopause approx [age]\par 4. pt states she was on Fosamax for several years, likely shortly after menopause, then she had several years off, and she states she was back on alendronate for several years but has not taken any bisphosphonate for at least past 4-5 years. Of note, she took both alendronate and raloxifene together. She was on  Raloxifene at time of initial visit to me Sept 5 2017.\par 5. as part of management of her endometrial adenocarcinoma the pt was on Arimidex for approx 3-6 months; this was discontinued in the Fall of 2016 when pt was found to have new metastatic pulmonary nodules \par 6. max height: 5' , had been 4'9", today's reading, 4'8"; has lost 3-4 inches and is kyposcoliotic\par 7. of note, pt is now on new medication: Faslodex, an estrogen receptor antagonist for treatment of her metastatic (likely hormonally responsive) cancer (this is an injection IM once a month)\par \par updated hx:\par pt did injure her pelvis Nov 2018, but w/u did not reveal a new fx\par old fx of pelvis, March 2017, is not painful; communication from Dr. 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, but as noted, patient is asymptomatic\par \par today, updated DXA from 12/3/19 was rev and compared with: DXA from 11/2/16:\par T-scores: -2.7, -2.4, -2.5, at the LS, L FN and L TH; \par T-scores: -2.4, -2.4 and -2.4 at the: L2-L4, L FN and L TH\par \par Prolia hx:\par Prolia #1: Sept 2017\par Prolia #2: March 20, 2018\par Prolia #3: Sept 4, 2018\par Prolia #4: May 2019\par \par Today, labs done 11/23/19 and 4/19/19 were rev and compared with: 2/16/19, 8/22/18 and  2/28/18  as well as baseline labs done 9/6/17:\par glucose: 89,  91, prior: 98,  87\par creat: 0.8, 0.89, prior: 0.94,  1.00\par PTH/ca:25/9.5, prior:  23/9.7, prior: 26/9.1  alb: 3.8; 14/9.7  alb: 3.9; 24/9.9\par phos:3.7, prior:  2.1, 3.7\par 25 D: 55.4, 57.3, prior: 84.6, 76.3, 56\par BSAP: 9.2  and 9.5; prior 10, prior: 8.2, 11, 19\par CTX: 239 and 437, prior  288, prior: 217, 239, 329\par \par Labs:: 2/15/17:\par calcium: 9.5  alb:3.3\par creat: 0.87\par \par calcium: 1 pill twice a day; vitamin shoppe\par vitamin D: she stopped the A + D pill; she stopped the separate vitamin D 2000 IU/day;\par \par  The patient is a 48y Female complaining of

## 2024-02-13 NOTE — ED PROVIDER NOTE - PROGRESS NOTE DETAILS
Jeffrey Bolivar MD PGY-2: CT imaging of the head shows no stenosis, no acute abnormalities.  Neurology saw patient and states that paresthesias are likely in the setting of her topiramate.  States that patient does not need to stay for further neurologic workup or imaging.  Can follow-up with your neurologist outpatient within 1 week.

## 2024-02-13 NOTE — ED PROVIDER NOTE - NSICDXPASTMEDICALHX_GEN_ALL_CORE_FT
PAST MEDICAL HISTORY:  History of Chiari malformation     Nonintractable headache, unspecified chronicity pattern, unspecified headache type Chiari malformation- unspecified type

## 2024-02-13 NOTE — ED PROVIDER NOTE - NSFOLLOWUPINSTRUCTIONS_ED_ALL_ED_FT
Continue home medications for now and follow closely outpatient with her Neurologist (Dr. Payne) for continued management. Office located at [address] [place] [address]. [address]. [place] [address].  Patient can call [phone] to schedule this appointment. You were evaluated in the emergency department for leg heaviness and chest pain.  Your results were discussed with you and are attached.  You were seen by our neurology team.  Your symptoms are likely secondary to the topiramate medication you were put on.  Please follow-up with Dr. Payne within 1 week's time.  Information is listed below.    Continue home medications for now and follow closely outpatient with her Neurologist (Dr. Payne) for continued management. Office located at [address] [place] [address]. [address]. [place] [address].  You can call [phone] to schedule this appointment.    Headache    A headache is pain or discomfort felt around the head or neck area. The specific cause of a headache may not be found as there are many types including tension headaches, migraine headaches, and cluster headaches. Watch your condition for any changes. Things you can do to manage your pain include taking over the counter and prescription medications as instructed by your health care provider, lying down in a dark quiet room, limiting stress, getting regular sleep, and refraining from alcohol and tobacco products.    SEEK IMMEDIATE MEDICAL CARE IF YOU HAVE ANY OF THE FOLLOWING SYMPTOMS: fever, vomiting, stiff neck, loss of vision, problems with speech, muscle weakness, loss of balance, trouble walking, passing out, or confusion.

## 2024-02-13 NOTE — ED PROVIDER NOTE - OBJECTIVE STATEMENT
48-year-old female with PMH Chiari malformation follows with neurology presents for right leg heaviness x 4 days.  Patient states that she normally has these burning headaches because of her acute urinary malformation and was started on topiramate on January 21 by her neurologist and was gradually increasing the frequency of the medication.  Patient started taking the medication 1 to 2 weeks ago.  Endorses having upper right thigh leg heaviness that has been worsening over the past 4 days along with right lower leg tingling.  Patient has also had headaches since Saturday intermittent consistent with prior headaches because of her Chiari malformation.  Patient also endorses an episode of left chest heaviness associated with diaphoresis and shortness of breath last night when she was going to bed that self resolved.  Patient developed the same chest pain here in the emergency department.  Also feels like she is lightheaded and about to pass out.  Had an episode of watery diarrhea today as well.  Denies fever, chills, cough, current shortness of breath, abdominal pain, dysuria.    Neurology - Dr. Hay

## 2024-02-13 NOTE — ED PROVIDER NOTE - CLINICAL SUMMARY MEDICAL DECISION MAKING FREE TEXT BOX
48-year-old female with history of Chiari malformation presents for right leg heaviness and tingling and chest pain and headache.  Exam shows no focal neurologic deficits, no dysmetria, normal gait, negative Romberg, cranial nerves II to XII grossly intact.  Will obtain ACS workup, EKG, Trop, chest x-ray likely tomorrow, CT head Noncon with CTA head and neck with IV contrast to evaluate for stenosis.  Dispo pending results.

## 2024-02-13 NOTE — CONSULT NOTE ADULT - SUBJECTIVE AND OBJECTIVE BOX
Neurology - Consult Note    -  Spectra: [phone] ([facility]), [phone] ([facility])  -    HPI: Patient MAGALIE SCHAFFER is a 48y (1975) - handed woman with a PMHx significant for Chiari I Malformation, chronic Migraines (onset of headaches 2019)      Review of Systems:  INCOMPLETE   CONSTITUTIONAL: No fevers or chills  EYES AND ENT: No visual changes or no throat pain   NECK: No pain or stiffness  RESPIRATORY: No hemoptysis or shortness of breath  CARDIOVASCULAR: No chest pain or palpitations  GASTROINTESTINAL: No melena or hematochezia  GENITOURINARY: No dysuria or hematuria  NEUROLOGICAL: +As stated in HPI above  SKIN: No itching, burning, rashes, or lesions   All other review of systems is negative unless indicated above.    Allergies:  Macrobid (Anaphylaxis)      PMHx/PSHx/Family Hx: As above, otherwise see below   No pertinent past medical history    Nonintractable headache, unspecified chronicity pattern, unspecified headache type      Social Hx:  No current use of tobacco, alcohol, or illicit drugs  Lives with *    Medications:  MEDICATIONS  (STANDING):    MEDICATIONS  (PRN):      Vitals:  T(C): 36.7 (02-13-24 @ 19:14), Max: 36.7 (02-13-24 @ 19:14)  HR: 79 (02-13-24 @ 19:14) (79 - 79)  BP: 142/80 (02-13-24 @ 19:14) (142/80 - 142/80)  RR: 19 (02-13-24 @ 19:14) (19 - 19)  SpO2: 100% (02-13-24 @ 19:14) (100% - 100%)    Physical Examination: INCOMPLETE  General - NAD  Cardiovascular - Peripheral pulses palpable, no edema  Eyes - Fundoscopy with flat, sharp optic discs and no hemorrhage or exudates; Fundoscopy not well visualized; Fundoscopy not performed due to safety precautions in the setting of the COVID-19 pandemic    Neurologic Exam:  Mental status - Awake, Alert, Oriented to person, place, and time. Speech fluent, repetition and naming intact. Follows simple and complex commands. Attention/concentration, recent and remote memory (including registration and recall), and fund of knowledge intact    Cranial nerves - PERRLA, VFF, EOMI, face sensation (V1-V3) intact b/l, facial strength intact without asymmetry b/l, hearing intact b/l, palate with symmetric elevation, trapezius OR sternocleidomastiod 5/5 strength b/l, tongue midline on protrusion with full lateral movement    Motor - Normal bulk and tone throughout. No pronator drift.  Strength testing            Deltoid      Biceps      Triceps     Wrist Extension    Wrist Flexion     Interossei         R            5                 5               5                     5                              5                        5                 5  L             5                 5               5                     5                              5                        5                 5              Hip Flexion    Hip Extension    Knee Flexion    Knee Extension    Dorsiflexion    Plantar Flexion  R              5                           5                       5                           5                            5                          5  L              5                           5                        5                           5                            5                          5    Sensation - Light touch/temperature OR pain/vibration intact throughout    DTR's -             Biceps      Triceps     Brachioradialis      Patellar    Ankle    Toes/plantar response  R             2+             2+                  2+                       2+            2+                 Down  L              2+             2+                 2+                        2+           2+                 Down    Coordination - Finger to Nose intact b/l. No tremors appreciated    Gait and station - Normal casual gait. Romberg (-)    Labs:          CAPILLARY BLOOD GLUCOSE              CSF:                  Radiology:       Neurology - Consult Note    -  Spectra: [phone] ([facility]), [phone] ([facility])  -    HPI: Patient MAGALIE SCHAFFER is a 48y (1975) right handed woman with a PMHx significant for Chiari I Malformation, chronic Migraines (onset of headaches 2019) who presents with complaint of right arm and leg tingling, and right leg heaviness. Patient reports she noticed symptoms on Saturday 2/10, gradual onset. She denies any weakness but does endorse heavy sensation of her right thigh. She has no difficulty ambulating. Denies any falls. Endorses intermittent tingling of distal extremities previously. Does not usually have these symptoms with her migraines. Currently does not have a headache now, last migraine was one week ago. Patient follows with Dr Payne/ KAREN Kelly for headache management She is on Gabapentin, tizanidine and recently started topiramate two weeks ago. Increase to 50mg daily in the past week.  Not currently on any OCPs. No history of stroke. Patient recently travelled to [facility], denies any recent illnesses.     LKW: 2/10  NIHSS: 0  pre MRS=0      Review of Systems:    CONSTITUTIONAL: No fevers or chills  EYES AND ENT: No visual changes or no throat pain   NECK: No pain or stiffness  RESPIRATORY: No hemoptysis or shortness of breath  CARDIOVASCULAR: No chest pain or palpitations  GASTROINTESTINAL: No melena or hematochezia  GENITOURINARY: No dysuria or hematuria  NEUROLOGICAL: +As stated in HPI above  SKIN: No itching, burning, rashes, or lesions   All other review of systems is negative unless indicated above.    Allergies:  Macrobid (Anaphylaxis)      PMHx/PSHx/Family Hx: As above, otherwise see below   No pertinent past medical history    Nonintractable headache, unspecified chronicity pattern, unspecified headache type      Social Hx:  No current use of tobacco, alcohol, or illicit drugs    Medications:  MEDICATIONS  (STANDING):    MEDICATIONS  (PRN):      Vitals:  T(C): 36.7 (02-13-24 @ 19:14), Max: 36.7 (02-13-24 @ 19:14)  HR: 79 (02-13-24 @ 19:14) (79 - 79)  BP: 142/80 (02-13-24 @ 19:14) (142/80 - 142/80)  RR: 19 (02-13-24 @ 19:14) (19 - 19)  SpO2: 100% (02-13-24 @ 19:14) (100% - 100%)    Physical Examination:   General - NAD  Cardiovascular - Peripheral pulses palpable, no edema  Eyes -  Fundoscopy not performed due to safety precautions in the setting of the COVID-19 pandemic    Neurologic Exam:  Mental status - Awake, Alert, Oriented to person, place, and time. Speech fluent, repetition and naming intact. Follows simple and complex commands. Attention/concentration, memory and fund of knowledge intact    Cranial nerves - PERRLA, VFF, EOMI, face sensation (V1-V3) intact b/l, facial strength intact without asymmetry b/l, hearing intact b/l, palate with symmetric elevation, trapezius  5/5 strength b/l, tongue midline on protrusion with full lateral movement    Motor - Normal bulk and tone throughout. No pronator drift.  Strength testing            Deltoid      Biceps      Triceps     Wrist Extension    Wrist Flexion     Interossei         R            5                 5               5                     5                              5                        5                 5  L             5                 5               5                     5                              5                        5                 5              Hip Flexion    Hip Extension    Knee Flexion    Knee Extension    Dorsiflexion    Plantar Flexion  R              5                           5                       5                           5                            5                          5  L              5                           5                        5                           5                            5                          5    Sensation - Light touch intact throughout    DTR's -             Biceps      Triceps     Brachioradialis      Patellar    Ankle    Toes/plantar response  R             2+             2+                  2+               1+          1+                 Down  L              2+             2+                 2+                1+           1+                 Down    Coordination - Finger to Nose intact b/l. No tremors appreciated    Gait and station - Normal casual gait.    Labs:      RADIOLOGY, EKG & ADDITIONAL TESTS: Reviewed.     < from: CT Angio Neck w/ IV Cont (02.13.24 @ 21:04) >  FINDINGS:    NONCONTRAST CT HEAD:  Brain parenchyma: Gray-white matter discrimination is well preserved.   There is no mass effect or intracranial hemorrhage. There is inferior   descent of the bilateral cerebellar tonsils 6-7 mm below the level of the   foramen magnum.    Extra-axial compartments: No extra-axial fluid collections are present.   The ventricles and sulci are normal in size and configuration for   patient's stated age. The basal cisterns are patent. Craniocervical   junction and sella turcica are within normal limits.    Calvarium, paranasal sinuses, and orbits: The calvarium is intact.   Paranasal sinuses and mastoid air cells are clear. The orbits are within   normal limits.    CTA BRAIN:  Motion degraded.    Anterior circulation: The petrous, cavernous, and supraclinoid portions   of the internal carotid arteries opacify normally. The bilateral M1   segments are widely patent and the M2/M3 subdivisions follow a normal   course and caliber with symmetric appearance. The bilateral A1 segments   are widely patent and the A2/A3 subdivisions follow a normal course and   caliber with symmetric appearance. The anterior communicating artery is   clearly visualized. Right-sided posterior communicating arteries is   identified. Left-sided posterior communicating artery is not seen.    Posterior circulation: The intracranial portions of the bilateral   vertebral arteries are within normal limits. Bilateral PICA origins arise   normally from the distal V4 segments. The basilar confluence is   unremarkable and the basilar artery follows a normal course and caliber.   AICA origins are identified. The bilateral superior cerebellar arteries   are within normal limits. The bilateral P1 segments are widely patent,   and the P2/P3 subdivisions following normal course and caliber with   symmetric appearance.    To the visualized extent, the dural venous sinuses are patent.   Hypoplastic medial aspect of the left transverse sinus noted.    CTA NECK:  Normal 3 vessel aortic arch is identified.    Anterior circulation: The bilateral common carotid arteries opacify   normally from their origins up to the level of the bifurcations. The   bilateral carotid bulbs are within normal limits. The cervical portions   of the internal carotid arteries opacify normally up to the level of the   skull base.    Posterior circulation: The bilateral vertebral arteries demonstrate   normal anatomic origins from the bilateral subclavian arteries. The   bilateral vertebral arteries opacify normally through their cervical   segments up to the level of the skull base.    LUNG APICES: Motion degraded.    THYROID: Homogeneous in attenuation.    SOFT TISSUES: Within normal limits.    BONES: Unremarkable.      IMPRESSION:    NONCONTRAST CT HEAD:  No evidence of large territory infarct or intracranial hemorrhage.    Redemonstration of inferior descent of the cerebellar tonsils, meeting   criteria for Chiari I malformation. No hydrocephalus.    CTA HEAD/NECK:  Motion degraded exam.    To the visualized extent, no proximal large vessel occlusion, aneurysm,   dissection, or hemodynamically flow-limiting stenosis identified in the   head and neck. Overall grosslystable exam compared to prior MRA from May   2019.    MRI/MRA could be considered if symptoms persist.   Neurology - Consult Note    -  Spectra: [phone] ([facility]), [phone] ([facility])  -    HPI: Patient MAGALIE SCHAFFER is a 48y (1975) right handed woman with a PMHx significant for Chiari I Malformation, chronic Migraines (onset of headaches 2019) who presents with complaint of right arm and leg tingling, and right leg heaviness. Patient reports she noticed symptoms on Saturday 2/10, gradual onset. She denies any weakness but does endorse heavy sensation of her right thigh. She has no difficulty ambulating. Denies any falls. Endorses intermittent tingling of distal extremities previously. Does not usually have these symptoms with her migraines. Currently does not have a headache now, last migraine was one week ago. Patient follows with Dr Payne/ KAREN Kelly for headache management She is on Gabapentin, tizanidine and recently started topiramate two weeks ago. Increase to 50mg daily in the past week.  Not currently on any OCPs. No history of stroke. Patient recently travelled to [employer], denies any recent illnesses.     LKW: 2/10  NIHSS: 0  pre MRS=0      Review of Systems:    CONSTITUTIONAL: No fevers or chills  EYES AND ENT: No visual changes or no throat pain   NECK: No pain or stiffness  RESPIRATORY: No hemoptysis or shortness of breath  CARDIOVASCULAR: No chest pain or palpitations  GASTROINTESTINAL: No melena or hematochezia  GENITOURINARY: No dysuria or hematuria  NEUROLOGICAL: +As stated in HPI above  SKIN: No itching, burning, rashes, or lesions   All other review of systems is negative unless indicated above.    Allergies:  Macrobid (Anaphylaxis)      PMHx/PSHx/Family Hx: As above, otherwise see below   No pertinent past medical history    Nonintractable headache, unspecified chronicity pattern, unspecified headache type      Social Hx:  No current use of tobacco, alcohol, or illicit drugs    Medications:  MEDICATIONS  (STANDING):    MEDICATIONS  (PRN):      Vitals:  T(C): 36.7 (02-13-24 @ 19:14), Max: 36.7 (02-13-24 @ 19:14)  HR: 79 (02-13-24 @ 19:14) (79 - 79)  BP: 142/80 (02-13-24 @ 19:14) (142/80 - 142/80)  RR: 19 (02-13-24 @ 19:14) (19 - 19)  SpO2: 100% (02-13-24 @ 19:14) (100% - 100%)    Physical Examination:   General - NAD  Cardiovascular - Peripheral pulses palpable, no edema  Eyes -  Fundoscopy not performed due to safety precautions in the setting of the COVID-19 pandemic    Neurologic Exam:  Mental status - Awake, Alert, Oriented to person, place, and time. Speech fluent, repetition and naming intact. Follows simple and complex commands. Attention/concentration, memory and fund of knowledge intact    Cranial nerves - PERRLA, VFF, EOMI, face sensation (V1-V3) intact b/l, facial strength intact without asymmetry b/l, hearing intact b/l, palate with symmetric elevation, trapezius  5/5 strength b/l, tongue midline on protrusion with full lateral movement    Motor - Normal bulk and tone throughout. No pronator drift.  Strength testing            Deltoid      Biceps      Triceps     Wrist Extension    Wrist Flexion     Interossei         R            5                 5               5                     5                              5                        5                 5  L             5                 5               5                     5                              5                        5                 5              Hip Flexion    Hip Extension    Knee Flexion    Knee Extension    Dorsiflexion    Plantar Flexion  R              5                           5                       5                           5                            5                          5  L              5                           5                        5                           5                            5                          5    Sensation - Light touch, temperature, pinprick intact throughout    DTR's -             Biceps      Triceps     Brachioradialis      Patellar    Ankle    Toes/plantar response  R             2+             2+                  2+               1+          1+                 Down  L              2+             2+                 2+                1+           1+                 Down    Coordination - Finger to Nose intact b/l. No tremors appreciated    Gait and station - Normal casual gait.    Labs:      RADIOLOGY, EKG & ADDITIONAL TESTS: Reviewed.     < from: CT Angio Neck w/ IV Cont (02.13.24 @ 21:04) >  FINDINGS:    NONCONTRAST CT HEAD:  Brain parenchyma: Gray-white matter discrimination is well preserved.   There is no mass effect or intracranial hemorrhage. There is inferior   descent of the bilateral cerebellar tonsils 6-7 mm below the level of the   foramen magnum.    Extra-axial compartments: No extra-axial fluid collections are present.   The ventricles and sulci are normal in size and configuration for   patient's stated age. The basal cisterns are patent. Craniocervical   junction and sella turcica are within normal limits.    Calvarium, paranasal sinuses, and orbits: The calvarium is intact.   Paranasal sinuses and mastoid air cells are clear. The orbits are within   normal limits.    CTA BRAIN:  Motion degraded.    Anterior circulation: The petrous, cavernous, and supraclinoid portions   of the internal carotid arteries opacify normally. The bilateral M1   segments are widely patent and the M2/M3 subdivisions follow a normal   course and caliber with symmetric appearance. The bilateral A1 segments   are widely patent and the A2/A3 subdivisions follow a normal course and   caliber with symmetric appearance. The anterior communicating artery is   clearly visualized. Right-sided posterior communicating arteries is   identified. Left-sided posterior communicating artery is not seen.    Posterior circulation: The intracranial portions of the bilateral   vertebral arteries are within normal limits. Bilateral PICA origins arise   normally from the distal V4 segments. The basilar confluence is   unremarkable and the basilar artery follows a normal course and caliber.   AICA origins are identified. The bilateral superior cerebellar arteries   are within normal limits. The bilateral P1 segments are widely patent,   and the P2/P3 subdivisions following normal course and caliber with   symmetric appearance.    To the visualized extent, the dural venous sinuses are patent.   Hypoplastic medial aspect of the left transverse sinus noted.    CTA NECK:  Normal 3 vessel aortic arch is identified.    Anterior circulation: The bilateral common carotid arteries opacify   normally from their origins up to the level of the bifurcations. The   bilateral carotid bulbs are within normal limits. The cervical portions   of the internal carotid arteries opacify normally up to the level of the   skull base.    Posterior circulation: The bilateral vertebral arteries demonstrate   normal anatomic origins from the bilateral subclavian arteries. The   bilateral vertebral arteries opacify normally through their cervical   segments up to the level of the skull base.    LUNG APICES: Motion degraded.    THYROID: Homogeneous in attenuation.    SOFT TISSUES: Within normal limits.    BONES: Unremarkable.      IMPRESSION:    NONCONTRAST CT HEAD:  No evidence of large territory infarct or intracranial hemorrhage.    Redemonstration of inferior descent of the cerebellar tonsils, meeting   criteria for Chiari I malformation. No hydrocephalus.    CTA HEAD/NECK:  Motion degraded exam.    To the visualized extent, no proximal large vessel occlusion, aneurysm,   dissection, or hemodynamically flow-limiting stenosis identified in the   head and neck. Overall grosslystable exam compared to prior MRA from May   2019.    MRI/MRA could be considered if symptoms persist.

## 2024-02-13 NOTE — ED PROVIDER NOTE - PHYSICAL EXAMINATION
GENERAL: well appearing in no acute distress, non-toxic appearing  HEAD: normocephalic, atraumatic  HEENT: normal conjunctiva, oral mucosa moist, uvula midline   CARDIAC: regular rate and rhythm, normal S1S2, no appreciable murmurs, 2+ pulses in UE b/l  PULM: normal breath sounds, clear to ascultation bilaterally, no rales, rhonchi, wheezing  GI: abdomen nondistended, soft, nontender, no guarding, rebound tenderness  : no CVA tenderness b/l, no suprapubic tenderness  NEURO: no gross motor or sensory deficits, CN2-12 grossly  intact, no dysmetria, normal speech, PERRL, EOMI, normal gait, AAOx3, negative Romberg  MSK: no peripheral edema   SKIN: well-perfused, extremities warm, no visible rashes  PSYCH: appropriate mood and affect

## 2024-02-13 NOTE — ED PROVIDER NOTE - ATTENDING CONTRIBUTION TO CARE
RGUJRAL 47yo male hx listed presents with R leg heaviness since Saturday. Pt states she felt heaviness intermittent in RLE and RUE. She started topirmate on 1/21 and was not sure if its related to the medication. She spoke to her Neurologist who advised her to come into the hospital. Pt complains of mild diffuse HA. No change in vision, cough, URI, f/c.   On exam, Patient is awake,alert,oriented x 3. Patient is well appearing and in no acute distress. Patient's chest is clear to ausculation, +s1s2. Abdomen is soft nd/nt +BS. Extremity with no swelling or calf tenderness. CN2-12 intact. 5/5 UE/LE nml sensation.  Check labs, CT to eval for CVA, Neuro aware of patient being sent in. IVF and pain control.

## 2024-02-13 NOTE — CONSULT NOTE ADULT - ASSESSMENT
Impression:      Recommendations:              Case to be seen and discussed with Neurology attending, please await attending attestation.  MAGALIE SCHAFFER is a 48y (1975) right handed woman with a PMHx significant for Chiari I Malformation, chronic Migraines (onset of headaches 2019) who presents with complaint of right arm and leg tingling, and right leg heaviness. Patient reports she noticed symptoms on Saturday 2/10, gradual onset. She denies any weakness but does endorse heavy sensation of her right thigh. She has no difficulty ambulating. Denies any falls. Endorses intermittent tingling of distal extremities previously. Does not usually have these symptoms with her migraines. Currently does not have a headache now, last migraine was one week ago. Patient follows with Dr Payne/ KAREN Kelly for headache management She is on Gabapentin, tizanidine and recently started topiramate two weeks ago. Increase to 50mg daily in the past week.  Not currently on any OCPs. No history of stroke. Patient recently travelled to [facility], denies any recent illnesses.     LKW: 2/10  NIHSS: 0  pre MRS=0      Impression:  Right arm and leg paresthesias, now more localized to right thigh likely related adverse effect of Topiramate   Clinically lower suspicion for stroke, patient does not have significant vascular risk factors     Recommendations:    [] No further inpatient neurologic work-up indicated at this time  [] Patient can be discharged home for close follow up with her Neurologist  [] Follow closely outpatient with her Neurologist (Dr. Payne) for continued management. Office located at [address] [place] [address]. [address]. [place] [address].  Patient can call [phone] to schedule this appointment.      Case discussed with Neurology attending Dr. Gilbert, please await attending attestation.   Case also discussed with Stroke fellow Dr. Paul Serrano.    MAGALIE SCHAFFER is a 48y (1975) right handed woman with a PMHx significant for Chiari I Malformation, chronic Migraines (onset of headaches 2019) who presents with complaint of right arm and leg tingling, and right leg heaviness. Patient reports she noticed symptoms on Saturday 2/10, gradual onset. She denies any weakness but does endorse heavy sensation of her right thigh. She has no difficulty ambulating. Denies any falls. Endorses intermittent tingling of distal extremities previously. Does not usually have these symptoms with her migraines. Currently does not have a headache now, last migraine was one week ago. Patient follows with Dr Payne/ KAREN Kelly for headache management She is on Gabapentin, tizanidine and recently started topiramate two weeks ago. Increase to 50mg daily in the past week.  Not currently on any OCPs. No history of stroke. Patient recently travelled to [facility], denies any recent illnesses.     LKW: 2/10  NIHSS: 0  pre MRS=0      Impression:  Right arm and leg paresthesias, now more localized to right thigh likely related adverse effect of Topiramate   Clinically lower suspicion for stroke, patient does not have significant vascular risk factors     Recommendations:    [] No further inpatient neurologic work-up indicated at this time  [] Patient can be discharged home for close follow up with her Neurologist  [] Continue home medications for now and follow closely outpatient with her Neurologist (Dr. Payne) for continued management. Office located at [address] [place] [address]. [address]. [place] [address].  Patient can call [phone] to schedule this appointment.      Case discussed with Neurology attending Dr. Gilbert, please await attending attestation.   Case also discussed with Stroke fellow Dr. Paul Serrano.

## 2024-02-14 VITALS
TEMPERATURE: 98 F | SYSTOLIC BLOOD PRESSURE: 116 MMHG | DIASTOLIC BLOOD PRESSURE: 69 MMHG | OXYGEN SATURATION: 97 % | RESPIRATION RATE: 16 BRPM | HEART RATE: 65 BPM

## 2024-02-14 NOTE — ED ADULT NURSE NOTE - NSFALLUNIVINTERV_ED_ALL_ED
Bed/Stretcher in lowest position, wheels locked, appropriate side rails in place/Call bell, personal items and telephone in reach/Instruct patient to call for assistance before getting out of bed/chair/stretcher/Non-slip footwear applied when patient is off stretcher/[place] to call system/Physically safe environment - no spills, clutter or unnecessary equipment/Purposeful proactive rounding/Room/bathroom lighting operational, light cord in reach

## 2024-02-14 NOTE — ED ADULT NURSE NOTE - OBJECTIVE STATEMENT
47 y/o female presents to the ED stating her right leg feels heavy for 3 days. PMHx Chiari malformation. Pt denies any other pertinent medical history. Allergy to Macrobid. On assessment no swelling or redness present. Lower extremity pulses strong, sensory and motor functions intact. Pt AxOx4. Airway is patent. Breathing is spontaneous and unlabored. Pt skin is warm and dry, color appropriate for ethnicity. Stretcher locked and in lowest position, appropriate side rails up. Pt instructed to notify RN if assistance is needed.

## 2024-02-21 ENCOUNTER — APPOINTMENT (OUTPATIENT)
Dept: PAIN MANAGEMENT | Facility: CLINIC | Age: 49
End: 2024-02-21
Payer: MEDICAID

## 2024-02-21 VITALS
DIASTOLIC BLOOD PRESSURE: 80 MMHG | BODY MASS INDEX: 25 KG/M2 | HEIGHT: 59 IN | WEIGHT: 124 LBS | SYSTOLIC BLOOD PRESSURE: 133 MMHG | HEART RATE: 69 BPM

## 2024-02-21 PROBLEM — Z86.69 PERSONAL HISTORY OF OTHER DISEASES OF THE NERVOUS SYSTEM AND SENSE ORGANS: Chronic | Status: ACTIVE | Noted: 2024-02-13

## 2024-02-21 PROCEDURE — 99213 OFFICE O/P EST LOW 20 MIN: CPT

## 2024-02-21 RX ORDER — TOPIRAMATE 25 MG/1
25 TABLET, FILM COATED ORAL
Qty: 90 | Refills: 2 | Status: DISCONTINUED | COMMUNITY
Start: 2024-01-22 | End: 2024-02-21

## 2024-02-21 NOTE — ASSESSMENT
[FreeTextEntry1] : 49 y/o F with Pmhx Chiari I malformation, neck pain as well as chronic Migraines . HA are now almost daily, lasting the entire day and impeding on QOL and functioning. She has failed multipe ppx agents ( amitriptyline, Duloxetine, Gabapentin, Topamax)   - Increase Gabapentin to 200mg 3x/day and gradually increase hs dose.  - hold Diclofenac 75 mg bid with meals prn  -continue tizanidine with caution of note, cyclobenzaprine previously caused grogginess. -Rizatriptan prn as abortive  -Discussed local modalities including heat and topical, massage - restart  PT - Consider Botox if above ineffective.

## 2024-02-21 NOTE — HISTORY OF PRESENT ILLNESS
[FreeTextEntry1] : 48 year old RH female with Pmhx Chiari I Malformation, chronic Migraines who was referred by Dr. Jovanni Martinez for the management of neck pain and headaches, onset of headaches .  Patient reports she started Topamax 25mg qhs and increased to 50mg in 1 week. She then developed right UE/LE paresthesia and " heaviness" was seen in ER and r/o CVA.  HA improved somewhat while she was in [facility] for .  She had 1 severe HA in the last 5/10 R>L burning sensation in neck radiating to occipital region and sides of head. HA are now almost daily, lasting the entire day and impeding on QOL and functioning. She has failed multiple ppx agents ( amitriptyline, Duloxetine, Gabapentin) Neck pain also improved now more right sided described as a burning sensation in neck radiating to occipital region and sides of head.  HA heaviness, eye strain " eye weakness" .  She has seen Optho a few months ago and has new rx for glasses. Decreased gabapentin to 200 mg 2x/day    Allergy - Macrobid- chest tightness  Prior meds include: Metoclopramide, Magnesium, Amitriptyline, cyclobenzaprine- AE tired, Duloxetine- groggy/sleepy,  Current meds include: gabapentin 300/200/300mg/day, Diclofenac 75 mg bid, tizanidine   Social hx:  She is living with a partner and has 3 children  y/o.  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  She does not smoke or vape, denies etoh or illicit drug use.

## 2024-03-19 ENCOUNTER — APPOINTMENT (OUTPATIENT)
Dept: PAIN MANAGEMENT | Facility: CLINIC | Age: 49
End: 2024-03-19

## 2024-03-27 NOTE — DISCHARGE NOTE PROVIDER - DISCHARGE SERVICE FOR PATIENT
Quality 431: Preventive Care And Screening: Unhealthy Alcohol Use - Screening: Patient not identified as an unhealthy alcohol user when screened for unhealthy alcohol use using a systematic screening method Quality 130: Documentation Of Current Medications In The Medical Record: Current Medications Documented Detail Level: Detailed Quality 226: Preventive Care And Screening: Tobacco Use: Screening And Cessation Intervention: Patient screened for tobacco use and is an ex/non-smoker on the discharge service for the patient. I have reviewed and made amendments to the documentation where necessary.

## 2024-03-29 ENCOUNTER — APPOINTMENT (OUTPATIENT)
Dept: PAIN MANAGEMENT | Facility: CLINIC | Age: 49
End: 2024-03-29

## 2024-04-17 ENCOUNTER — LABORATORY RESULT (OUTPATIENT)
Age: 49
End: 2024-04-17

## 2024-04-18 ENCOUNTER — OUTPATIENT (OUTPATIENT)
Dept: OUTPATIENT SERVICES | Facility: HOSPITAL | Age: 49
LOS: 1 days | End: 2024-04-18
Payer: COMMERCIAL

## 2024-04-18 ENCOUNTER — APPOINTMENT (OUTPATIENT)
Dept: OBGYN | Facility: CLINIC | Age: 49
End: 2024-04-18
Payer: COMMERCIAL

## 2024-04-18 VITALS — WEIGHT: 126 LBS | BODY MASS INDEX: 25.45 KG/M2

## 2024-04-18 DIAGNOSIS — Z90.49 ACQUIRED ABSENCE OF OTHER SPECIFIED PARTS OF DIGESTIVE TRACT: Chronic | ICD-10-CM

## 2024-04-18 DIAGNOSIS — N76.0 ACUTE VAGINITIS: ICD-10-CM

## 2024-04-18 DIAGNOSIS — Z11.3 ENCOUNTER FOR SCREENING FOR INFECTIONS WITH A PREDOMINANTLY SEXUAL MODE OF TRANSMISSION: ICD-10-CM

## 2024-04-18 PROCEDURE — 87624 HPV HI-RISK TYP POOLED RSLT: CPT

## 2024-04-18 PROCEDURE — 87591 N.GONORRHOEAE DNA AMP PROB: CPT

## 2024-04-18 PROCEDURE — 87491 CHLMYD TRACH DNA AMP PROBE: CPT

## 2024-04-18 PROCEDURE — G0463: CPT

## 2024-04-18 PROCEDURE — 99212 OFFICE O/P EST SF 10 MIN: CPT | Mod: GC

## 2024-04-18 NOTE — PHYSICAL EXAM
[Appropriately responsive] : appropriately responsive [Oriented x3] : oriented x3 [Labia Majora] : normal [Labia Minora] : normal [Discharge] : a  ~M vaginal discharge was present [Heavy] : heavy [White] : white [Thick] : thick [Normal] : normal [Anteversion] : anteverted [Uterine Adnexae] : non-palpable

## 2024-04-22 LAB — CYTOLOGY SPEC DOC CYTO: SIGNIFICANT CHANGE UP

## 2024-04-23 ENCOUNTER — APPOINTMENT (OUTPATIENT)
Dept: INTERNAL MEDICINE | Facility: CLINIC | Age: 49
End: 2024-04-23

## 2024-04-23 ENCOUNTER — OUTPATIENT (OUTPATIENT)
Dept: OUTPATIENT SERVICES | Facility: HOSPITAL | Age: 49
LOS: 1 days | End: 2024-04-23

## 2024-04-23 DIAGNOSIS — I10 ESSENTIAL (PRIMARY) HYPERTENSION: ICD-10-CM

## 2024-04-23 DIAGNOSIS — Z90.49 ACQUIRED ABSENCE OF OTHER SPECIFIED PARTS OF DIGESTIVE TRACT: Chronic | ICD-10-CM

## 2024-04-23 PROCEDURE — XXXXX: CPT | Mod: 1L

## 2024-04-29 DIAGNOSIS — Z11.3 ENCOUNTER FOR SCREENING FOR INFECTIONS WITH A PREDOMINANTLY SEXUAL MODE OF TRANSMISSION: ICD-10-CM

## 2024-04-29 DIAGNOSIS — B37.31 ACUTE CANDIDIASIS OF VULVA AND VAGINA: ICD-10-CM

## 2024-05-08 ENCOUNTER — OUTPATIENT (OUTPATIENT)
Dept: OUTPATIENT SERVICES | Facility: HOSPITAL | Age: 49
LOS: 1 days | End: 2024-05-08
Payer: COMMERCIAL

## 2024-05-08 ENCOUNTER — APPOINTMENT (OUTPATIENT)
Dept: INTERNAL MEDICINE | Facility: CLINIC | Age: 49
End: 2024-05-08
Payer: COMMERCIAL

## 2024-05-08 ENCOUNTER — NON-APPOINTMENT (OUTPATIENT)
Age: 49
End: 2024-05-08

## 2024-05-08 VITALS
WEIGHT: 125 LBS | DIASTOLIC BLOOD PRESSURE: 75 MMHG | HEART RATE: 65 BPM | SYSTOLIC BLOOD PRESSURE: 110 MMHG | BODY MASS INDEX: 25.2 KG/M2 | OXYGEN SATURATION: 99 % | HEIGHT: 59 IN

## 2024-05-08 DIAGNOSIS — J06.9 ACUTE UPPER RESPIRATORY INFECTION, UNSPECIFIED: ICD-10-CM

## 2024-05-08 DIAGNOSIS — Z86.19 PERSONAL HISTORY OF OTHER INFECTIOUS AND PARASITIC DISEASES: ICD-10-CM

## 2024-05-08 DIAGNOSIS — I10 ESSENTIAL (PRIMARY) HYPERTENSION: ICD-10-CM

## 2024-05-08 DIAGNOSIS — G93.5 COMPRESSION OF BRAIN: ICD-10-CM

## 2024-05-08 DIAGNOSIS — F07.81 POSTCONCUSSIONAL SYNDROME: ICD-10-CM

## 2024-05-08 DIAGNOSIS — R39.9 UNSPECIFIED SYMPTOMS AND SIGNS INVOLVING THE GENITOURINARY SYSTEM: ICD-10-CM

## 2024-05-08 DIAGNOSIS — Z90.49 ACQUIRED ABSENCE OF OTHER SPECIFIED PARTS OF DIGESTIVE TRACT: Chronic | ICD-10-CM

## 2024-05-08 DIAGNOSIS — Z00.00 ENCOUNTER FOR GENERAL ADULT MEDICAL EXAMINATION W/OUT ABNORMAL FINDINGS: ICD-10-CM

## 2024-05-08 PROCEDURE — 80053 COMPREHEN METABOLIC PANEL: CPT

## 2024-05-08 PROCEDURE — G0463: CPT

## 2024-05-08 PROCEDURE — 83036 HEMOGLOBIN GLYCOSYLATED A1C: CPT

## 2024-05-08 PROCEDURE — 80061 LIPID PANEL: CPT

## 2024-05-08 PROCEDURE — 99213 OFFICE O/P EST LOW 20 MIN: CPT | Mod: GE

## 2024-05-08 PROCEDURE — 85027 COMPLETE CBC AUTOMATED: CPT

## 2024-05-08 RX ORDER — FLUCONAZOLE 150 MG/1
150 TABLET ORAL
Qty: 3 | Refills: 2 | Status: DISCONTINUED | COMMUNITY
Start: 2023-09-12 | End: 2024-05-08

## 2024-05-08 RX ORDER — CLOBETASOL PROPIONATE 0.5 MG/G
0.05 CREAM TOPICAL
Qty: 1 | Refills: 4 | Status: DISCONTINUED | COMMUNITY
Start: 2022-11-30 | End: 2024-05-08

## 2024-05-08 RX ORDER — FLUCONAZOLE 150 MG/1
150 TABLET ORAL
Qty: 2 | Refills: 0 | Status: DISCONTINUED | COMMUNITY
Start: 2023-08-21 | End: 2024-05-08

## 2024-05-08 RX ORDER — CIPROFLOXACIN HYDROCHLORIDE 500 MG/1
500 TABLET, FILM COATED ORAL
Qty: 14 | Refills: 0 | Status: DISCONTINUED | COMMUNITY
Start: 2023-11-15 | End: 2024-05-08

## 2024-05-08 RX ORDER — DIAPER,BRIEF,INFANT-TODD,DISP
EACH MISCELLANEOUS DAILY
Qty: 30 | Refills: 0 | Status: DISCONTINUED | COMMUNITY
Start: 2021-11-05 | End: 2024-05-08

## 2024-05-08 RX ORDER — FLUTICASONE FUROATE 27.5 UG/1
27.5 SPRAY, METERED NASAL DAILY
Qty: 1 | Refills: 0 | Status: DISCONTINUED | COMMUNITY
Start: 2023-02-21 | End: 2024-05-08

## 2024-05-08 RX ORDER — RIZATRIPTAN BENZOATE 10 MG/1
10 TABLET ORAL
Qty: 12 | Refills: 5 | Status: DISCONTINUED | COMMUNITY
Start: 2024-02-21 | End: 2024-05-08

## 2024-05-08 RX ORDER — METRONIDAZOLE 7.5 MG/G
0.75 GEL VAGINAL
Qty: 0 | Refills: 0 | Status: DISCONTINUED | COMMUNITY
Start: 2023-11-16 | End: 2024-05-08

## 2024-05-08 RX ORDER — AZITHROMYCIN 500 MG/1
500 TABLET, FILM COATED ORAL
Qty: 3 | Refills: 0 | Status: DISCONTINUED | COMMUNITY
Start: 2022-12-08 | End: 2024-05-08

## 2024-05-08 RX ORDER — FLUCONAZOLE 150 MG/1
150 TABLET ORAL
Qty: 1 | Refills: 0 | Status: DISCONTINUED | COMMUNITY
Start: 2024-04-18 | End: 2024-05-08

## 2024-05-08 RX ORDER — DICLOFENAC SODIUM 75 MG/1
75 TABLET, DELAYED RELEASE ORAL
Qty: 60 | Refills: 1 | Status: DISCONTINUED | COMMUNITY
Start: 2023-05-11 | End: 2024-05-08

## 2024-05-08 RX ORDER — TERCONAZOLE 8 MG/G
0.8 CREAM VAGINAL DAILY
Qty: 1 | Refills: 0 | Status: DISCONTINUED | COMMUNITY
Start: 2023-08-21 | End: 2024-05-08

## 2024-05-08 RX ORDER — SODIUM SULFATE, POTASSIUM SULFATE AND MAGNESIUM SULFATE 1.6; 3.13; 17.5 G/177ML; G/177ML; G/177ML
17.5-3.13-1.6 SOLUTION ORAL TWICE DAILY
Qty: 2 | Refills: 0 | Status: DISCONTINUED | COMMUNITY
Start: 2023-01-09 | End: 2024-05-08

## 2024-05-08 RX ORDER — BENZONATATE 100 MG/1
100 CAPSULE ORAL 3 TIMES DAILY
Qty: 21 | Refills: 0 | Status: DISCONTINUED | COMMUNITY
Start: 2023-08-28 | End: 2024-05-08

## 2024-05-08 RX ORDER — TRIAMCINOLONE ACETONIDE 1 MG/G
0.1 OINTMENT TOPICAL
Qty: 80 | Refills: 0 | Status: DISCONTINUED | COMMUNITY
Start: 2022-06-06 | End: 2024-05-08

## 2024-05-08 RX ORDER — PREDNISONE 10 MG/1
10 TABLET ORAL
Qty: 45 | Refills: 0 | Status: DISCONTINUED | COMMUNITY
Start: 2023-04-27 | End: 2024-05-08

## 2024-05-08 RX ORDER — DICYCLOMINE HYDROCHLORIDE 20 MG/1
20 TABLET ORAL
Qty: 90 | Refills: 3 | Status: DISCONTINUED | COMMUNITY
Start: 2023-09-19 | End: 2024-05-08

## 2024-05-10 LAB
ALBUMIN SERPL ELPH-MCNC: 4.4 G/DL
ALP BLD-CCNC: 92 U/L
ALT SERPL-CCNC: 7 U/L
ANION GAP SERPL CALC-SCNC: 10 MMOL/L
AST SERPL-CCNC: 14 U/L
BILIRUB SERPL-MCNC: 0.3 MG/DL
BUN SERPL-MCNC: 12 MG/DL
CALCIUM SERPL-MCNC: 9.9 MG/DL
CHLORIDE SERPL-SCNC: 102 MMOL/L
CHOLEST SERPL-MCNC: 213 MG/DL
CO2 SERPL-SCNC: 28 MMOL/L
CREAT SERPL-MCNC: 0.99 MG/DL
EGFR: 70 ML/MIN/1.73M2
ESTIMATED AVERAGE GLUCOSE: 108 MG/DL
GLUCOSE SERPL-MCNC: 76 MG/DL
HBA1C MFR BLD HPLC: 5.4 %
HCT VFR BLD CALC: 41.4 %
HDLC SERPL-MCNC: 57 MG/DL
HGB BLD-MCNC: 13.2 G/DL
LDLC SERPL CALC-MCNC: 140 MG/DL
MCHC RBC-ENTMCNC: 26.4 PG
MCHC RBC-ENTMCNC: 31.9 GM/DL
MCV RBC AUTO: 82.8 FL
NONHDLC SERPL-MCNC: 156 MG/DL
PLATELET # BLD AUTO: 234 K/UL
POTASSIUM SERPL-SCNC: 4.2 MMOL/L
PROT SERPL-MCNC: 8 G/DL
RBC # BLD: 5 M/UL
RBC # FLD: 13.7 %
SODIUM SERPL-SCNC: 139 MMOL/L
TRIGL SERPL-MCNC: 92 MG/DL
WBC # FLD AUTO: 6.03 K/UL

## 2024-05-12 PROBLEM — Z86.19 HISTORY OF CANDIDIASIS OF VAGINA: Status: RESOLVED | Noted: 2024-04-18 | Resolved: 2024-05-12

## 2024-05-12 PROBLEM — G93.5 CHIARI I MALFORMATION: Status: ACTIVE | Noted: 2019-05-07

## 2024-05-12 PROBLEM — J06.9 URI, ACUTE: Status: RESOLVED | Noted: 2019-02-25 | Resolved: 2024-05-12

## 2024-05-12 PROBLEM — F07.81 POSTCONCUSSIVE SYNDROME: Status: RESOLVED | Noted: 2021-06-09 | Resolved: 2024-05-12

## 2024-05-12 PROBLEM — R39.9 UTI SYMPTOMS: Status: RESOLVED | Noted: 2022-06-22 | Resolved: 2024-05-12

## 2024-05-12 NOTE — HEALTH RISK ASSESSMENT
[Very Good] : ~his/her~  mood as very good [No] : No [1 or 2 (0 pts)] : 1 or 2 (0 points) [Never (0 pts)] : Never (0 points) [No falls in past year] : Patient reported no falls in the past year [Little interest or pleasure doing things] : 1) Little interest or pleasure doing things [Feeling down, depressed, or hopeless] : 2) Feeling down, depressed, or hopeless [0] : 2) Feeling down, depressed, or hopeless: Not at all (0) [PHQ-2 Negative - No further assessment needed] : PHQ-2 Negative - No further assessment needed [WHG9Toxbl] : 0

## 2024-05-12 NOTE — HISTORY OF PRESENT ILLNESS
[de-identified] : 48 year old female with hx of chiari I malformation, IBS, and chronic migraines presenting for a CPE. She has no acute complaints at this time. She mentions that her headaches have been improving and that she has appropriate coping mechanisms. At this time, she is not concerned regarding her headaches and mentions that she has a follow-up with neurology soon. She has a lot of stress at home but she attributes it to her son's wedding which is fast approaching. Denies vision changes, photophobia, n/v/c/d or shortness of breath.

## 2024-05-12 NOTE — ASSESSMENT
[FreeTextEntry1] : 48 year old female with hx of chiari I malformation, IBS, and chronic migraines presenting for a CPE.  #HCM  - optho/dental referral  - routine labs  - up to date on colonoscopy  - pap and mammo up to date   Case discussed with Dr. Correa. RTC in 1 year. Post-encounter form provided.

## 2024-05-14 NOTE — PLAN
[FreeTextEntry1] : 48y P3 presenting for vaginal discharge.  #Yeast infection - Pt preferring PO medication over vaginal cream for Tx - Fluconazole sent to pharmacy  #Health maintenance - Pap collected today - GC/CT collected - Rx OCP refill sent to pharmacy  D/w Dr. Enio Brown PGY1

## 2024-05-14 NOTE — HISTORY OF PRESENT ILLNESS
[FreeTextEntry1] : 48y  LMP/  presenting for cramping and vaginal discharge. She reports thick white discharge that began before her period and is now white/brown-noman. Discharge does not have an odor and is not irritating/itchy. She reports that discharge is associated with cramping and back pain. She is sexually active, last had intercourse at the beginning of March and did not use contraception.  HCM: - Pap 2019 NILM/HPV neg - Mammo 2023 birads2, repeat in one year - Colonoscopy: reports up to date and nl - BC: on OCPs

## 2024-05-14 NOTE — REVIEW OF SYSTEMS
[Abdominal Pain] : abdominal pain [Pelvic pain] : pelvic pain [Back Pain] : back pain [Negative] : Heme/Lymph [Urgency] : no urgency [Frequency] : no frequency [Incontinence] : no incontinence [Dysuria] : no dysuria [Urethral Discharge] : no urethral discharge [Abn Vaginal bleeding] : no abnormal vaginal bleeding [Genital Rash/Irritation] : no genital rash/irritation

## 2024-05-15 ENCOUNTER — APPOINTMENT (OUTPATIENT)
Dept: PAIN MANAGEMENT | Facility: CLINIC | Age: 49
End: 2024-05-15
Payer: COMMERCIAL

## 2024-05-15 VITALS
DIASTOLIC BLOOD PRESSURE: 78 MMHG | HEIGHT: 59 IN | SYSTOLIC BLOOD PRESSURE: 125 MMHG | WEIGHT: 122 LBS | BODY MASS INDEX: 24.6 KG/M2 | HEART RATE: 62 BPM

## 2024-05-15 PROCEDURE — 99213 OFFICE O/P EST LOW 20 MIN: CPT

## 2024-05-15 NOTE — PHYSICAL EXAM
[FreeTextEntry1] : General appearance: Well nourished and with attention to grooming.No signs of distress. No signs of toxicity. Neck/spine: Examination of the cervical spine reveals normal range of motion in the neck, no midline tenderness, +  right cervical paraspinal muscle and trap spasm, tenderness with trigger point. , negative Spurling's bilaterally. Examination of the lumbar spine reveals normal range of motion including flexion, extension and lateral rotation. No midline tenderness, no paraspinal muscle tenderness, negative facet loading,  no tenderness of sciatic notch, no tenderness of bilateral greater trochanters, Negative AJVI, negative SLRT bilaterally.   CV: RRR. Skin: No rash. Musculoskeletal: No joint deformities, no scoliosis, lordosis, kyphosis, pes cavus or hammer toes. Extremities: No peripheral edema. Neurological exam: Mental status: Patient is alert, attentive and oriented X3. Speech is coherent and fluent without dysarthria or aphasia Affect normal. CN: Pupils were 6 mm and reactive to light. Extraocular movements were full. Visual fields are intact to confrontation. No nystagmus. There was no face, jaw, palate or tongue weakness or atrophy. Facial sensation was normal and symmetric. Hearing was grossly intact. Shoulder shrug was normal. Motor exam revealed normal bulk and tone. There is no evidence of atrophy or fasciculations. There is no pronator drift. Manual muscle testing revealed 5/5 strength throughout including neck flexion/extension and proximal and distal muscles of the arms and legs. Sensory exam demonstrated was normal to touch, pin, proprioception, and vibration in arms and legs. Romberg was negative. DTRs: 2+ b/l biceps, 2+ triceps, 2 + brachioradialis, 2+ patellar, and 2+ ankle reflexes. Plantar responses were flexor bilaterally. No clonus, Romero's or crossed adductor response. Coord: Normal finger to nose testing and rapid alternating movements. Gait: Normal gait.

## 2024-05-15 NOTE — HISTORY OF PRESENT ILLNESS
[FreeTextEntry1] : 48 year old RH female with Pmhx Chiari I Malformation, chronic Migraines who was referred by Dr. Jovanni Martinez for the management of neck pain and headaches, onset of headaches 2019.  She is feeling much better overall.  She has been having Migraines 2-3x.month on average for which she is responding well to Rizatriptan.  Intermittent neck tension/tightness but overall improved.  She woke up this AM with neck pain and spasm R>L.  She stopped PT and she feels it was making her feels worse. She is doing HEP and stretching.     Allergy - Macrobid- chest tightness  Prior meds include: Metoclopramide, Magnesium, Amitriptyline, Duloxetine- groggy/sleepy, Topamax, cyclobenzaprine- AE tired Current meds include: gabapentin 200mg 2x/day, Diclofenac 75 mg bid, tizanidine, Rizatriptan prn.   Social hx:  She is living with a partner and has 3 children 23/21/14 y/o. Her oldest son is getting  June 15th and fiance is expecting.  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  She does not smoke or vape, denies etoh or illicit drug use.

## 2024-05-15 NOTE — ASSESSMENT
[FreeTextEntry1] : 49 y/o F with Pmhx Chiari I malformation, neck pain as well as chronic Migraines now significantly improved.   - continue Gabapentin to 200mg 2x/day  - Diclofenac 75 mg bid with meals prn  -continue tizanidine with caution of note, cyclobenzaprine previously caused grogginess. Continue heating pad, massage, stretching and HEP which she feels has been helpful.  -Rizatriptan prn as abortive  -Discussed local modalities including heat and topical, massage

## 2024-05-20 DIAGNOSIS — G93.5 COMPRESSION OF BRAIN: ICD-10-CM

## 2024-06-20 ENCOUNTER — APPOINTMENT (OUTPATIENT)
Dept: INTERNAL MEDICINE | Facility: CLINIC | Age: 49
End: 2024-06-20
Payer: COMMERCIAL

## 2024-06-20 ENCOUNTER — OUTPATIENT (OUTPATIENT)
Dept: OUTPATIENT SERVICES | Facility: HOSPITAL | Age: 49
LOS: 1 days | End: 2024-06-20
Payer: COMMERCIAL

## 2024-06-20 VITALS
DIASTOLIC BLOOD PRESSURE: 80 MMHG | BODY MASS INDEX: 24.8 KG/M2 | HEIGHT: 59 IN | HEART RATE: 69 BPM | WEIGHT: 123 LBS | SYSTOLIC BLOOD PRESSURE: 122 MMHG | OXYGEN SATURATION: 98 %

## 2024-06-20 DIAGNOSIS — M12.819 OTHER SPECIFIC ARTHROPATHIES, NOT ELSEWHERE CLASSIFIED, UNSPECIFIED SHOULDER: ICD-10-CM

## 2024-06-20 DIAGNOSIS — M25.511 PAIN IN RIGHT SHOULDER: ICD-10-CM

## 2024-06-20 DIAGNOSIS — M25.512 PAIN IN RIGHT SHOULDER: ICD-10-CM

## 2024-06-20 DIAGNOSIS — Z90.49 ACQUIRED ABSENCE OF OTHER SPECIFIED PARTS OF DIGESTIVE TRACT: Chronic | ICD-10-CM

## 2024-06-20 DIAGNOSIS — I10 ESSENTIAL (PRIMARY) HYPERTENSION: ICD-10-CM

## 2024-06-20 PROCEDURE — 99214 OFFICE O/P EST MOD 30 MIN: CPT

## 2024-06-20 PROCEDURE — G0463: CPT

## 2024-06-24 ENCOUNTER — NON-APPOINTMENT (OUTPATIENT)
Age: 49
End: 2024-06-24

## 2024-06-24 ENCOUNTER — EMERGENCY (EMERGENCY)
Facility: HOSPITAL | Age: 49
LOS: 1 days | Discharge: HOME | End: 2024-06-24
Attending: EMERGENCY MEDICINE
Payer: COMMERCIAL

## 2024-06-24 VITALS
HEIGHT: 59 IN | OXYGEN SATURATION: 99 % | DIASTOLIC BLOOD PRESSURE: 56 MMHG | TEMPERATURE: 98 F | RESPIRATION RATE: 18 BRPM | WEIGHT: 123.02 LBS | SYSTOLIC BLOOD PRESSURE: 124 MMHG | HEART RATE: 80 BPM

## 2024-06-24 VITALS
SYSTOLIC BLOOD PRESSURE: 122 MMHG | DIASTOLIC BLOOD PRESSURE: 83 MMHG | OXYGEN SATURATION: 98 % | HEART RATE: 57 BPM | RESPIRATION RATE: 18 BRPM | TEMPERATURE: 99 F

## 2024-06-24 DIAGNOSIS — Z90.49 ACQUIRED ABSENCE OF OTHER SPECIFIED PARTS OF DIGESTIVE TRACT: Chronic | ICD-10-CM

## 2024-06-24 PROCEDURE — 70450 CT HEAD/BRAIN W/O DYE: CPT | Mod: MC

## 2024-06-24 PROCEDURE — 99284 EMERGENCY DEPT VISIT MOD MDM: CPT | Mod: 25

## 2024-06-24 PROCEDURE — 99284 EMERGENCY DEPT VISIT MOD MDM: CPT

## 2024-06-24 PROCEDURE — 70450 CT HEAD/BRAIN W/O DYE: CPT | Mod: 26,MC

## 2024-06-24 RX ORDER — MECLIZINE HCL 12.5 MG
25 TABLET ORAL ONCE
Refills: 0 | Status: COMPLETED | OUTPATIENT
Start: 2024-06-24 | End: 2024-06-24

## 2024-06-24 RX ORDER — ACETAMINOPHEN 500 MG
975 TABLET ORAL ONCE
Refills: 0 | Status: COMPLETED | OUTPATIENT
Start: 2024-06-24 | End: 2024-06-24

## 2024-06-24 RX ADMIN — Medication 25 MILLIGRAM(S): at 15:30

## 2024-06-24 RX ADMIN — Medication 975 MILLIGRAM(S): at 15:15

## 2024-06-24 NOTE — ED ADULT NURSE NOTE - OBJECTIVE STATEMENT
48 year old female presented to ED with c/o of mechanical trip and fall over garden hose hitting head on AC unit on Saturday. no LOC. no blood thinners. pt denies CP, SOB, nausea/vomiting, numbness/tingling, fever, cough, chills, dizziness, headache, blurred vision, neuro intact. pt a&ox3, lung sounds clear, heart rate regular, abdomen soft nontender nondistended to palp. skin intact. Will continue to monitor and assess while offering support and reassurance.

## 2024-06-24 NOTE — ED PROVIDER NOTE - PHYSICAL EXAMINATION
On Physical Exam:  General: well appearing, in NAD, speaking clearly in full sentences and without difficulty; cooperative with exam  HEENT: TTP to R forehead, no deformities, no crepitis, no facial deformities, no raccoon eyes, no discharge or bleeding from ears, EOM intact,  PERRL, MMM  Neck: no neck tenderness, no nuchal rigidity  Cardiac: normal s1, s2; RRR; no MGR  Lungs: CTABL  Abdomen: soft nontender/nondistended  : no bladder tenderness or distension  Skin: warm, intact, no rash  Extremities: no peripheral edema, no gross deformities  Neuro: no gross neurologic deficits

## 2024-06-24 NOTE — ED PROVIDER NOTE - CARE PLAN
NURSING HOME PROGRESS NOTE  [facility]  DATE OF SERVICE: 09/27/23  LOCATION: [facility]   1949  74 year old (female)  ?  Patient is seen at the Skilled Nursing Facility at 8 AM on Wednesday, Sept 27, 2023 and case discussed with her nurse several times during the day and [facility] hospital  course also reviewed with PATRICIO Xavier with Dr Jay who was managing her case.  HPI   Pt was at [facility] for cellulitis of the buttocks, no sepsis, and is currently on Cefdinir and fluconazole.  Has COPD without recent exacerbation and is on home O2  2L NC.   She iives alone with POA living in [place],  But to me claims she has good support with a daily caregiver.  Mr Page clarifies that he believes she only has home care nurses.   DM was in good control and in fact Glipizide was stopped, and Spironolactone also held due to hypotension.     Pt is seen in her room, and asks how long she will need to stay.   At the time of my exam was willing to consider staying for PT and strengthening, concerned that she has not had PT yet but we discussed that it was only 8 AM.   Pt admits smoking 1 PPD in her home despite being on oxygen     Patient Active Problem List   Diagnosis   • Coronary artery disease involving native coronary artery of native heart without angina pectoris   • PAD (peripheral artery disease) (CMD)   • Hypertension   • Hyperlipidemia   • Tobacco abuse   • Screening for AAA (abdominal aortic aneurysm)   • Hypokalemia   • Diabetes mellitus with peripheral vascular disease (CMD)   • Chronic bronchitis (CMD)   • Dependence on supplemental oxygen     Past Medical History:   Diagnosis Date   • Abdominal aortic ectasia (CMD)    • Benign hypertension    • CAD (coronary artery disease)     2006 distal RCA occluded, PCI circ + LAD, normal LVF.   • Chronic low back pain     declined lumbar laminectomy, sedentary due to limiting symptoms.   • Diabetes mellitus (CMD)    • Hyperlipidemia    • PAD  (peripheral artery disease) (CMD)     mod-severe R SFA, mod L SFA (mild symptoms).   • Tobacco abuse       Past Surgical History:   Procedure Laterality Date   • CORONARY STENT PLACEMENT        Family History   Problem Relation Age of Onset   • Cancer Mother      Current Medications    ACETAMINOPHEN (TYLENOL) 325 MG TABLET    Take 650 mg by mouth every 4 hours as needed for Pain.    ALLOPURINOL (ZYLOPRIM) 100 MG TABLET    TAKE 1 TABLET BY MOUTH EVERY DAY    AMLODIPINE (NORVASC) 10 MG TABLET    TAKE 1 TABLET BY MOUTH EVERY DAY    ASPIRIN 81 MG TABLET    Take 81 mg by mouth in the morning and 81 mg in the evening.    DIPHENHYDRAMINE (BENADRYL) 25 MG CAPSULE    Take 25 mg by mouth 2 times daily as needed for Itching.    DOCUSATE SODIUM (COLACE) 100 MG CAPSULE    Take 100 mg by mouth 2 times daily as needed for Constipation.    EMOLLIENT (AQUAPHOR) OINTMENT    Apply three times daily    ENOXAPARIN (LOVENOX) 40 MG/0.4ML INJECTABLE SOLUTION    Inject 40 mg into the skin daily.    FAMOTIDINE (PEPCID) 20 MG TABLET    Take 40 mg by mouth 2 times daily as needed.    IPRATROPIUM-ALBUTEROL (DUONEB) 0.5-2.5 (3) MG/3ML NEBULIZER SOLUTION    Take 3 mLs by nebulization every 6 hours as needed for Wheezing.    LEVOTHYROXINE 25 MCG TABLET    TAKE ONE TABLET BY MOUTH EVERY DAY AT 6AM    LOSARTAN (COZAAR) 100 MG TABLET    TAKE 1 TABLET BY MOUTH EVERY DAY    METFORMIN (GLUCOPHAGE) 1000 MG TABLET    TAKE 1 TABLET BY MOUTH 2 TIMES A DAY WITH MEALS    METOPROLOL TARTRATE (LOPRESSOR) 25 MG TABLET    Take 25 mg by mouth daily.    NICOTINE (NICODERM) 21 MG/24HR PATCH    Place 1 patch onto the skin every 24 hours.    POLYETHYLENE GLYCOL (MIRALAX) 17 G PACKET    Take 17 g by mouth daily. Stir and dissolve powder in any 4 to 8 ounces of beverage, then drink.    ROPINIROLE (REQUIP) 1 MG TABLET    Take 1 mg by mouth nightly.    ROSUVASTATIN (CRESTOR) 40 MG TABLET    TAKE 1 TABLET BY MOUTH EVERY DAY    TRAMADOL (ULTRAM) 50 MG TABLET    Take 1  tablet by mouth every 8 hours as needed for Pain.     ALLERGIES:   Allergen Reactions   • Erythromycin Other (See Comments)   • Atorvastatin Other (See Comments)     Turn yellow     • Misc Natural Products Other (See Comments)     Myosins causing blisters        Review of Systems   Constitutional: Positive for fatigue.   Eyes: Negative.    Respiratory: Positive for shortness of breath.    Cardiovascular: Negative.    Gastrointestinal: Negative.    Endocrine: Negative.    Genitourinary: Negative.    Skin: Positive for rash.   Neurological: Negative.        142/72   93.3   87   98%   BS  112  RR 18    On 2L NC     Physical Exam    General:  No acute distress , Alert.   Head:  Normocephalic-atraumatic.   Neck:  Supple, No adenopathy.  Normal thyroid without mass or tenderness..   Eyes:  Normal conjunctivae and sclerae.  Pupils equal, round, reactive to light.    ENT:  Mucous membranes are moist.     Pharynx not injected  Cardiovascular:  Symmetrical pulses.  Regular, rate and rhythm without murmur.  Respiratory:  Normal respiratory effort.  Clear to auscultation.  No wheezes, rales or rhonchi.  Distant sounds, no wheeze no distress  Abdomen: Soft and nontender.  Normal bowel sounds.  No hepatomegaly or splenomegaly.   Musculoskeletal:  No deformity  No tenderness to palpation.  Extremities: No edema, no calf tenderness  Neurologic:   Oriented times 3   No focal deficits or lateralizing signs.  Psychiatric:   Cooperative.  Appropriate mood and affect.  Skin: large area of erythema of both buttocks without any open wound or streaking, no odor.     ASSESSMENT& PLAN      Problem List Items Addressed This Visit        Endocrine and Metabolic    Diabetes mellitus with peripheral vascular disease (CMD)       Pulmonary and Pneumonias    Dependence on supplemental oxygen (Chronic)    Chronic bronchitis (CMD)       Tobacco    Tobacco abuse   Other Visit Diagnoses     Cellulitis of buttock    -  Primary    Physical deconditioning              Cellulitis improving  DM reported in good control  COPD , chronic resp failure - stable    pt strongly urged to quit smoking or at least to not smoke in her home with oxygen running to to risk of fire and explosion  PT and OT with goal to return home  I had several phone calls throughout the day from nursing staff.   Pt insists on going  home    I do not think it is safe and appropriate as yet and pt is to sign out AMA if she insists on leaving.   RN informed.     Principal Discharge DX:	Headache   1 Principal Discharge DX:	Acute headache with normal neurologic examination  Secondary Diagnosis:	Closed head injury, initial encounter

## 2024-06-24 NOTE — ED PROVIDER NOTE - CLINICAL SUMMARY MEDICAL DECISION MAKING FREE TEXT BOX
48 year old fm with pmhx Chiari malformation (not compliant with home meds) presents to the ED with headache and head pain since saturday. patient reports on Saturday had a mechanical trip and fall in the backyard from the water hose, falling and striking her head to the AC unit.   r/o ICH  CT, pain management, reassess 48 year old fm with pmhx Chiari malformation (not compliant with home meds) presents to the ED with headache and head pain since saturday. patient reports on Saturday had a mechanical trip and fall in the backyard from the water hose, falling and striking her head to the AC unit.   r/o ICH  CT, pain management, reassess  RGUJRAL 48-year-old female history of Chiari malformation status post mechanical fall when tripping over a hose and then hit the air conditioner.  Positive trauma to the right side of her head.  Patient noted that she may have had a concussion and was resting at home and now persistent w HA and nausea and dizziness. No change in vision.  Denies any other injury.  Denies any recent illness chest pain shortness of breath fevers chills.  On exam GCS 15.  Right frontal 2 cm ecchymosis with swelling.  Pupils equal round and reactive to light.  No hyphema.  Extraocular muscles intact with mild horizontal nystagmus.  No posterior midline cervical thoracic lumbar tenderness no chest wall tenderness.  Abdomen soft nontender.  Will obtain a CT head treat for symptoms with pain control and meclizine.  Differential diagnosis includes concussion will rule out intracranial pathology.

## 2024-06-24 NOTE — ED PROVIDER NOTE - OBJECTIVE STATEMENT
48 year old fm with pmhx Chiari malformation (not compliant with home meds) presents to the ED with headache and head pain since saturday. patient reports on Saturday had a mechanical trip and fall in the backyard from the water hose, falling and striking her head to the AC unit. patient reports no LOC was able to self ambulate post fall and reports continuing ADLs without restriction. patient reports continuing to have a "burning" headache and feeling groggy since then so came to ED for evaluation. patient denies loss of urine or bowel control, paresthesias, difficulty ambulating, pain with EOM , chest pain, Shortness of Breath, abdominal pain, Nausea/Vomiting/Diarrhea, weakness, confusion, vision changes, urinary symptoms, syncope, discharge, bleeding, fevers

## 2024-06-24 NOTE — ED ADULT TRIAGE NOTE - PAIN RATING/NUMBER SCALE (0-10): ACTIVITY
7 (severe pain)
Quality 131: Pain Assessment And Follow-Up: Pain assessment using a standardized tool is documented as negative, no follow-up plan required
Quality 130: Documentation Of Current Medications In The Medical Record: Current Medications Documented
Detail Level: Detailed
Quality 110: Preventive Care And Screening: Influenza Immunization: Influenza immunization was not ordered or administered, reason not given

## 2024-06-24 NOTE — ED PROVIDER NOTE - PATIENT PORTAL LINK FT
You can access the FollowMyHealth Patient Portal offered by [facility] by registering at the following website: [email or web address]. By joining [facility]’s FollowMyHealth portal, you will also be able to view your health information using other applications (apps) compatible with our system.

## 2024-06-24 NOTE — ED PROVIDER NOTE - NSFOLLOWUPINSTRUCTIONS_ED_ALL_ED_FT
You were seen in the emergency department for head pain, your CT scan did not show any internal injuries.    Your pain could be related to a mild concussion.    Concussions will heal on their own everybody heals differently it could be several days that you are better could be several weeks.    You may use Tylenol 650mg every 8 hours or Motrin 600mg every 8 hours as needed for pain. These are over the counter medications.    Please follow-up with your regular doctor within 1 to 2 weeks.    Return to the emergency department if you develop repetitive vomiting or weakness on one side of the body versus the other.

## 2024-06-28 PROBLEM — M12.819 ROTATOR CUFF ARTHROPATHY: Status: ACTIVE | Noted: 2024-04-23

## 2024-06-28 NOTE — HISTORY OF PRESENT ILLNESS
[FreeTextEntry8] : 48F PMH chronic migraines, Chiari I malformation, IBS, presents for acute visit for shoulder pain.  Reported having shoulder pain for 2 months. R>L. Has tried PT for R shoulder but not for L yet. Lifting heavy object worsens the pain. Tylenol provides mild relief. PT provides moderate relief. Has not tried topical meds yet.

## 2024-07-01 DIAGNOSIS — M25.512 PAIN IN LEFT SHOULDER: ICD-10-CM

## 2024-07-01 DIAGNOSIS — M12.819 OTHER SPECIFIC ARTHROPATHIES, NOT ELSEWHERE CLASSIFIED, UNSPECIFIED SHOULDER: ICD-10-CM

## 2024-07-01 DIAGNOSIS — M25.511 PAIN IN RIGHT SHOULDER: ICD-10-CM

## 2024-07-10 ENCOUNTER — TRANSCRIPTION ENCOUNTER (OUTPATIENT)
Age: 49
End: 2024-07-10

## 2024-07-29 ENCOUNTER — OUTPATIENT (OUTPATIENT)
Dept: OUTPATIENT SERVICES | Facility: HOSPITAL | Age: 49
LOS: 1 days | End: 2024-07-29
Payer: COMMERCIAL

## 2024-07-29 ENCOUNTER — APPOINTMENT (OUTPATIENT)
Dept: INTERNAL MEDICINE | Facility: CLINIC | Age: 49
End: 2024-07-29
Payer: COMMERCIAL

## 2024-07-29 DIAGNOSIS — Z90.49 ACQUIRED ABSENCE OF OTHER SPECIFIED PARTS OF DIGESTIVE TRACT: Chronic | ICD-10-CM

## 2024-07-29 DIAGNOSIS — R51.9 HEADACHE, UNSPECIFIED: ICD-10-CM

## 2024-07-29 PROCEDURE — 99213 OFFICE O/P EST LOW 20 MIN: CPT | Mod: GC,95

## 2024-07-29 PROCEDURE — G0463: CPT

## 2024-07-29 NOTE — PHYSICAL EXAM
[No Acute Distress] : no acute distress [Well Nourished] : well nourished [Normal Sclera/Conjunctiva] : normal sclera/conjunctiva [Normal Outer Ear/Nose] : the outer ears and nose were normal in appearance [No JVD] : no jugular venous distention [No Lymphadenopathy] : no lymphadenopathy [No Respiratory Distress] : no respiratory distress  [Coordination Grossly Intact] : coordination grossly intact [No Focal Deficits] : no focal deficits

## 2024-07-31 ENCOUNTER — OUTPATIENT (OUTPATIENT)
Dept: OUTPATIENT SERVICES | Facility: HOSPITAL | Age: 49
LOS: 1 days | End: 2024-07-31

## 2024-07-31 DIAGNOSIS — Z90.49 ACQUIRED ABSENCE OF OTHER SPECIFIED PARTS OF DIGESTIVE TRACT: Chronic | ICD-10-CM

## 2024-07-31 DIAGNOSIS — I10 ESSENTIAL (PRIMARY) HYPERTENSION: ICD-10-CM

## 2024-08-05 NOTE — REVIEW OF SYSTEMS
[Fever] : no fever [Chills] : no chills [Night Sweats] : no night sweats [Discharge] : no discharge [Pain] : no pain [Earache] : no earache [Vision Problems] : no vision problems [Nasal Discharge] : no nasal discharge [Chest Pain] : no chest pain [Orthopnea] : no orthopnea [Shortness Of Breath] : no shortness of breath [Abdominal Pain] : no abdominal pain [Vomiting] : no vomiting [Dysuria] : no dysuria [Hematuria] : no hematuria [Joint Pain] : no joint pain [Muscle Pain] : no muscle pain [Itching] : no itching [Skin Rash] : no skin rash [Headache] : no headache [Dizziness] : no dizziness [Fainting] : no fainting [Confusion] : no confusion [Memory Loss] : no memory loss [Unsteady Walking] : no ataxia [Suicidal] : not suicidal [Easy Bleeding] : no easy bleeding [Easy Bruising] : no easy bruising

## 2024-08-05 NOTE — REVIEW OF SYSTEMS
[Fever] : no fever [Chills] : no chills [Night Sweats] : no night sweats [Pain] : no pain [Discharge] : no discharge [Earache] : no earache [Vision Problems] : no vision problems [Nasal Discharge] : no nasal discharge [Chest Pain] : no chest pain [Orthopnea] : no orthopnea [Shortness Of Breath] : no shortness of breath [Abdominal Pain] : no abdominal pain [Vomiting] : no vomiting [Dysuria] : no dysuria [Hematuria] : no hematuria [Joint Pain] : no joint pain [Muscle Pain] : no muscle pain [Itching] : no itching [Skin Rash] : no skin rash [Headache] : no headache [Dizziness] : no dizziness [Fainting] : no fainting [Confusion] : no confusion [Memory Loss] : no memory loss [Unsteady Walking] : no ataxia [Suicidal] : not suicidal [Easy Bleeding] : no easy bleeding [Easy Bruising] : no easy bruising

## 2024-08-05 NOTE — HISTORY OF PRESENT ILLNESS
[FreeTextEntry1] : F/U HA [de-identified] : 48F PMH chronic migraines, Chiari I malformation, IBS, presenting for post ED visit. This visit was conducted virtually. Had fall 1 month prior and was instructed to go to ED. ED w/u notable for negative CT Head. Patient currently denies any HA, scotoma, flashing lights, N/V, vomiting, confusion, lethargy. States is otherwise at her baseline.

## 2024-08-05 NOTE — ASSESSMENT
[FreeTextEntry1] : #Post Fall visit -diagnosed with post concussive trauma in ED month prior after fall -on this visit not endorsing neurologic, ocular, or vestibular symptoms of concern -CTH in ED (-) > no change in mgmt > RTC as needed

## 2024-08-05 NOTE — HISTORY OF PRESENT ILLNESS
48 yo with injury to her R palm after cutting it on a vegetable slicer.  Has continued to bleed.  Tetanus is UTD.  Requesting plastic surgery to repair [FreeTextEntry1] : F/U HA [de-identified] : 48F PMH chronic migraines, Chiari I malformation, IBS, presenting for post ED visit. This visit was conducted virtually. Had fall 1 month prior and was instructed to go to ED. ED w/u notable for negative CT Head. Patient currently denies any HA, scotoma, flashing lights, N/V, vomiting, confusion, lethargy. States is otherwise at her baseline.

## 2024-08-20 DIAGNOSIS — I10 ESSENTIAL (PRIMARY) HYPERTENSION: ICD-10-CM

## 2024-10-10 NOTE — ED PROVIDER NOTE - INTERNATIONAL TRAVEL
No Drysol Pregnancy And Lactation Text: This medication is considered safe during pregnancy and breast feeding. Litfulo Counseling: I discussed with the patient the risks of Litfulo therapy including but not limited to upper respiratory tract infections, shingles, cold sores, and nausea. Live vaccines should be avoided.  This medication has been linked to serious infections; higher rate of mortality; malignancy and lymphoproliferative disorders; major adverse cardiovascular events; thrombosis; gastrointestinal perforations; neutropenia; lymphopenia; anemia; liver enzyme elevations; and lipid elevations. Odomzo Counseling- I discussed with the patient the risks of Odomzo including but not limited to nausea, vomiting, diarrhea, constipation, weight loss, changes in the sense of taste, decreased appetite, muscle spasms, and hair loss.  The patient verbalized understanding of the proper use and possible adverse effects of Odomzo.  All of the patient's questions and concerns were addressed. Topical Metronidazole Counseling: Metronidazole is a topical antibiotic medication. You may experience burning, stinging, redness, or allergic reactions.  Please call our office if you develop any problems from using this medication. Siliq Counseling:  I discussed with the patient the risks of Siliq including but not limited to new or worsening depression, suicidal thoughts and behavior, immunosuppression, malignancy, posterior leukoencephalopathy syndrome, and serious infections.  The patient understands that monitoring is required including a PPD at baseline and must alert us or the primary physician if symptoms of infection or other concerning signs are noted. There is also a special program designed to monitor depression which is required with Siliq. Olanzapine Counseling- I discussed with the patient the common side effects of olanzapine including but are not limited to: lack of energy, dry mouth, increased appetite, sleepiness, tremor, constipation, dizziness, changes in behavior, or restlessness.  Explained that teenagers are more likely to experience headaches, abdominal pain, pain in the arms or legs, tiredness, and sleepiness.  Serious side effects include but are not limited: increased risk of death in elderly patients who are confused, have memory loss, or dementia-related psychosis; hyperglycemia; increased cholesterol and triglycerides; and weight gain. Klisyri Counseling:  I discussed with the patient the risks of Klisyri including but not limited to erythema, scaling, itching, weeping, crusting, and pain. Winlevi Pregnancy And Lactation Text: This medication is considered safe during pregnancy and breastfeeding. Clindamycin Counseling: I counseled the patient regarding use of clindamycin as an antibiotic for prophylactic and/or therapeutic purposes. Clindamycin is active against numerous classes of bacteria, including skin bacteria. Side effects may include nausea, diarrhea, gastrointestinal upset, rash, hives, yeast infections, and in rare cases, colitis. Glycopyrrolate Pregnancy And Lactation Text: This medication is Pregnancy Category B and is considered safe during pregnancy. It is unknown if it is excreted breast milk. Birth Control Pills Pregnancy And Lactation Text: This medication should be avoided if pregnant and for the first 30 days post-partum. Tetracycline Counseling: Patient counseled regarding possible photosensitivity and increased risk for sunburn.  Patient instructed to avoid sunlight, if possible.  When exposed to sunlight, patients should wear protective clothing, sunglasses, and sunscreen.  The patient was instructed to call the office immediately if the following severe adverse effects occur:  hearing changes, easy bruising/bleeding, severe headache, or vision changes.  The patient verbalized understanding of the proper use and possible adverse effects of tetracycline.  All of the patient's questions and concerns were addressed. Patient understands to avoid pregnancy while on therapy due to potential birth defects. Benzoyl Peroxide Pregnancy And Lactation Text: This medication is Pregnancy Category C. It is unknown if benzoyl peroxide is excreted in breast milk. SSKI Counseling:  I discussed with the patient the risks of SSKI including but not limited to thyroid abnormalities, metallic taste, GI upset, fever, headache, acne, arthralgias, paraesthesias, lymphadenopathy, easy bleeding, arrhythmias, and allergic reaction. Cyclosporine Counseling:  I discussed with the patient the risks of cyclosporine including but not limited to hypertension, gingival hyperplasia,myelosuppression, immunosuppression, liver damage, kidney damage, neurotoxicity, lymphoma, and serious infections. The patient understands that monitoring is required including baseline blood pressure, CBC, CMP, lipid panel and uric acid, and then 1-2 times monthly CMP and blood pressure. Protopic Counseling: Patient may experience a mild burning sensation during topical application. Protopic is not approved in children less than 2 years of age. There have been case reports of hematologic and skin malignancies in patients using topical calcineurin inhibitors although causality is questionable. Include Pregnancy/Lactation Warning?: No Ketoconazole Pregnancy And Lactation Text: This medication is Pregnancy Category C and it isn't know if it is safe during pregnancy. It is also excreted in breast milk and breast feeding isn't recommended. Zoryve Counseling:  I discussed with the patient that Zoryve is not for use in the eyes, mouth or vagina. The most commonly reported side effects include diarrhea, headache, insomnia, application site pain, upper respiratory tract infections, and urinary tract infections.  All of the patient's questions and concerns were addressed. Clofazimine Counseling:  I discussed with the patient the risks of clofazimine including but not limited to skin and eye pigmentation, liver damage, nausea/vomiting, gastrointestinal bleeding and allergy. Metronidazole Pregnancy And Lactation Text: This medication is Pregnancy Category B and considered safe during pregnancy.  It is also excreted in breast milk. Soolantra Pregnancy And Lactation Text: This medication is Pregnancy Category C. This medication is considered safe during breast feeding. Simponi Pregnancy And Lactation Text: The risk during pregnancy and breastfeeding is uncertain with this medication. Elidel Counseling: Patient may experience a mild burning sensation during topical application. Elidel is not approved in children less than 2 years of age. There have been case reports of hematologic and skin malignancies in patients using topical calcineurin inhibitors although causality is questionable. Litfulo Pregnancy And Lactation Text: Based on animal studies, Lifulo may cause embryo-fetal harm when administered to pregnant women.  The medication should not be used in pregnancy.  Breastfeeding is not recommended during treatment. Humira Pregnancy And Lactation Text: This medication is Pregnancy Category B and is considered safe during pregnancy. It is unknown if this medication is excreted in breast milk. Klisyri Pregnancy And Lactation Text: It is unknown if this medication can harm a developing fetus or if it is excreted in breast milk. Tremfya Counseling: I discussed with the patient the risks of guselkumab including but not limited to immunosuppression, serious infections, and drug reactions.  The patient understands that monitoring is required including a PPD at baseline and must alert us or the primary physician if symptoms of infection or other concerning signs are noted. Olanzapine Pregnancy And Lactation Text: This medication is pregnancy category C.   There are no adequate and well controlled trials with olanzapine in pregnant females.  Olanzapine should be used during pregnancy only if the potential benefit justifies the potential risk to the fetus.   In a study in lactating healthy women, olanzapine was excreted in breast milk.  It is recommended that women taking olanzapine should not breast feed. Cimzia Counseling:  I discussed with the patient the risks of Cimzia including but not limited to immunosuppression, allergic reactions and infections.  The patient understands that monitoring is required including a PPD at baseline and must alert us or the primary physician if symptoms of infection or other concerning signs are noted. Tetracycline Pregnancy And Lactation Text: This medication is Pregnancy Category D and not consider safe during pregnancy. It is also excreted in breast milk. Acitretin Counseling:  I discussed with the patient the risks of acitretin including but not limited to hair loss, dry lips/skin/eyes, liver damage, hyperlipidemia, depression/suicidal ideation, photosensitivity.  Serious rare side effects can include but are not limited to pancreatitis, pseudotumor cerebri, bony changes, clot formation/stroke/heart attack.  Patient understands that alcohol is contraindicated since it can result in liver toxicity and significantly prolong the elimination of the drug by many years. Hydroxychloroquine Counseling:  I discussed with the patient that a baseline ophthalmologic exam is needed at the start of therapy and every year thereafter while on therapy. A CBC may also be warranted for monitoring.  The side effects of this medication were discussed with the patient, including but not limited to agranulocytosis, aplastic anemia, seizures, rashes, retinopathy, and liver toxicity. Patient instructed to call the office should any adverse effect occur.  The patient verbalized understanding of the proper use and possible adverse effects of Plaquenil.  All the patient's questions and concerns were addressed. Carac Counseling:  I discussed with the patient the risks of Carac including but not limited to erythema, scaling, itching, weeping, crusting, and pain. Odomzo Pregnancy And Lactation Text: This medication is Pregnancy Category X and is absolutely contraindicated during pregnancy. It is unknown if it is excreted in breast milk. Hyrimoz Counseling:  I discussed with the patient the risks of adalimumab including but not limited to myelosuppression, immunosuppression, autoimmune hepatitis, demyelinating diseases, lymphoma, and serious infections.  The patient understands that monitoring is required including a PPD at baseline and must alert us or the primary physician if symptoms of infection or other concerning signs are noted. Terbinafine Counseling: Patient counseling regarding adverse effects of terbinafine including but not limited to headache, diarrhea, rash, upset stomach, liver function test abnormalities, itching, taste/smell disturbance, nausea, abdominal pain, and flatulence.  There is a rare possibility of liver failure that can occur when taking terbinafine.  The patient understands that a baseline LFT and kidney function test may be required. The patient verbalized understanding of the proper use and possible adverse effects of terbinafine.  All of the patient's questions and concerns were addressed. VTAMA Counseling: I discussed with the patient that VTAMA is not for use in the eyes, mouth or mouth. They should call the office if they develop any signs of allergic reactions to VTAMA. The patient verbalized understanding of the proper use and possible adverse effects of VTAMA.  All of the patient's questions and concerns were addressed. Minocycline Counseling: Patient advised regarding possible photosensitivity and discoloration of the teeth, skin, lips, tongue and gums.  Patient instructed to avoid sunlight, if possible.  When exposed to sunlight, patients should wear protective clothing, sunglasses, and sunscreen.  The patient was instructed to call the office immediately if the following severe adverse effects occur:  hearing changes, easy bruising/bleeding, severe headache, or vision changes.  The patient verbalized understanding of the proper use and possible adverse effects of minocycline.  All of the patient's questions and concerns were addressed. Spironolactone Counseling: Patient advised regarding risks of diarrhea, abdominal pain, hyperkalemia, birth defects (for female patients), liver toxicity and renal toxicity. The patient may need blood work to monitor liver and kidney function and potassium levels while on therapy. The patient verbalized understanding of the proper use and possible adverse effects of spironolactone.  All of the patient's questions and concerns were addressed. Topical Retinoid counseling:  Patient advised to apply a pea-sized amount only at bedtime and wait 30 minutes after washing their face before applying.  If too drying, patient may add a non-comedogenic moisturizer. The patient verbalized understanding of the proper use and possible adverse effects of retinoids.  All of the patient's questions and concerns were addressed. Albendazole Counseling:  I discussed with the patient the risks of albendazole including but not limited to cytopenia, kidney damage, nausea/vomiting and severe allergy.  The patient understands that this medication is being used in an off-label manner. Topical Metronidazole Pregnancy And Lactation Text: This medication is Pregnancy Category B and considered safe during pregnancy.  It is also considered safe to use while breastfeeding. Clofazimine Pregnancy And Lactation Text: This medication is Pregnancy Category C and isn't considered safe during pregnancy. It is excreted in breast milk. Minoxidil Counseling: Minoxidil is a topical medication which can increase blood flow where it is applied. It is uncertain how this medication increases hair growth. Side effects are uncommon and include stinging and allergic reactions. Vtama Pregnancy And Lactation Text: It is unknown if this medication can cause problems during pregnancy and breastfeeding. Cimzia Pregnancy And Lactation Text: This medication crosses the placenta but can be considered safe in certain situations. Cimzia may be excreted in breast milk. Sski Pregnancy And Lactation Text: This medication is Pregnancy Category D and isn't considered safe during pregnancy. It is excreted in breast milk. Cyclosporine Pregnancy And Lactation Text: This medication is Pregnancy Category C and it isn't know if it is safe during pregnancy. This medication is excreted in breast milk. Protopic Pregnancy And Lactation Text: This medication is Pregnancy Category C. It is unknown if this medication is excreted in breast milk when applied topically. Carac Pregnancy And Lactation Text: This medication is Pregnancy Category X and contraindicated in pregnancy and in women who may become pregnant. It is unknown if this medication is excreted in breast milk. Oral Minoxidil Counseling- I discussed with the patient the risks of oral minoxidil including but not limited to shortness of breath, swelling of the feet or ankles, dizziness, lightheadedness, unwanted hair growth and allergic reaction.  The patient verbalized understanding of the proper use and possible adverse effects of oral minoxidil.  All of the patient's questions and concerns were addressed. Skyrizi Counseling: I discussed with the patient the risks of risankizumab-rzaa including but not limited to immunosuppression, and serious infections.  The patient understands that monitoring is required including a PPD at baseline and must alert us or the primary physician if symptoms of infection or other concerning signs are noted. Elidel Pregnancy And Lactation Text: This medication is Pregnancy Category C. It is unknown if this medication is excreted in breast milk. Olumiant Counseling: I discussed with the patient the risks of Olumiant therapy including but not limited to upper respiratory tract infections, shingles, cold sores, and nausea. Live vaccines should be avoided.  This medication has been linked to serious infections; higher rate of mortality; malignancy and lymphoproliferative disorders; major adverse cardiovascular events; thrombosis; gastrointestinal perforations; neutropenia; lymphopenia; anemia; liver enzyme elevations; and lipid elevations. Albendazole Pregnancy And Lactation Text: This medication is Pregnancy Category C and it isn't known if it is safe during pregnancy. It is also excreted in breast milk. Colchicine Counseling:  Patient counseled regarding adverse effects including but not limited to stomach upset (nausea, vomiting, stomach pain, or diarrhea).  Patient instructed to limit alcohol consumption while taking this medication.  Colchicine may reduce blood counts especially with prolonged use.  The patient understands that monitoring of kidney function and blood counts may be required, especially at baseline. The patient verbalized understanding of the proper use and possible adverse effects of colchicine.  All of the patient's questions and concerns were addressed. Acitretin Pregnancy And Lactation Text: This medication is Pregnancy Category X and should not be given to women who are pregnant or may become pregnant in the future. This medication is excreted in breast milk. Hydroxychloroquine Pregnancy And Lactation Text: This medication has been shown to cause fetal harm but it isn't assigned a Pregnancy Risk Category. There are small amounts excreted in breast milk. Spironolactone Pregnancy And Lactation Text: This medication can cause feminization of the male fetus and should be avoided during pregnancy. The active metabolite is also found in breast milk. Minoxidil Pregnancy And Lactation Text: This medication has not been assigned a Pregnancy Risk Category but animal studies failed to show danger with the topical medication. It is unknown if the medication is excreted in breast milk. Fluconazole Counseling:  Patient counseled regarding adverse effects of fluconazole including but not limited to headache, diarrhea, nausea, upset stomach, liver function test abnormalities, taste disturbance, and stomach pain.  There is a rare possibility of liver failure that can occur when taking fluconazole.  The patient understands that monitoring of LFTs and kidney function test may be required, especially at baseline. The patient verbalized understanding of the proper use and possible adverse effects of fluconazole.  All of the patient's questions and concerns were addressed. Topical Steroids Counseling: I discussed with the patient that prolonged use of topical steroids can result in the increased appearance of superficial blood vessels (telangiectasias), lightening (hypopigmentation) and thinning of the skin (atrophy).  Patient understands to avoid using high potency steroids in skin folds, the groin or the face.  The patient verbalized understanding of the proper use and possible adverse effects of topical steroids.  All of the patient's questions and concerns were addressed. Methotrexate Counseling:  Patient counseled regarding adverse effects of methotrexate including but not limited to nausea, vomiting, abnormalities in liver function tests. Patients may develop mouth sores, rash, diarrhea, and abnormalities in blood counts. The patient understands that monitoring is required including LFT's and blood counts.  There is a rare possibility of scarring of the liver and lung problems that can occur when taking methotrexate. Persistent nausea, loss of appetite, pale stools, dark urine, cough, and shortness of breath should be reported immediately. Patient advised to discontinue methotrexate treatment at least three months before attempting to become pregnant.  I discussed the need for folate supplements while taking methotrexate.  These supplements can decrease side effects during methotrexate treatment. The patient verbalized understanding of the proper use and possible adverse effects of methotrexate.  All of the patient's questions and concerns were addressed. Cosentyx Counseling:  I discussed with the patient the risks of Cosentyx including but not limited to worsening of Crohn's disease, immunosuppression, allergic reactions and infections.  The patient understands that monitoring is required including a PPD at baseline and must alert us or the primary physician if symptoms of infection or other concerning signs are noted. Dutasteride Male Counseling: Dustasteride Counseling:  I discussed with the patient the risks of use of dutasteride including but not limited to decreased libido, decreased ejaculate volume, and gynecomastia. Women who can become pregnant should not handle medication.  All of the patient's questions and concerns were addressed. Simponi Counseling:  I discussed with the patient the risks of golimumab including but not limited to myelosuppression, immunosuppression, autoimmune hepatitis, demyelinating diseases, lymphoma, and serious infections.  The patient understands that monitoring is required including a PPD at baseline and must alert us or the primary physician if symptoms of infection or other concerning signs are noted. Terbinafine Pregnancy And Lactation Text: This medication is Pregnancy Category B and is considered safe during pregnancy. It is also excreted in breast milk and breast feeding isn't recommended. Qbrexza Counseling:  I discussed with the patient the risks of Qbrexza including but not limited to headache, mydriasis, blurred vision, dry eyes, nasal dryness, dry mouth, dry throat, dry skin, urinary hesitation, and constipation.  Local skin reactions including erythema, burning, stinging, and itching can also occur. Zyclara Counseling:  I discussed with the patient the risks of imiquimod including but not limited to erythema, scaling, itching, weeping, crusting, and pain.  Patient understands that the inflammatory response to imiquimod is variable from person to person and was educated regarded proper titration schedule.  If flu-like symptoms develop, patient knows to discontinue the medication and contact us. Oral Minoxidil Pregnancy And Lactation Text: This medication should only be used when clearly needed if you are pregnant, attempting to become pregnant or breast feeding. Xolair Counseling:  Patient informed of potential adverse effects including but not limited to fever, muscle aches, rash and allergic reactions.  The patient verbalized understanding of the proper use and possible adverse effects of Xolair.  All of the patient's questions and concerns were addressed. Olumiant Pregnancy And Lactation Text: Based on animal studies, Olumiant may cause embryo-fetal harm when administered to pregnant women.  The medication should not be used in pregnancy.  Breastfeeding is not recommended during treatment. Topical Steroids Applications Pregnancy And Lactation Text: Most topical steroids are considered safe to use during pregnancy and lactation.  Any topical steroid applied to the breast or nipple should be washed off before breastfeeding. Eucrisa Counseling: Patient may experience a mild burning sensation during topical application. Eucrisa is not approved in children less than 3 months of age. Thalidomide Counseling: I discussed with the patient the risks of thalidomide including but not limited to birth defects, anxiety, weakness, chest pain, dizziness, cough and severe allergy. Ivermectin Counseling:  Patient instructed to take medication on an empty stomach with a full glass of water.  Patient informed of potential adverse effects including but not limited to nausea, diarrhea, dizziness, itching, and swelling of the extremities or lymph nodes.  The patient verbalized understanding of the proper use and possible adverse effects of ivermectin.  All of the patient's questions and concerns were addressed. Bexarotene Counseling:  I discussed with the patient the risks of bexarotene including but not limited to hair loss, dry lips/skin/eyes, liver abnormalities, hyperlipidemia, pancreatitis, depression/suicidal ideation, photosensitivity, drug rash/allergic reactions, hypothyroidism, anemia, leukopenia, infection, cataracts, and teratogenicity.  Patient understands that they will need regular blood tests to check lipid profile, liver function tests, white blood cell count, thyroid function tests and pregnancy test if applicable. Low Dose Naltrexone Counseling- I discussed with the patient the potential risks and side effects of low dose naltrexone including but not limited to: more vivid dreams, headaches, nausea, vomiting, abdominal pain, fatigue, dizziness, and anxiety. Calcipotriene Counseling:  I discussed with the patient the risks of calcipotriene including but not limited to erythema, scaling, itching, and irritation. Quinolones Counseling:  I discussed with the patient the risks of fluoroquinolones including but not limited to GI upset, allergic reaction, drug rash, diarrhea, dizziness, photosensitivity, yeast infections, liver function test abnormalities, tendonitis/tendon rupture. Methotrexate Pregnancy And Lactation Text: This medication is Pregnancy Category X and is known to cause fetal harm. This medication is excreted in breast milk. Tazorac Counseling:  Patient advised that medication is irritating and drying.  Patient may need to apply sparingly and wash off after an hour before eventually leaving it on overnight.  The patient verbalized understanding of the proper use and possible adverse effects of tazorac.  All of the patient's questions and concerns were addressed. Fluconazole Pregnancy And Lactation Text: This medication is Pregnancy Category C and it isn't know if it is safe during pregnancy. It is also excreted in breast milk. Rinvoq Counseling: I discussed with the patient the risks of Rinvoq therapy including but not limited to upper respiratory tract infections, shingles, cold sores, bronchitis, nausea, cough, fever, acne, and headache. Live vaccines should be avoided.  This medication has been linked to serious infections; higher rate of mortality; malignancy and lymphoproliferative disorders; major adverse cardiovascular events; thrombosis; thrombocytopenia, anemia, and neutropenia; lipid elevations; liver enzyme elevations; and gastrointestinal perforations. Clindamycin Pregnancy And Lactation Text: This medication can be used in pregnancy if certain situations. Clindamycin is also present in breast milk. Ilumya Counseling: I discussed with the patient the risks of tildrakizumab including but not limited to immunosuppression, malignancy, posterior leukoencephalopathy syndrome, and serious infections.  The patient understands that monitoring is required including a PPD at baseline and must alert us or the primary physician if symptoms of infection or other concerning signs are noted. Xolair Pregnancy And Lactation Text: This medication is Pregnancy Category B and is considered safe during pregnancy. This medication is excreted in breast milk. Azithromycin Counseling:  I discussed with the patient the risks of azithromycin including but not limited to GI upset, allergic reaction, drug rash, diarrhea, and yeast infections. Dutasteride Female Counseling: Dutasteride Counseling:  I discussed with the patient the risks of use of dutasteride including but not limited to decreased libido and sexual dysfunction. Explained the teratogenic nature of the medication and stressed the importance of not getting pregnant during treatment. All of the patient's questions and concerns were addressed. Qbrexza Pregnancy And Lactation Text: There is no available data on Qbrexza use in pregnant women.  There is no available data on Qbrexza use in lactation. Mirvaso Counseling: Mirvaso is a topical medication which can decrease superficial blood flow where applied. Side effects are uncommon and include stinging, redness and allergic reactions. Bexarotene Pregnancy And Lactation Text: This medication is Pregnancy Category X and should not be given to women who are pregnant or may become pregnant. This medication should not be used if you are breast feeding. Low Dose Naltrexone Pregnancy And Lactation Text: Naltrexone is pregnancy category C.  There have been no adequate and well-controlled studies in pregnant women.  It should be used in pregnancy only if the potential benefit justifies the potential risk to the fetus.   Limited data indicates that naltrexone is minimally excreted into breastmilk. Cimetidine Counseling:  I discussed with the patient the risks of Cimetidine including but not limited to gynecomastia, headache, diarrhea, nausea, drowsiness, arrhythmias, pancreatitis, skin rashes, psychosis, bone marrow suppression and kidney toxicity. Spevigo Counseling: I discussed with the patient the risks of Spevigo including but not limited to fatigue, nasuea, vomiting, headache, pruritus, urinary tract infection, an infusion related reactions.  The patient understands that monitoring is required including screening for tuberculosis at baseline and yearly screening thereafter while continuing Spevigo therapy. They should contact us if symptoms of infection or other concerning signs are noted. Otezla Counseling: The side effects of Otezla were discussed with the patient, including but not limited to worsening or new depression, weight loss, diarrhea, nausea, upper respiratory tract infection, and headache. Patient instructed to call the office should any adverse effect occur.  The patient verbalized understanding of the proper use and possible adverse effects of Otezla.  All the patient's questions and concerns were addressed. Azathioprine Counseling:  I discussed with the patient the risks of azathioprine including but not limited to myelosuppression, immunosuppression, hepatotoxicity, lymphoma, and infections.  The patient understands that monitoring is required including baseline LFTs, Creatinine, possible TPMP genotyping and weekly CBCs for the first month and then every 2 weeks thereafter.  The patient verbalized understanding of the proper use and possible adverse effects of azathioprine.  All of the patient's questions and concerns were addressed. Aklief counseling:  Patient advised to apply a pea-sized amount only at bedtime and wait 30 minutes after washing their face before applying.  If too drying, patient may add a non-comedogenic moisturizer.  The most commonly reported side effects including irritation, redness, scaling, dryness, stinging, burning, itching, and increased risk of sunburn.  The patient verbalized understanding of the proper use and possible adverse effects of retinoids.  All of the patient's questions and concerns were addressed. Calcipotriene Pregnancy And Lactation Text: The use of this medication during pregnancy or lactation is not recommended as there is insufficient data. Topical Sulfur Applications Counseling: Topical Sulfur Counseling: Patient counseled that this medication may cause skin irritation or allergic reactions.  In the event of skin irritation, the patient was advised to reduce the amount of the drug applied or use it less frequently.   The patient verbalized understanding of the proper use and possible adverse effects of topical sulfur application.  All of the patient's questions and concerns were addressed. Tranexamic Acid Counseling:  Patient advised of the small risk of bleeding problems with tranexamic acid. They were also instructed to call if they developed any nausea, vomiting or diarrhea. All of the patient's questions and concerns were addressed. Prednisone Counseling:  I discussed with the patient the risks of prolonged use of prednisone including but not limited to weight gain, insomnia, osteoporosis, mood changes, diabetes, susceptibility to infection, glaucoma and high blood pressure.  In cases where prednisone use is prolonged, patients should be monitored with blood pressure checks, serum glucose levels and an eye exam.  Additionally, the patient may need to be placed on GI prophylaxis, PCP prophylaxis, and calcium and vitamin D supplementation and/or a bisphosphonate.  The patient verbalized understanding of the proper use and the possible adverse effects of prednisone.  All of the patient's questions and concerns were addressed. Doxycycline Counseling:  Patient counseled regarding possible photosensitivity and increased risk for sunburn.  Patient instructed to avoid sunlight, if possible.  When exposed to sunlight, patients should wear protective clothing, sunglasses, and sunscreen.  The patient was instructed to call the office immediately if the following severe adverse effects occur:  hearing changes, easy bruising/bleeding, severe headache, or vision changes.  The patient verbalized understanding of the proper use and possible adverse effects of doxycycline.  All of the patient's questions and concerns were addressed. Dutasteride Pregnancy And Lactation Text: This medication is absolutely contraindicated in women, especially during pregnancy and breast feeding. Feminization of male fetuses is possible if taking while pregnant. Rhofade Counseling: Rhofade is a topical medication which can decrease superficial blood flow where applied. Side effects are uncommon and include stinging, redness and allergic reactions. Azithromycin Pregnancy And Lactation Text: This medication is considered safe during pregnancy and is also secreted in breast milk. Dapsone Counseling: I discussed with the patient the risks of dapsone including but not limited to hemolytic anemia, agranulocytosis, rashes, methemoglobinemia, kidney failure, peripheral neuropathy, headaches, GI upset, and liver toxicity.  Patients who start dapsone require monitoring including baseline LFTs and weekly CBCs for the first month, then every month thereafter.  The patient verbalized understanding of the proper use and possible adverse effects of dapsone.  All of the patient's questions and concerns were addressed. Dupixent Counseling: I discussed with the patient the risks of dupilumab including but not limited to eye infection and irritation, cold sores, injection site reactions, worsening of asthma, allergic reactions and increased risk of parasitic infection.  Live vaccines should be avoided while taking dupilumab. Dupilumab will also interact with certain medications such as warfarin and cyclosporine. The patient understands that monitoring is required and they must alert us or the primary physician if symptoms of infection or other concerning signs are noted. Hydroquinone Counseling:  Patient advised that medication may result in skin irritation, lightening (hypopigmentation), dryness, and burning.  In the event of skin irritation, the patient was advised to reduce the amount of the drug applied or use it less frequently.  Rarely, spots that are treated with hydroquinone can become darker (pseudoochronosis).  Should this occur, patient instructed to stop medication and call the office. The patient verbalized understanding of the proper use and possible adverse effects of hydroquinone.  All of the patient's questions and concerns were addressed. Tazorac Pregnancy And Lactation Text: This medication is not safe during pregnancy. It is unknown if this medication is excreted in breast milk. Azathioprine Pregnancy And Lactation Text: This medication is Pregnancy Category D and isn't considered safe during pregnancy. It is unknown if this medication is excreted in breast milk. Spevigo Pregnancy And Lactation Text: The risk during pregnancy and breastfeeding is uncertain with this medication. This medication does cross the placenta. It is unknown if this medication is found in breast milk. Griseofulvin Counseling:  I discussed with the patient the risks of griseofulvin including but not limited to photosensitivity, cytopenia, liver damage, nausea/vomiting and severe allergy.  The patient understands that this medication is best absorbed when taken with a fatty meal (e.g., ice cream or french fries). Mirvaso Pregnancy And Lactation Text: This medication has not been assigned a Pregnancy Risk Category. It is unknown if the medication is excreted in breast milk. Otezla Pregnancy And Lactation Text: This medication is Pregnancy Category C and it isn't known if it is safe during pregnancy. It is unknown if it is excreted in breast milk. Rinvoq Pregnancy And Lactation Text: Based on animal studies, Rinvoq may cause embryo-fetal harm when administered to pregnant women.  The medication should not be used in pregnancy.  Breastfeeding is not recommended during treatment and for 6 days after the last dose. Niacinamide Counseling: I recommended taking niacin or niacinamide, also know as vitamin B3, twice daily. Recent evidence suggests that taking vitamin B3 (500 mg twice daily) can reduce the risk of actinic keratoses and non-melanoma skin cancers. Side effects of vitamin B3 include flushing and headache. Topical Clindamycin Counseling: Patient counseled that this medication may cause skin irritation or allergic reactions.  In the event of skin irritation, the patient was advised to reduce the amount of the drug applied or use it less frequently.   The patient verbalized understanding of the proper use and possible adverse effects of clindamycin.  All of the patient's questions and concerns were addressed. Erivedge Counseling- I discussed with the patient the risks of Erivedge including but not limited to nausea, vomiting, diarrhea, constipation, weight loss, changes in the sense of taste, decreased appetite, muscle spasms, and hair loss.  The patient verbalized understanding of the proper use and possible adverse effects of Erivedge.  All of the patient's questions and concerns were addressed. Cantharidin Counseling:  I discussed with the patient the risks of Cantharidin including but not limited to pain, redness, burning, itching, and blistering. Infliximab Counseling:  I discussed with the patient the risks of infliximab including but not limited to myelosuppression, immunosuppression, autoimmune hepatitis, demyelinating diseases, lymphoma, and serious infections.  The patient understands that monitoring is required including a PPD at baseline and must alert us or the primary physician if symptoms of infection or other concerning signs are noted. Opioid Counseling: I discussed with the patient the potential side effects of opioids including but not limited to addiction, altered mental status, and depression. I stressed avoiding alcohol, benzodiazepines, muscle relaxants and sleep aids unless specifically okayed by a physician. The patient verbalized understanding of the proper use and possible adverse effects of opioids. All of the patient's questions and concerns were addressed. They were instructed to flush the remaining pills down the toilet if they did not need them for pain. Isotretinoin Counseling: Patient should get monthly blood tests, not donate blood, not drive at night if vision affected, not share medication, and not undergo elective surgery for 6 months after tx completed. Side effects reviewed, pt to contact office should one occur. Topical Sulfur Applications Pregnancy And Lactation Text: This medication is considered safe during pregnancy and breast feeding secondary to limited systemic absorption. Bactrim Counseling:  I discussed with the patient the risks of sulfa antibiotics including but not limited to GI upset, allergic reaction, drug rash, diarrhea, dizziness, photosensitivity, and yeast infections.  Rarely, more serious reactions can occur including but not limited to aplastic anemia, agranulocytosis, methemoglobinemia, blood dyscrasias, liver or kidney failure, lung infiltrates or desquamative/blistering drug rashes. Finasteride Male Counseling: Finasteride Counseling:  I discussed with the patient the risks of use of finasteride including but not limited to decreased libido, decreased ejaculate volume, gynecomastia, and depression. Women should not handle medication.  All of the patient's questions and concerns were addressed. Rifampin Counseling: I discussed with the patient the risks of rifampin including but not limited to liver damage, kidney damage, red-orange body fluids, nausea/vomiting and severe allergy. Dapsone Pregnancy And Lactation Text: This medication is Pregnancy Category C and is not considered safe during pregnancy or breast feeding. Aklief Pregnancy And Lactation Text: It is unknown if this medication is safe to use during pregnancy.  It is unknown if this medication is excreted in breast milk.  Breastfeeding women should use the topical cream on the smallest area of the skin for the shortest time needed while breastfeeding.  Do not apply to nipple and areola. Oxybutynin Counseling:  I discussed with the patient the risks of oxybutynin including but not limited to skin rash, drowsiness, dry mouth, difficulty urinating, and blurred vision. Cellcept Counseling:  I discussed with the patient the risks of mycophenolate mofetil including but not limited to infection/immunosuppression, GI upset, hypokalemia, hypercholesterolemia, bone marrow suppression, lymphoproliferative disorders, malignancy, GI ulceration/bleed/perforation, colitis, interstitial lung disease, kidney failure, progressive multifocal leukoencephalopathy, and birth defects.  The patient understands that monitoring is required including a baseline creatinine and regular CBC testing. In addition, patient must alert us immediately if symptoms of infection or other concerning signs are noted. Opzelura Counseling:  I discussed with the patient the risks of Opzelura including but not limited to nasopharngitis, bronchitis, ear infection, eosinophila, hives, diarrhea, folliculitis, tonsillitis, and rhinorrhea.  Taken orally, this medication has been linked to serious infections; higher rate of mortality; malignancy and lymphoproliferative disorders; major adverse cardiovascular events; thrombosis; thrombocytopenia, anemia, and neutropenia; and lipid elevations. Griseofulvin Pregnancy And Lactation Text: This medication is Pregnancy Category X and is known to cause serious birth defects. It is unknown if this medication is excreted in breast milk but breast feeding should be avoided. Dupixent Pregnancy And Lactation Text: This medication likely crosses the placenta but the risk for the fetus is uncertain. This medication is excreted in breast milk. Cantharidin Pregnancy And Lactation Text: This medication has not been proven safe during pregnancy. It is unknown if this medication is excreted in breast milk. Doxycycline Pregnancy And Lactation Text: This medication is Pregnancy Category D and not consider safe during pregnancy. It is also excreted in breast milk but is considered safe for shorter treatment courses. Tranexamic Acid Pregnancy And Lactation Text: It is unknown if this medication is safe during pregnancy or breast feeding. 5-Fu Counseling: 5-Fluorouracil Counseling:  I discussed with the patient the risks of 5-fluorouracil including but not limited to erythema, scaling, itching, weeping, crusting, and pain. Doxepin Counseling:  Patient advised that the medication is sedating and not to drive a car after taking this medication. Patient informed of potential adverse effects including but not limited to dry mouth, urinary retention, and blurry vision.  The patient verbalized understanding of the proper use and possible adverse effects of doxepin.  All of the patient's questions and concerns were addressed. Opioid Pregnancy And Lactation Text: These medications can lead to premature delivery and should be avoided during pregnancy. These medications are also present in breast milk in small amounts. Sotyktu Counseling:  I discussed the most common side effects of Sotyktu including: common cold, sore throat, sinus infections, cold sores, canker sores, folliculitis, and acne.  I also discussed more serious side effects of Sotyktu including but not limited to: serious allergic reactions; increased risk for infections such as TB; cancers such as lymphomas; rhabdomyolysis and elevated CPK; and elevated triglycerides and liver enzymes.  Stelara Counseling:  I discussed with the patient the risks of ustekinumab including but not limited to immunosuppression, malignancy, posterior leukoencephalopathy syndrome, and serious infections.  The patient understands that monitoring is required including a PPD at baseline and must alert us or the primary physician if symptoms of infection or other concerning signs are noted. Adbry Counseling: I discussed with the patient the risks of tralokinumab including but not limited to eye infection and irritation, cold sores, injection site reactions, worsening of asthma, allergic reactions and increased risk of parasitic infection.  Live vaccines should be avoided while taking tralokinumab. The patient understands that monitoring is required and they must alert us or the primary physician if symptoms of infection or other concerning signs are noted. Rifampin Pregnancy And Lactation Text: This medication is Pregnancy Category C and it isn't know if it is safe during pregnancy. It is also excreted in breast milk and should not be used if you are breast feeding. Azelaic Acid Counseling: Patient counseled that medicine may cause skin irritation and to avoid applying near the eyes.  In the event of skin irritation, the patient was advised to reduce the amount of the drug applied or use it less frequently.   The patient verbalized understanding of the proper use and possible adverse effects of azelaic acid.  All of the patient's questions and concerns were addressed. Gabapentin Counseling: I discussed with the patient the risks of gabapentin including but not limited to dizziness, somnolence, fatigue and ataxia. Isotretinoin Pregnancy And Lactation Text: This medication is Pregnancy Category X and is considered extremely dangerous during pregnancy. It is unknown if it is excreted in breast milk. Niacinamide Pregnancy And Lactation Text: These medications are considered safe during pregnancy. Enbrel Counseling:  I discussed with the patient the risks of etanercept including but not limited to myelosuppression, immunosuppression, autoimmune hepatitis, demyelinating diseases, lymphoma, and infections.  The patient understands that monitoring is required including a PPD at baseline and must alert us or the primary physician if symptoms of infection or other concerning signs are noted. Itraconazole Counseling:  I discussed with the patient the risks of itraconazole including but not limited to liver damage, nausea/vomiting, neuropathy, and severe allergy.  The patient understands that this medication is best absorbed when taken with acidic beverages such as non-diet cola or ginger ale.  The patient understands that monitoring is required including baseline LFTs and repeat LFTs at intervals.  The patient understands that they are to contact us or the primary physician if concerning signs are noted. Opzelura Pregnancy And Lactation Text: There is insufficient data to evaluate drug-associated risk for major birth defects, miscarriage, or other adverse maternal or fetal outcomes.  There is a pregnancy registry that monitors pregnancy outcomes in pregnant persons exposed to the medication during pregnancy.  It is unknown if this medication is excreted in breast milk.  Do not breastfeed during treatment and for about 4 weeks after the last dose. Wartpeel Counseling:  I discussed with the patient the risks of Wartpeel including but not limited to erythema, scaling, itching, weeping, crusting, and pain. Bactrim Pregnancy And Lactation Text: This medication is Pregnancy Category D and is known to cause fetal risk.  It is also excreted in breast milk. Finasteride Female Counseling: Finasteride Counseling:  I discussed with the patient the risks of use of finasteride including but not limited to decreased libido and sexual dysfunction. Explained the teratogenic nature of the medication and stressed the importance of not getting pregnant during treatment. All of the patient's questions and concerns were addressed. Solaraze Counseling:  I discussed with the patient the risks of Solaraze including but not limited to erythema, scaling, itching, weeping, crusting, and pain. Topical Clindamycin Pregnancy And Lactation Text: This medication is Pregnancy Category B and is considered safe during pregnancy. It is unknown if it is excreted in breast milk. Valtrex Counseling: I discussed with the patient the risks of valacyclovir including but not limited to kidney damage, nausea, vomiting and severe allergy.  The patient understands that if the infection seems to be worsening or is not improving, they are to call. Doxepin Pregnancy And Lactation Text: This medication is Pregnancy Category C and it isn't known if it is safe during pregnancy. It is also excreted in breast milk and breast feeding isn't recommended. Sotyktu Pregnancy And Lactation Text: There is insufficient data to evaluate whether or not Sotyktu is safe to use during pregnancy.   It is not known if Sotyktu passes into breast milk and whether or not it is safe to use when breastfeeding.   Imiquimod Counseling:  I discussed with the patient the risks of imiquimod including but not limited to erythema, scaling, itching, weeping, crusting, and pain.  Patient understands that the inflammatory response to imiquimod is variable from person to person and was educated regarded proper titration schedule.  If flu-like symptoms develop, patient knows to discontinue the medication and contact us. Erythromycin Counseling:  I discussed with the patient the risks of erythromycin including but not limited to GI upset, allergic reaction, drug rash, diarrhea, increase in liver enzymes, and yeast infections. Adbry Pregnancy And Lactation Text: It is unknown if this medication will adversely affect pregnancy or breast feeding. High Dose Vitamin A Counseling: Side effects reviewed, pt to contact office should one occur. Nsaids Counseling: NSAID Counseling: I discussed with the patient that NSAIDs should be taken with food. Prolonged use of NSAIDs can result in the development of stomach ulcers.  Patient advised to stop taking NSAIDs if abdominal pain occurs.  The patient verbalized understanding of the proper use and possible adverse effects of NSAIDs.  All of the patient's questions and concerns were addressed. Cibinqo Counseling: I discussed with the patient the risks of Cibinqo therapy including but not limited to common cold, nausea, headache, cold sores, increased blood CPK levels, dizziness, UTIs, fatigue, acne, and vomitting. Live vaccines should be avoided.  This medication has been linked to serious infections; higher rate of mortality; malignancy and lymphoproliferative disorders; major adverse cardiovascular events; thrombosis; thrombocytopenia and lymphopenia; lipid elevations; and retinal detachment. Arava Counseling:  Patient counseled regarding adverse effects of Arava including but not limited to nausea, vomiting, abnormalities in liver function tests. Patients may develop mouth sores, rash, diarrhea, and abnormalities in blood counts. The patient understands that monitoring is required including LFTs and blood counts.  There is a rare possibility of scarring of the liver and lung problems that can occur when taking methotrexate. Persistent nausea, loss of appetite, pale stools, dark urine, cough, and shortness of breath should be reported immediately. Patient advised to discontinue Arava treatment and consult with a physician prior to attempting conception. The patient will have to undergo a treatment to eliminate Arava from the body prior to conception. Sarecycline Counseling: Patient advised regarding possible photosensitivity and discoloration of the teeth, skin, lips, tongue and gums.  Patient instructed to avoid sunlight, if possible.  When exposed to sunlight, patients should wear protective clothing, sunglasses, and sunscreen.  The patient was instructed to call the office immediately if the following severe adverse effects occur:  hearing changes, easy bruising/bleeding, severe headache, or vision changes.  The patient verbalized understanding of the proper use and possible adverse effects of sarecycline.  All of the patient's questions and concerns were addressed. Cephalexin Counseling: I counseled the patient regarding use of cephalexin as an antibiotic for prophylactic and/or therapeutic purposes. Cephalexin (commonly prescribed under brand name Keflex) is a cephalosporin antibiotic which is active against numerous classes of bacteria, including most skin bacteria. Side effects may include nausea, diarrhea, gastrointestinal upset, rash, hives, yeast infections, and in rare cases, hepatitis, kidney disease, seizures, fever, confusion, neurologic symptoms, and others. Patients with severe allergies to penicillin medications are cautioned that there is about a 10% incidence of cross-reactivity with cephalosporins. When possible, patients with penicillin allergies should use alternatives to cephalosporins for antibiotic therapy. Solaraze Pregnancy And Lactation Text: This medication is Pregnancy Category B and is considered safe. There is some data to suggest avoiding during the third trimester. It is unknown if this medication is excreted in breast milk. Libtayo Counseling- I discussed with the patient the risks of Libtayo including but not limited to nausea, vomiting, diarrhea, and bone or muscle pain.  The patient verbalized understanding of the proper use and possible adverse effects of Libtayo.  All of the patient's questions and concerns were addressed. Finasteride Pregnancy And Lactation Text: This medication is absolutely contraindicated during pregnancy. It is unknown if it is excreted in breast milk. Topical Ketoconazole Counseling: Patient counseled that this medication may cause skin irritation or allergic reactions.  In the event of skin irritation, the patient was advised to reduce the amount of the drug applied or use it less frequently.   The patient verbalized understanding of the proper use and possible adverse effects of ketoconazole.  All of the patient's questions and concerns were addressed. Xeljanz Counseling: I discussed with the patient the risks of Xeljanz therapy including increased risk of infection, liver issues, headache, diarrhea, or cold symptoms. Live vaccines should be avoided. They were instructed to call if they have any problems. Erythromycin Pregnancy And Lactation Text: This medication is Pregnancy Category B and is considered safe during pregnancy. It is also excreted in breast milk. Valtrex Pregnancy And Lactation Text: this medication is Pregnancy Category B and is considered safe during pregnancy. This medication is not directly found in breast milk but it's metabolite acyclovir is present. Bimzelx Counseling:  I discussed with the patient the risks of Bimzelx including but not limited to depression, immunosuppression, allergic reactions and infections.  The patient understands that monitoring is required including a PPD at baseline and must alert us or the primary physician if symptoms of infection or other concerning signs are noted. Rituxan Counseling:  I discussed with the patient the risks of Rituxan infusions. Side effects can include infusion reactions, severe drug rashes including mucocutaneous reactions, reactivation of latent hepatitis and other infections and rarely progressive multifocal leukoencephalopathy.  All of the patient's questions and concerns were addressed. Cyclophosphamide Counseling:  I discussed with the patient the risks of cyclophosphamide including but not limited to hair loss, hormonal abnormalities, decreased fertility, abdominal pain, diarrhea, nausea and vomiting, bone marrow suppression and infection. The patient understands that monitoring is required while taking this medication. Picato Counseling:  I discussed with the patient the risks of Picato including but not limited to erythema, scaling, itching, weeping, crusting, and pain. Nsaids Pregnancy And Lactation Text: These medications are considered safe up to 30 weeks gestation. It is excreted in breast milk. High Dose Vitamin A Pregnancy And Lactation Text: High dose vitamin A therapy is contraindicated during pregnancy and breast feeding. Cibinqo Pregnancy And Lactation Text: It is unknown if this medication will adversely affect pregnancy or breast feeding.  You should not take this medication if you are currently pregnant or planning a pregnancy or while breastfeeding. Hydroxyzine Counseling: Patient advised that the medication is sedating and not to drive a car after taking this medication.  Patient informed of potential adverse effects including but not limited to dry mouth, urinary retention, and blurry vision.  The patient verbalized understanding of the proper use and possible adverse effects of hydroxyzine.  All of the patient's questions and concerns were addressed. Rituxan Pregnancy And Lactation Text: This medication is Pregnancy Category C and it isn't know if it is safe during pregnancy. It is unknown if this medication is excreted in breast milk but similar antibodies are known to be excreted. Taltz Counseling: I discussed with the patient the risks of ixekizumab including but not limited to immunosuppression, serious infections, worsening of inflammatory bowel disease and drug reactions.  The patient understands that monitoring is required including a PPD at baseline and must alert us or the primary physician if symptoms of infection or other concerning signs are noted. Propranolol Counseling:  I discussed with the patient the risks of propranolol including but not limited to low heart rate, low blood pressure, low blood sugar, restlessness and increased cold sensitivity. They should call the office if they experience any of these side effects. Benzoyl Peroxide Counseling: Patient counseled that medicine may cause skin irritation and bleach clothing.  In the event of skin irritation, the patient was advised to reduce the amount of the drug applied or use it less frequently.   The patient verbalized understanding of the proper use and possible adverse effects of benzoyl peroxide.  All of the patient's questions and concerns were addressed. Libtayo Pregnancy And Lactation Text: This medication is contraindicated in pregnancy and when breast feeding. Drysol Counseling:  I discussed with the patient the risks of drysol/aluminum chloride including but not limited to skin rash, itching, irritation, burning. Winlevi Counseling:  I discussed with the patient the risks of topical clascoterone including but not limited to erythema, scaling, itching, and stinging. Patient voiced their understanding. Glycopyrrolate Counseling:  I discussed with the patient the risks of glycopyrrolate including but not limited to skin rash, drowsiness, dry mouth, difficulty urinating, and blurred vision. Bimzelx Pregnancy And Lactation Text: This medication crosses the placenta and the safety is uncertain during pregnancy. It is unknown if this medication is present in breast milk. Metronidazole Counseling:  I discussed with the patient the risks of metronidazole including but not limited to seizures, nausea/vomiting, a metallic taste in the mouth, nausea/vomiting and severe allergy. Birth Control Pills Counseling: Birth Control Pill Counseling: I discussed with the patient the potential side effects of OCPs including but not limited to increased risk of stroke, heart attack, thrombophlebitis, deep venous thrombosis, hepatic adenomas, breast changes, GI upset, headaches, and depression.  The patient verbalized understanding of the proper use and possible adverse effects of OCPs. All of the patient's questions and concerns were addressed. Soolantra Counseling: I discussed with the patients the risks of topial Soolantra. This is a medicine which decreases the number of mites and inflammation in the skin. You experience burning, stinging, eye irritation or allergic reactions.  Please call our office if you develop any problems from using this medication. Detail Level: Simple Cephalexin Pregnancy And Lactation Text: This medication is Pregnancy Category B and considered safe during pregnancy.  It is also excreted in breast milk but can be used safely for shorter doses. Humira Counseling:  I discussed with the patient the risks of adalimumab including but not limited to myelosuppression, immunosuppression, autoimmune hepatitis, demyelinating diseases, lymphoma, and serious infections.  The patient understands that monitoring is required including a PPD at baseline and must alert us or the primary physician if symptoms of infection or other concerning signs are noted. Cyclophosphamide Pregnancy And Lactation Text: This medication is Pregnancy Category D and it isn't considered safe during pregnancy. This medication is excreted in breast milk. Hydroxyzine Pregnancy And Lactation Text: This medication is not safe during pregnancy and should not be taken. It is also excreted in breast milk and breast feeding isn't recommended. Ketoconazole Counseling:   Patient counseled regarding improving absorption with orange juice.  Adverse effects include but are not limited to breast enlargement, headache, diarrhea, nausea, upset stomach, liver function test abnormalities, taste disturbance, and stomach pain.  There is a rare possibility of liver failure that can occur when taking ketoconazole. The patient understands that monitoring of LFTs may be required, especially at baseline. The patient verbalized understanding of the proper use and possible adverse effects of ketoconazole.  All of the patient's questions and concerns were addressed. Propranolol Pregnancy And Lactation Text: This medication is Pregnancy Category C and it isn't known if it is safe during pregnancy. It is excreted in breast milk. Xeladelsoz Pregnancy And Lactation Text: This medication is Pregnancy Category D and is not considered safe during pregnancy.  The risk during breast feeding is also uncertain.

## 2024-12-10 NOTE — ED ADULT NURSE NOTE - CAS TRG GENERAL [employer], MLM
Please call patient's  as he is the  for this patient.  Please let him know that with her Pap smear, she tested positive for HPV, which is a virus that can cause cervical cancer.  Because of these findings, I would recommend that she see an OB/GYN to discuss further testing.  Please let me know if he has any questions.   Patent

## 2024-12-30 ENCOUNTER — APPOINTMENT (OUTPATIENT)
Dept: INTERNAL MEDICINE | Facility: CLINIC | Age: 49
End: 2024-12-30
Payer: COMMERCIAL

## 2024-12-30 ENCOUNTER — OUTPATIENT (OUTPATIENT)
Dept: OUTPATIENT SERVICES | Facility: HOSPITAL | Age: 49
LOS: 1 days | End: 2024-12-30
Payer: COMMERCIAL

## 2024-12-30 VITALS
OXYGEN SATURATION: 98 % | HEIGHT: 59 IN | BODY MASS INDEX: 24.8 KG/M2 | WEIGHT: 123 LBS | HEART RATE: 88 BPM | SYSTOLIC BLOOD PRESSURE: 130 MMHG | DIASTOLIC BLOOD PRESSURE: 82 MMHG

## 2024-12-30 DIAGNOSIS — I10 ESSENTIAL (PRIMARY) HYPERTENSION: ICD-10-CM

## 2024-12-30 DIAGNOSIS — R05.3 CHRONIC COUGH: ICD-10-CM

## 2024-12-30 DIAGNOSIS — Z90.49 ACQUIRED ABSENCE OF OTHER SPECIFIED PARTS OF DIGESTIVE TRACT: Chronic | ICD-10-CM

## 2024-12-30 PROCEDURE — G0463: CPT

## 2024-12-30 PROCEDURE — 99213 OFFICE O/P EST LOW 20 MIN: CPT | Mod: GC

## 2024-12-31 RX ORDER — BENZONATATE 100 MG/1
100 CAPSULE ORAL EVERY 8 HOURS
Qty: 21 | Refills: 0 | Status: ACTIVE | COMMUNITY
Start: 2024-12-31 | End: 1900-01-01

## 2024-12-31 RX ORDER — BENZONATATE 150 MG/1
150 CAPSULE ORAL 3 TIMES DAILY
Qty: 21 | Refills: 0 | Status: DISCONTINUED | COMMUNITY
Start: 2024-12-30 | End: 2024-12-31

## 2025-01-13 DIAGNOSIS — R05.3 CHRONIC COUGH: ICD-10-CM

## 2025-03-17 ENCOUNTER — RX RENEWAL (OUTPATIENT)
Age: 50
End: 2025-03-17

## 2025-03-18 ENCOUNTER — APPOINTMENT (OUTPATIENT)
Dept: INTERNAL MEDICINE | Facility: CLINIC | Age: 50
End: 2025-03-18

## 2025-03-18 NOTE — PACU DISCHARGE NOTE - NSCLINEINSERTRD_GEN_ALL_CORE
Patient to follow up in 1 year with Dr. Umer Mejía MD [facility]     OK to STOP Clopidogrel (Plavix) moving forward.   Make sure to stay on ASA therapy.     Continue same medications and treatments.   Patient educated on proper medication use.   Patient educated on risk factor modification.   Please bring any lab results from other providers / physicians to your next appointment.     Please bring all medicines, vitamins, and herbal supplements with you when you come to the office.     Prescriptions will not be filled unless you are compliant with your follow up appointments or have a follow up appointment scheduled as per instruction of your physician. Refills should be requested at the time of your visit.    Stanley FRANCO RN am scribing for and in the presence of Dr. [facility]    No

## 2025-05-12 ENCOUNTER — NON-APPOINTMENT (OUTPATIENT)
Age: 50
End: 2025-05-12

## 2025-05-13 ENCOUNTER — APPOINTMENT (OUTPATIENT)
Dept: RADIOLOGY | Facility: CLINIC | Age: 50
End: 2025-05-13

## 2025-05-13 ENCOUNTER — APPOINTMENT (OUTPATIENT)
Dept: INTERNAL MEDICINE | Facility: CLINIC | Age: 50
End: 2025-05-13
Payer: COMMERCIAL

## 2025-05-13 ENCOUNTER — OUTPATIENT (OUTPATIENT)
Dept: OUTPATIENT SERVICES | Facility: HOSPITAL | Age: 50
LOS: 1 days | End: 2025-05-13
Payer: COMMERCIAL

## 2025-05-13 VITALS
HEART RATE: 86 BPM | OXYGEN SATURATION: 97 % | SYSTOLIC BLOOD PRESSURE: 102 MMHG | DIASTOLIC BLOOD PRESSURE: 68 MMHG | BODY MASS INDEX: 24.8 KG/M2 | WEIGHT: 123 LBS | HEIGHT: 59 IN

## 2025-05-13 DIAGNOSIS — M25.511 PAIN IN RIGHT SHOULDER: ICD-10-CM

## 2025-05-13 DIAGNOSIS — Z90.49 ACQUIRED ABSENCE OF OTHER SPECIFIED PARTS OF DIGESTIVE TRACT: Chronic | ICD-10-CM

## 2025-05-13 PROCEDURE — G2211 COMPLEX E/M VISIT ADD ON: CPT | Mod: NC

## 2025-05-13 PROCEDURE — 73030 X-RAY EXAM OF SHOULDER: CPT

## 2025-05-13 PROCEDURE — 73030 X-RAY EXAM OF SHOULDER: CPT | Mod: 26,RT

## 2025-05-13 PROCEDURE — G0463: CPT

## 2025-05-13 PROCEDURE — 99213 OFFICE O/P EST LOW 20 MIN: CPT

## 2025-05-14 ENCOUNTER — TRANSCRIPTION ENCOUNTER (OUTPATIENT)
Age: 50
End: 2025-05-14

## 2025-05-14 DIAGNOSIS — M54.2 CERVICALGIA: ICD-10-CM

## 2025-05-14 DIAGNOSIS — I10 ESSENTIAL (PRIMARY) HYPERTENSION: ICD-10-CM

## 2025-05-14 RX ORDER — IBUPROFEN 800 MG/1
800 TABLET, FILM COATED ORAL 3 TIMES DAILY
Qty: 90 | Refills: 2 | Status: ACTIVE | COMMUNITY
Start: 2025-05-14 | End: 1900-01-01

## 2025-05-15 ENCOUNTER — NON-APPOINTMENT (OUTPATIENT)
Age: 50
End: 2025-05-15

## 2025-05-15 ENCOUNTER — APPOINTMENT (OUTPATIENT)
Dept: ORTHOPEDIC SURGERY | Facility: CLINIC | Age: 50
End: 2025-05-15
Payer: COMMERCIAL

## 2025-05-15 VITALS — HEIGHT: 61 IN | WEIGHT: 125 LBS | BODY MASS INDEX: 23.6 KG/M2

## 2025-05-15 DIAGNOSIS — M75.81 OTHER SHOULDER LESIONS, RIGHT SHOULDER: ICD-10-CM

## 2025-05-15 DIAGNOSIS — M25.511 PAIN IN RIGHT SHOULDER: ICD-10-CM

## 2025-05-15 PROCEDURE — 99203 OFFICE O/P NEW LOW 30 MIN: CPT

## 2025-05-15 RX ORDER — METHYLPREDNISOLONE 4 MG/1
4 TABLET ORAL
Qty: 1 | Refills: 0 | Status: ACTIVE | COMMUNITY
Start: 2025-05-15 | End: 1900-01-01

## 2025-05-19 RX ORDER — CYCLOBENZAPRINE HYDROCHLORIDE 5 MG/1
5 TABLET, FILM COATED ORAL
Qty: 5 | Refills: 0 | Status: ACTIVE | COMMUNITY
Start: 2025-05-13

## 2025-05-19 RX ORDER — MELOXICAM 7.5 MG/1
7.5 TABLET ORAL
Qty: 14 | Refills: 0 | Status: ACTIVE | COMMUNITY
Start: 2025-05-13

## 2025-06-25 ENCOUNTER — APPOINTMENT (OUTPATIENT)
Dept: INTERNAL MEDICINE | Facility: CLINIC | Age: 50
End: 2025-06-25

## 2025-07-02 ENCOUNTER — APPOINTMENT (OUTPATIENT)
Dept: OBGYN | Facility: CLINIC | Age: 50
End: 2025-07-02
Payer: COMMERCIAL

## 2025-07-02 ENCOUNTER — OUTPATIENT (OUTPATIENT)
Dept: OUTPATIENT SERVICES | Facility: HOSPITAL | Age: 50
LOS: 1 days | End: 2025-07-02
Payer: COMMERCIAL

## 2025-07-02 ENCOUNTER — APPOINTMENT (OUTPATIENT)
Dept: ULTRASOUND IMAGING | Facility: CLINIC | Age: 50
End: 2025-07-02
Payer: COMMERCIAL

## 2025-07-02 ENCOUNTER — LABORATORY RESULT (OUTPATIENT)
Age: 50
End: 2025-07-02

## 2025-07-02 ENCOUNTER — RESULT REVIEW (OUTPATIENT)
Age: 50
End: 2025-07-02

## 2025-07-02 VITALS — DIASTOLIC BLOOD PRESSURE: 72 MMHG | BODY MASS INDEX: 23.43 KG/M2 | WEIGHT: 124 LBS | SYSTOLIC BLOOD PRESSURE: 108 MMHG

## 2025-07-02 DIAGNOSIS — N76.0 ACUTE VAGINITIS: ICD-10-CM

## 2025-07-02 DIAGNOSIS — M54.50 LOW BACK PAIN, UNSPECIFIED: ICD-10-CM

## 2025-07-02 PROBLEM — R30.0 DYSURIA: Status: ACTIVE | Noted: 2025-07-02

## 2025-07-02 PROBLEM — R39.9 UTI SYMPTOMS: Status: ACTIVE | Noted: 2022-06-22

## 2025-07-02 LAB
BILIRUB UR QL STRIP: NORMAL
CLARITY UR: CLEAR
COLLECTION METHOD: NORMAL
GLUCOSE UR-MCNC: NORMAL
HCG UR QL: 0.2 EU/DL
HGB UR QL STRIP.AUTO: NORMAL
KETONES UR-MCNC: NORMAL
LEUKOCYTE ESTERASE UR QL STRIP: NORMAL
NITRITE UR QL STRIP: NORMAL
PH UR STRIP: 6.5
PROT UR STRIP-MCNC: NORMAL
SP GR UR STRIP: 1.01

## 2025-07-02 PROCEDURE — 87481 CANDIDA DNA AMP PROBE: CPT

## 2025-07-02 PROCEDURE — 81513 NFCT DS BV RNA VAG FLU ALG: CPT

## 2025-07-02 PROCEDURE — 76770 US EXAM ABDO BACK WALL COMP: CPT

## 2025-07-02 PROCEDURE — 81003 URINALYSIS AUTO W/O SCOPE: CPT

## 2025-07-02 PROCEDURE — 99213 OFFICE O/P EST LOW 20 MIN: CPT | Mod: 25

## 2025-07-02 PROCEDURE — 76770 US EXAM ABDO BACK WALL COMP: CPT | Mod: 26

## 2025-07-02 PROCEDURE — G0463: CPT

## 2025-07-02 PROCEDURE — 87491 CHLMYD TRACH DNA AMP PROBE: CPT

## 2025-07-02 PROCEDURE — 87086 URINE CULTURE/COLONY COUNT: CPT

## 2025-07-02 PROCEDURE — 87591 N.GONORRHOEAE DNA AMP PROB: CPT

## 2025-07-02 PROCEDURE — 87661 TRICHOMONAS VAGINALIS AMPLIF: CPT

## 2025-07-03 ENCOUNTER — LABORATORY RESULT (OUTPATIENT)
Age: 50
End: 2025-07-03

## 2025-07-03 ENCOUNTER — OUTPATIENT (OUTPATIENT)
Dept: OUTPATIENT SERVICES | Facility: HOSPITAL | Age: 50
LOS: 1 days | End: 2025-07-03

## 2025-07-03 ENCOUNTER — APPOINTMENT (OUTPATIENT)
Dept: INTERNAL MEDICINE | Facility: CLINIC | Age: 50
End: 2025-07-03
Payer: COMMERCIAL

## 2025-07-03 VITALS
HEIGHT: 61 IN | DIASTOLIC BLOOD PRESSURE: 76 MMHG | OXYGEN SATURATION: 98 % | HEART RATE: 76 BPM | WEIGHT: 122 LBS | BODY MASS INDEX: 23.03 KG/M2 | SYSTOLIC BLOOD PRESSURE: 110 MMHG

## 2025-07-03 DIAGNOSIS — I10 ESSENTIAL (PRIMARY) HYPERTENSION: ICD-10-CM

## 2025-07-03 PROBLEM — M54.50 LOWER BACK PAIN: Status: ACTIVE | Noted: 2025-07-02

## 2025-07-03 PROCEDURE — G0463: CPT

## 2025-07-03 PROCEDURE — 85025 COMPLETE CBC W/AUTO DIFF WBC: CPT

## 2025-07-03 PROCEDURE — 83036 HEMOGLOBIN GLYCOSYLATED A1C: CPT

## 2025-07-03 PROCEDURE — 80053 COMPREHEN METABOLIC PANEL: CPT

## 2025-07-03 PROCEDURE — 99213 OFFICE O/P EST LOW 20 MIN: CPT | Mod: GE

## 2025-07-03 PROCEDURE — 80061 LIPID PANEL: CPT

## 2025-07-03 PROCEDURE — 84443 ASSAY THYROID STIM HORMONE: CPT

## 2025-07-03 RX ORDER — RIZATRIPTAN BENZOATE 10 MG/1
10 TABLET ORAL
Refills: 0 | Status: ACTIVE | COMMUNITY

## 2025-07-03 RX ORDER — MECOBALAMIN 1000 MCG
1 TABLET,CHEWABLE ORAL
Refills: 0 | Status: ACTIVE | COMMUNITY

## 2025-07-04 LAB
BV BACTERIA RRNA VAG QL NAA+PROBE: SIGNIFICANT CHANGE UP
C GLABRATA RNA VAG QL NAA+PROBE: DETECTED
C TRACH RRNA SPEC QL NAA+PROBE: SIGNIFICANT CHANGE UP
CANDIDA RRNA VAG QL PROBE: DETECTED
CULTURE RESULTS: SIGNIFICANT CHANGE UP
N GONORRHOEA RRNA SPEC QL NAA+PROBE: SIGNIFICANT CHANGE UP
SPECIMEN SOURCE: SIGNIFICANT CHANGE UP
T VAGINALIS RRNA SPEC QL NAA+PROBE: SIGNIFICANT CHANGE UP

## 2025-07-06 PROBLEM — B37.31 YEAST VAGINITIS: Status: ACTIVE | Noted: 2025-07-06 | Resolved: 2025-08-05

## 2025-07-06 PROBLEM — Z87.442 HISTORY OF RENAL CALCULI: Status: ACTIVE | Noted: 2025-07-06

## 2025-07-06 RX ORDER — TERCONAZOLE 4 MG/G
0.4 CREAM VAGINAL
Qty: 1 | Refills: 0 | Status: ACTIVE | COMMUNITY
Start: 2025-07-06 | End: 1900-01-01

## 2025-07-08 LAB
ALBUMIN SERPL ELPH-MCNC: 4.1 G/DL
ALP BLD-CCNC: 87 U/L
ALT SERPL-CCNC: 15 U/L
ANION GAP SERPL CALC-SCNC: 11 MMOL/L
AST SERPL-CCNC: 16 U/L
BASOPHILS # BLD AUTO: 0.03 K/UL
BASOPHILS NFR BLD AUTO: 0.4 %
BILIRUB SERPL-MCNC: 0.4 MG/DL
BUN SERPL-MCNC: 10 MG/DL
CALCIUM SERPL-MCNC: 9.6 MG/DL
CHLORIDE SERPL-SCNC: 103 MMOL/L
CHOLEST SERPL-MCNC: 202 MG/DL
CO2 SERPL-SCNC: 24 MMOL/L
CREAT SERPL-MCNC: 0.96 MG/DL
EGFRCR SERPLBLD CKD-EPI 2021: 73 ML/MIN/1.73M2
EOSINOPHIL # BLD AUTO: 0.17 K/UL
EOSINOPHIL NFR BLD AUTO: 2 %
ESTIMATED AVERAGE GLUCOSE: 108 MG/DL
GLUCOSE SERPL-MCNC: 88 MG/DL
HBA1C MFR BLD HPLC: 5.4 %
HCT VFR BLD CALC: 41.6 %
HDLC SERPL-MCNC: 52 MG/DL
HGB BLD-MCNC: 13 G/DL
IMM GRANULOCYTES NFR BLD AUTO: 0.4 %
LDLC SERPL-MCNC: 137 MG/DL
LYMPHOCYTES # BLD AUTO: 2.24 K/UL
LYMPHOCYTES NFR BLD AUTO: 26.8 %
MAN DIFF?: NORMAL
MCHC RBC-ENTMCNC: 26.9 PG
MCHC RBC-ENTMCNC: 31.3 G/DL
MCV RBC AUTO: 86 FL
MONOCYTES # BLD AUTO: 0.49 K/UL
MONOCYTES NFR BLD AUTO: 5.9 %
NEUTROPHILS # BLD AUTO: 5.41 K/UL
NEUTROPHILS NFR BLD AUTO: 64.5 %
NONHDLC SERPL-MCNC: 149 MG/DL
PLATELET # BLD AUTO: 216 K/UL
POTASSIUM SERPL-SCNC: 3.7 MMOL/L
PROT SERPL-MCNC: 7.4 G/DL
RBC # BLD: 4.84 M/UL
RBC # FLD: 13 %
SODIUM SERPL-SCNC: 137 MMOL/L
TRIGL SERPL-MCNC: 69 MG/DL
TSH SERPL-ACNC: 1.01 UIU/ML
WBC # FLD AUTO: 8.37 K/UL

## 2025-07-14 DIAGNOSIS — Z11.3 ENCOUNTER FOR SCREENING FOR INFECTIONS WITH A PREDOMINANTLY SEXUAL MODE OF TRANSMISSION: ICD-10-CM

## 2025-07-14 DIAGNOSIS — M54.50 LOW BACK PAIN, UNSPECIFIED: ICD-10-CM

## 2025-07-14 DIAGNOSIS — R30.0 DYSURIA: ICD-10-CM

## 2025-07-14 DIAGNOSIS — N89.8 OTHER SPECIFIED NONINFLAMMATORY DISORDERS OF VAGINA: ICD-10-CM

## 2025-07-21 ENCOUNTER — APPOINTMENT (OUTPATIENT)
Dept: INTERNAL MEDICINE | Facility: CLINIC | Age: 50
End: 2025-07-21

## 2025-07-24 ENCOUNTER — RESULT REVIEW (OUTPATIENT)
Age: 50
End: 2025-07-24

## 2025-07-24 ENCOUNTER — OUTPATIENT (OUTPATIENT)
Dept: OUTPATIENT SERVICES | Facility: HOSPITAL | Age: 50
LOS: 1 days | End: 2025-07-24
Payer: COMMERCIAL

## 2025-07-24 ENCOUNTER — APPOINTMENT (OUTPATIENT)
Dept: MAMMOGRAPHY | Facility: IMAGING CENTER | Age: 50
End: 2025-07-24
Payer: COMMERCIAL

## 2025-07-24 DIAGNOSIS — Z00.00 ENCOUNTER FOR GENERAL ADULT MEDICAL EXAMINATION WITHOUT ABNORMAL FINDINGS: ICD-10-CM

## 2025-07-24 PROCEDURE — 77063 BREAST TOMOSYNTHESIS BI: CPT

## 2025-07-24 PROCEDURE — 77067 SCR MAMMO BI INCL CAD: CPT

## 2025-07-24 PROCEDURE — 77067 SCR MAMMO BI INCL CAD: CPT | Mod: 26

## 2025-07-24 PROCEDURE — 77063 BREAST TOMOSYNTHESIS BI: CPT | Mod: 26

## 2025-07-25 ENCOUNTER — APPOINTMENT (OUTPATIENT)
Dept: ULTRASOUND IMAGING | Facility: CLINIC | Age: 50
End: 2025-07-25

## 2025-07-25 ENCOUNTER — RESULT REVIEW (OUTPATIENT)
Age: 50
End: 2025-07-25

## 2025-07-25 ENCOUNTER — OUTPATIENT (OUTPATIENT)
Dept: OUTPATIENT SERVICES | Facility: HOSPITAL | Age: 50
LOS: 1 days | End: 2025-07-25
Payer: COMMERCIAL

## 2025-07-25 DIAGNOSIS — Z90.49 ACQUIRED ABSENCE OF OTHER SPECIFIED PARTS OF DIGESTIVE TRACT: Chronic | ICD-10-CM

## 2025-07-25 DIAGNOSIS — Z00.8 ENCOUNTER FOR OTHER GENERAL EXAMINATION: ICD-10-CM

## 2025-07-25 PROCEDURE — 76641 ULTRASOUND BREAST COMPLETE: CPT | Mod: 26,50

## 2025-07-25 PROCEDURE — 76641 ULTRASOUND BREAST COMPLETE: CPT

## 2025-07-28 ENCOUNTER — APPOINTMENT (OUTPATIENT)
Dept: INTERNAL MEDICINE | Facility: CLINIC | Age: 50
End: 2025-07-28
Payer: COMMERCIAL

## 2025-07-28 DIAGNOSIS — M54.2 CERVICALGIA: ICD-10-CM

## 2025-07-28 PROCEDURE — G2211 COMPLEX E/M VISIT ADD ON: CPT | Mod: NC,95

## 2025-07-28 PROCEDURE — 99213 OFFICE O/P EST LOW 20 MIN: CPT | Mod: 95

## 2025-07-30 NOTE — ED ADULT TRIAGE NOTE - BIRTH SEX
[No Acute Distress] : no acute distress [Well-Appearing] : well-appearing [Normal Sclera/Conjunctiva] : normal sclera/conjunctiva [No Respiratory Distress] : no respiratory distress  [Clear to Auscultation] : lungs were clear to auscultation bilaterally [Normal Rate] : normal rate  [Regular Rhythm] : with a regular rhythm [Normal S1, S2] : normal S1 and S2 [No Murmur] : no murmur heard [No Edema] : there was no peripheral edema [No Joint Swelling] : no joint swelling [No Rash] : no rash Female

## 2025-09-17 ENCOUNTER — APPOINTMENT (OUTPATIENT)
Dept: INTERNAL MEDICINE | Facility: CLINIC | Age: 50
End: 2025-09-17
Payer: COMMERCIAL

## 2025-09-17 DIAGNOSIS — M54.9 DORSALGIA, UNSPECIFIED: ICD-10-CM

## 2025-09-17 PROCEDURE — 99212 OFFICE O/P EST SF 10 MIN: CPT | Mod: 93

## 2025-09-17 PROCEDURE — G2211 COMPLEX E/M VISIT ADD ON: CPT | Mod: NC,93

## 2025-09-18 DIAGNOSIS — Z11.59 ENCOUNTER FOR SCREENING FOR OTHER VIRAL DISEASES: ICD-10-CM

## 2025-09-18 DIAGNOSIS — Z11.1 ENCOUNTER FOR SCREENING FOR RESPIRATORY TUBERCULOSIS: ICD-10-CM

## 2025-09-19 ENCOUNTER — APPOINTMENT (OUTPATIENT)
Dept: INTERNAL MEDICINE | Facility: CLINIC | Age: 50
End: 2025-09-19

## 2025-09-19 LAB
MEV IGG FLD QL IA: >300 AU/ML
MEV IGG+IGM SER-IMP: POSITIVE
MUV AB SER-ACNC: POSITIVE
MUV IGG SER QL IA: 99.6 AU/ML
RUBV IGG FLD-ACNC: 9.45 INDEX
RUBV IGG SER-IMP: POSITIVE
VZV AB TITR SER: POSITIVE
VZV IGG SER IF-ACNC: 10.7 S/CO

## 2025-09-22 LAB
M TB IFN-G BLD-IMP: NEGATIVE
QUANTIFERON TB PLUS MITOGEN MINUS NIL: >10 IU/ML
QUANTIFERON TB PLUS NIL: 0.04 IU/ML
QUANTIFERON TB PLUS TB1 MINUS NIL: 0 IU/ML
QUANTIFERON TB PLUS TB2 MINUS NIL: 0 IU/ML

## (undated) DEVICE — SUT BIOSYN 4-0 18" P-12

## (undated) DEVICE — TUBING INSUFFLATION LAP FILTER 10FT

## (undated) DEVICE — SYR LUER LOK 20CC

## (undated) DEVICE — INSUFFLATION NDL COVIDIEN STEP 14G FOR STEP/VERSASTEP

## (undated) DEVICE — SUT POLYSORB 0 36" GU-46

## (undated) DEVICE — PREP CHLORAPREP HI-LITE ORANGE 26ML

## (undated) DEVICE — TROCAR APPLIED MEDICAL KII BALLOON BLUNT TIP 12MM X 130MM

## (undated) DEVICE — DISSECTOR ENDO PEANUT 5MM

## (undated) DEVICE — WARMING BLANKET UPPER ADULT

## (undated) DEVICE — SHEARS COVIDIEN ENDO SHEAR 5MM X 31CM W UNIPOLAR CAUTERY

## (undated) DEVICE — SPECIMEN CONTAINER 100ML

## (undated) DEVICE — DRAPE C ARM UNIVERSAL

## (undated) DEVICE — ELCTR CORD FOOTSWITCH 1PLR LAPSCP 10FT

## (undated) DEVICE — GOWN TRIMAX LG

## (undated) DEVICE — SOL IRR POUR H2O 250ML

## (undated) DEVICE — MEDICATION LABELS W MARKER

## (undated) DEVICE — DRSG STERISTRIPS 0.5 X 4"

## (undated) DEVICE — APPLICATOR VISTASEAL LAP DUAL 35CM RIGID

## (undated) DEVICE — STOPCOCK 3 WAY W TUBE 35"

## (undated) DEVICE — GLV 7.5 PROTEXIS (WHITE)

## (undated) DEVICE — SOL IRR POUR NS 0.9% 500ML

## (undated) DEVICE — DRSG OPSITE 2.5 X 2"

## (undated) DEVICE — TUBING TRUWAVE PRESSURE MALE/FEMALE 72"

## (undated) DEVICE — D HELP - CLEARVIEW CLEARIFY SYSTEM

## (undated) DEVICE — ENDOCATCH 10MM SPECIMEN POUCH

## (undated) DEVICE — SYR LUER LOK 30CC

## (undated) DEVICE — DRAPE TOWEL BLUE 17" X 24"

## (undated) DEVICE — VENODYNE/SCD SLEEVE CALF LARGE

## (undated) DEVICE — TROCAR COVIDIEN VERSASTEP PLUS 12MM STANDARD

## (undated) DEVICE — TROCAR COVIDIEN VERSAPORT BLADELESS OPTICAL 5MM STANDARD

## (undated) DEVICE — TUBING IRRIGATION DAVOL SYSTEM X STREAM

## (undated) DEVICE — SUT PDS II 0 18" ENDOLOOP LIGATURE

## (undated) DEVICE — STAPLER COVIDIEN ENDO GIA STANDARD HANDLE

## (undated) DEVICE — PACK ADVANCED LAPAROSCOPIC NS

## (undated) DEVICE — POSITIONER FOAM EGG CRATE ULNAR 2PCS (PINK)